# Patient Record
Sex: MALE | Race: WHITE | NOT HISPANIC OR LATINO | Employment: OTHER | URBAN - METROPOLITAN AREA
[De-identification: names, ages, dates, MRNs, and addresses within clinical notes are randomized per-mention and may not be internally consistent; named-entity substitution may affect disease eponyms.]

---

## 2017-01-06 ENCOUNTER — LAB VISIT (OUTPATIENT)
Dept: LAB | Facility: HOSPITAL | Age: 77
End: 2017-01-06
Attending: INTERNAL MEDICINE
Payer: MEDICARE

## 2017-01-06 ENCOUNTER — OFFICE VISIT (OUTPATIENT)
Dept: HEMATOLOGY/ONCOLOGY | Facility: CLINIC | Age: 77
End: 2017-01-06
Payer: MEDICARE

## 2017-01-06 VITALS
DIASTOLIC BLOOD PRESSURE: 67 MMHG | WEIGHT: 180.56 LBS | HEIGHT: 70 IN | OXYGEN SATURATION: 97 % | RESPIRATION RATE: 20 BRPM | SYSTOLIC BLOOD PRESSURE: 146 MMHG | BODY MASS INDEX: 25.85 KG/M2 | HEART RATE: 64 BPM | TEMPERATURE: 98 F

## 2017-01-06 DIAGNOSIS — C90.00 MULTIPLE MYELOMA, REMISSION STATUS UNSPECIFIED: ICD-10-CM

## 2017-01-06 DIAGNOSIS — C90.00 MULTIPLE MYELOMA, REMISSION STATUS UNSPECIFIED: Primary | ICD-10-CM

## 2017-01-06 DIAGNOSIS — Z76.82 STEM CELL TRANSPLANT CANDIDATE: ICD-10-CM

## 2017-01-06 LAB
ALBUMIN SERPL BCP-MCNC: 3.3 G/DL
ALP SERPL-CCNC: 51 U/L
ALT SERPL W/O P-5'-P-CCNC: 18 U/L
ANION GAP SERPL CALC-SCNC: 6 MMOL/L
AST SERPL-CCNC: 14 U/L
BASOPHILS # BLD AUTO: 0.02 K/UL
BASOPHILS NFR BLD: 0.6 %
BILIRUB SERPL-MCNC: 0.5 MG/DL
BUN SERPL-MCNC: 16 MG/DL
CALCIUM SERPL-MCNC: 8.5 MG/DL
CHLORIDE SERPL-SCNC: 106 MMOL/L
CO2 SERPL-SCNC: 26 MMOL/L
CREAT SERPL-MCNC: 1.1 MG/DL
DIFFERENTIAL METHOD: ABNORMAL
EOSINOPHIL # BLD AUTO: 0.1 K/UL
EOSINOPHIL NFR BLD: 3.3 %
ERYTHROCYTE [DISTWIDTH] IN BLOOD BY AUTOMATED COUNT: 13.5 %
EST. GFR  (AFRICAN AMERICAN): >60 ML/MIN/1.73 M^2
EST. GFR  (NON AFRICAN AMERICAN): >60 ML/MIN/1.73 M^2
GLUCOSE SERPL-MCNC: 108 MG/DL
HCT VFR BLD AUTO: 39 %
HGB BLD-MCNC: 12.7 G/DL
IGA SERPL-MCNC: 83 MG/DL
IGG SERPL-MCNC: 457 MG/DL
IGM SERPL-MCNC: 25 MG/DL
LYMPHOCYTES # BLD AUTO: 1 K/UL
LYMPHOCYTES NFR BLD: 28.3 %
MCH RBC QN AUTO: 30.5 PG
MCHC RBC AUTO-ENTMCNC: 32.6 %
MCV RBC AUTO: 94 FL
MONOCYTES # BLD AUTO: 0.7 K/UL
MONOCYTES NFR BLD: 18.1 %
NEUTROPHILS # BLD AUTO: 1.8 K/UL
NEUTROPHILS NFR BLD: 49.7 %
PLATELET # BLD AUTO: 71 K/UL
PMV BLD AUTO: 13 FL
POTASSIUM SERPL-SCNC: 4.5 MMOL/L
PROT SERPL-MCNC: 5.7 G/DL
RBC # BLD AUTO: 4.17 M/UL
SODIUM SERPL-SCNC: 138 MMOL/L
WBC # BLD AUTO: 3.6 K/UL

## 2017-01-06 PROCEDURE — 99214 OFFICE O/P EST MOD 30 MIN: CPT | Mod: S$PBB,,, | Performed by: INTERNAL MEDICINE

## 2017-01-06 PROCEDURE — 99999 PR PBB SHADOW E&M-EST. PATIENT-LVL III: CPT | Mod: PBBFAC,,, | Performed by: INTERNAL MEDICINE

## 2017-01-06 PROCEDURE — 99213 OFFICE O/P EST LOW 20 MIN: CPT | Mod: PBBFAC | Performed by: INTERNAL MEDICINE

## 2017-01-06 PROCEDURE — 86334 IMMUNOFIX E-PHORESIS SERUM: CPT | Mod: 26,,, | Performed by: PATHOLOGY

## 2017-01-06 PROCEDURE — 84165 PROTEIN E-PHORESIS SERUM: CPT | Mod: 26,,, | Performed by: PATHOLOGY

## 2017-01-06 RX ORDER — PREDNISOLONE ACETATE 10 MG/ML
SUSPENSION/ DROPS OPHTHALMIC
COMMUNITY
Start: 2016-12-12 | End: 2017-05-22

## 2017-01-06 RX ORDER — LATANOPROST 50 UG/ML
SOLUTION/ DROPS OPHTHALMIC
COMMUNITY
Start: 2016-12-07

## 2017-01-06 RX ORDER — LENALIDOMIDE 10 MG/1
CAPSULE ORAL
COMMUNITY
Start: 2016-12-22 | End: 2017-07-26 | Stop reason: SDUPTHER

## 2017-01-06 RX ORDER — CYCLOSPORINE 0.5 MG/ML
EMULSION OPHTHALMIC
COMMUNITY
Start: 2016-12-12

## 2017-01-06 NOTE — PROGRESS NOTES
SECTION OF HEMATOLOGY AND BONE MARROW TRANSPLANT  New Patient Visit   01/06/2017  Referred by:  No ref. provider found  Referred for:  MM, autoSCT    CHIEF COMPLAINT:   No chief complaint on file.      HISTORY OF PRESENT ILLNESS:   75 yo male with pmh as below, all well controlled on current medications; recent diagnosis of systemic myeloma, cytogenetic studies normal.  Presenting for cataract surgery.  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  Patient subsequently started XRT (has completed 3 of 23 planned fractions) with rad onc in [place].   He has also started weekly decadron (dose 3 today).  Both have resulted in improvement in proptosis and vision.  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  PET scan showed known orbit lesion as well as multiple other sites of  Bone disease.    Of note remote history of localized prostate ca s/p brachytherapy.  States PSA monitored frequently and he has no evidence of relapse.  Of note at time of prostate ca diagnosis approximately 10 years ago had bone marrow biopsy for low plt that was unremarkable.    PAST MEDICAL HISTORY:   Past Medical History   Diagnosis Date    Allergy     Anxiety     Cataract     Glaucoma     Hypertension     Prostate cancer     Thyroid disease        PAST SURGICAL HISTORY:   Past Surgical History   Procedure Laterality Date    Carpal tunnel release      Shoulder surgery      Brain surgery  2010    Broken leg Right        PAST SOCIAL HISTORY:   reports that he has quit smoking. He does not have any smokeless tobacco history on file. He reports that he drinks about 4.2 oz of alcohol per week  He reports that he does not use illicit drugs.    FAMILY HISTORY:  Family History   Problem Relation Age of Onset    Cancer Mother     Prostate cancer Father     Coronary artery disease Father      Dementia Sister     Stroke Brother     Dementia Brother     No Known Problems Brother     Dementia Brother     No Known Problems Brother     Pancreatitis Brother     Cancer Brother     No Known Problems Sister     No Known Problems Sister        CURRENT MEDICATIONS:   Current Outpatient Prescriptions   Medication Sig    alfuzosin (UROXATRAL) 10 mg Tb24     alprazolam (XANAX) 0.5 MG tablet     carvedilol (COREG) 6.25 MG tablet     ciprofloxacin HCl (CILOXAN) 0.3 % ophthalmic solution     dexamethasone (DECADRON) 4 MG Tab     DUREZOL 0.05 % Drop ophthalmic solution     fluorometholone 0.1% (FML) 0.1 % DrpS     hydrocortisone 1 % cream     ILEVRO 0.3 % DrpS     ketoconazole (NIZORAL) 2 % shampoo     latanoprost 0.005 % ophthalmic solution     levothyroxine (SYNTHROID) 125 MCG tablet     naproxen (NAPROSYN) 500 MG tablet     neomycin-polymyxin-hydrocortisone (CORTISPORIN) otic solution     omeprazole (PRILOSEC) 40 MG capsule     prednisoLONE acetate (PRED FORTE) 1 % DrpS     RESTASIS 0.05 % ophthalmic emulsion     REVLIMID 25 mg Cap     rosuvastatin (CRESTOR) 20 MG tablet Take 20 mg by mouth.    timolol maleate 0.5% (TIMOPTIC) 0.5 % Drop      No current facility-administered medications for this visit.      ALLERGIES:   Review of patient's allergies indicates:   Allergen Reactions    Decongest tabs      All over the counter medications containing decongestive.              REVIEW OF SYSTEMS:   General ROS: negative  Psychological ROS: negative  Ophthalmic ROS: see HPI  ENT ROS: negative  Allergy and Immunology ROS: negative  Hematological and Lymphatic ROS: negative  Endocrine ROS: negative  Respiratory ROS: negative  Cardiovascular ROS: negative  Gastrointestinal ROS: negative  Genito-Urinary ROS: negative  Musculoskeletal ROS: negative  Neurological ROS: negative  Dermatological ROS: negative    PHYSICAL EXAM:   Vitals:    01/06/17 1435   BP: (!) 146/67   Pulse: 64   Resp: 20   Temp: 97.7  °F (36.5 °C)       General - well developed, well nourished, no apparent distress  Head & Face - no sinus tenderness  Eyes - normal conjunctivae and lids   ENT - normal external auditory canals and tympanic membranes bilaterally oropharynx clear,  Normal dentition and gums  Neck - normal thyroid  Chest and Lung - normal respiratory effort, clear to auscultation bilaterally   Cardiovascular - RRR with no MGR, normal S1 and S2; no pedal edema  Abdomen -  soft, nontender, no palpable hepatomegaly or splenomegaly  Lymph - no palpable lymphadenopathy  Extremities - unremarkable nails and digits  Heme - no bruising, petechiae, pallor  Skin - no rashes or lesions  Psych - appropriate mood and affect      ECOG Performance Status: (foot note - ECOG PS provided by Eastern Cooperative Oncology Group) 1 - Symptomatic but completely ambulatory    Karnofsky Performance Score:  90%- Able to Carry on Normal Activity: Minor Symptoms of Disease  DATA: .  Lab Results   Component Value Date    WBC 3.60 (L) 01/06/2017    HGB 12.7 (L) 01/06/2017    HCT 39.0 (L) 01/06/2017    MCV 94 01/06/2017    PLT 71 (L) 01/06/2017     Gran #   Date Value Ref Range Status   10/13/2016 3.6 1.8 - 7.7 K/uL Final     Gran%   Date Value Ref Range Status   10/13/2016 86.3 (H) 38.0 - 73.0 % Final     Lymph #   Date Value Ref Range Status   10/13/2016 0.5 (L) 1.0 - 4.8 K/uL Final     Lymph%   Date Value Ref Range Status   10/13/2016 11.5 (L) 18.0 - 48.0 % Final     CMP  Sodium   Date Value Ref Range Status   10/13/2016 137 136 - 145 mmol/L Final     Potassium   Date Value Ref Range Status   10/13/2016 4.4 3.5 - 5.1 mmol/L Final     Chloride   Date Value Ref Range Status   10/13/2016 103 95 - 110 mmol/L Final     CO2   Date Value Ref Range Status   10/13/2016 25 23 - 29 mmol/L Final     Glucose   Date Value Ref Range Status   10/13/2016 160 (H) 70 - 110 mg/dL Final     BUN, Bld   Date Value Ref Range Status   10/13/2016 19 8 - 23 mg/dL Final     Creatinine    Date Value Ref Range Status   10/13/2016 1.0 0.5 - 1.4 mg/dL Final     Calcium   Date Value Ref Range Status   10/13/2016 9.7 8.7 - 10.5 mg/dL Final     Total Protein   Date Value Ref Range Status   10/13/2016 6.8 6.0 - 8.4 g/dL Final     Albumin   Date Value Ref Range Status   10/13/2016 4.0 3.5 - 5.2 g/dL Final     Total Bilirubin   Date Value Ref Range Status   10/13/2016 0.6 0.1 - 1.0 mg/dL Final     Comment:     For infants and newborns, interpretation of results should be based  on gestational age, weight and in agreement with clinical  observations.  Premature Infant recommended reference ranges:  Up to 24 hours.............<8.0 mg/dL  Up to 48 hours............<12.0 mg/dL  3-5 days..................<15.0 mg/dL  6-29 days.................<15.0 mg/dL       Alkaline Phosphatase   Date Value Ref Range Status   10/13/2016 71 55 - 135 U/L Final     AST   Date Value Ref Range Status   10/13/2016 15 10 - 40 U/L Final     ALT   Date Value Ref Range Status   10/13/2016 16 10 - 44 U/L Final     Anion Gap   Date Value Ref Range Status   10/13/2016 9 8 - 16 mmol/L Final     eGFR if    Date Value Ref Range Status   10/13/2016 >60.0 >60 mL/min/1.73 m^2 Final     eGFR if non    Date Value Ref Range Status   10/13/2016 >60.0 >60 mL/min/1.73 m^2 Final     Comment:     Calculation used to obtain the estimated glomerular filtration  rate (eGFR) is the CKD-EPI equation. Since race is unknown   in our information system, the eGFR values for   -American and Non--American patients are given   for each creatinine result.       IgG - Serum   Date Value Ref Range Status   10/13/2016 521 (L) 650 - 1600 mg/dL Final     Comment:     IgG Cord Blood Reference Range: 650-1600 mg/dL.     IgA   Date Value Ref Range Status   10/13/2016 442 (H) 40 - 350 mg/dL Final     Comment:     IgA Cord Blood Reference Range: <5 mg/dL.     IgM   Date Value Ref Range Status   10/13/2016 29 (L) 50 - 300 mg/dL  Final     Comment:     IgM Cord Blood Reference Range: <25 mg/dL.     Kappa Free Light Chains   Date Value Ref Range Status   10/13/2016 1.02 0.33 - 1.94 mg/dL Final     Lambda Free Light Chains   Date Value Ref Range Status   10/13/2016 38.98 (H) 0.57 - 2.63 mg/dL Final     Kappa/Lambda FLC Ratio   Date Value Ref Range Status   10/13/2016 0.03 (L) 0.26 - 1.65 Final        PLEASE SEE EPIC MEDIA TAB FOR ALL IMAGING, PATHOLOGY REPORTS FROM OSH  ASSESSMENT AND PLAN:   No diagnosis found.  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  -77 yo but lack of comorbidities and excellent PS make him a reasonable autoSCT with reduced dose melphalan  -discussed need for adequate response, adequate pre transplant evaluation, and adequate stem cell collection prior to proceeding with transplant  -briefly discussed transplant process with patient and his family  -discussed current evidence that autoSCT, even in modern therapeutic era, offers PFS and OS advantage; also discussed that if he were to achieve CR or sCR with triplet therapy that other potential option would be complete set # of cycles of therapy and then  transition to maintenance  -patient would like to take into consideration and return to BMT clinic in 2 months  -recommend proceed with VRd therapy; supportive acyclovir, asa, bisphosphanate;reviewed side effects with patient  -no contraindication to starting therapy during radiation treatments  -also recommend he have staging 24 hr urine protein/UIEF/UPEP prior to initiating therapy for pre treatment baseline  -to have colonscopy to have cecal abnormality   Follow Up: No Follow-up on file. with cbc, cmp, serum free light chains, quantitative immunoglobulins, serum electropheresis, serum immunofixation     Bernardo Park MD  Hematology/Oncology/Bone Marrow  Transplant

## 2017-01-06 NOTE — Clinical Note
8 weeks with cbc, cmp, serum free light chains, quantitative immunoglobulins, serum electropheresis, serum immunofixation, MRI orbit/brain, PET scan, bone marrow biopsy and MD appt

## 2017-01-06 NOTE — PROGRESS NOTES
"SECTION OF HEMATOLOGY AND BONE MARROW TRANSPLANT  Return Patient Visit   01/09/2017  Referred by:  No ref. provider found  Referred for:  MM, autoSCT    CHIEF COMPLAINT:   No chief complaint on file.      HISTORY OF PRESENT ILLNESS:   77 yo male with pmh as below, all well controlled on current medications; diagnosis of systemic myeloma, cytogenetic studies normal.  Initially presentedfor cataract surgery.  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  Patient subsequently completed 23 planned fractions of XRT + dex with rad onc in [place].     Both have resulted in improvement in proptosis and vision.  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  PET scan showed known orbit lesion as well as multiple other sites of  Bone disease.    He has subsequently completed 2 cycles of VRd and is starting his 3rd.  He has been tolerating withtout complications.    Of note remote history of localized prostate ca s/p brachytherapy.  States PSA monitored frequently and he has no evidence of relapse.  Of note at time of prostate ca diagnosis approximately 10 years ago had bone marrow biopsy for low plt that was unremarkable.   He presents today with his daughter and wife.  He still has some decreased vision in left eye due to "scar tissue" from radiation.  Planned for eye surgery in coming weeks to remove scar tissue.  Otherwise he is 76 but remains remarkably functional.        PAST MEDICAL HISTORY:   Past Medical History   Diagnosis Date    Allergy     Anxiety     Cataract     Glaucoma     Hypertension     Prostate cancer     Thyroid disease        PAST SURGICAL HISTORY:   Past Surgical History   Procedure Laterality Date    Carpal tunnel release      Shoulder surgery      Brain surgery  2010    Broken leg Right        PAST SOCIAL HISTORY:   reports that he has quit smoking. " He does not have any smokeless tobacco history on file. He reports that he drinks about 4.2 oz of alcohol per week  He reports that he does not use illicit drugs.    FAMILY HISTORY:  Family History   Problem Relation Age of Onset    Cancer Mother     Prostate cancer Father     Coronary artery disease Father     Dementia Sister     Stroke Brother     Dementia Brother     No Known Problems Brother     Dementia Brother     No Known Problems Brother     Pancreatitis Brother     Cancer Brother     No Known Problems Sister     No Known Problems Sister        CURRENT MEDICATIONS:   Current Outpatient Prescriptions   Medication Sig    alfuzosin (UROXATRAL) 10 mg Tb24     alprazolam (XANAX) 0.5 MG tablet     carvedilol (COREG) 6.25 MG tablet     ciprofloxacin HCl (CILOXAN) 0.3 % ophthalmic solution     dexamethasone (DECADRON) 4 MG Tab     DUREZOL 0.05 % Drop ophthalmic solution     fluorometholone 0.1% (FML) 0.1 % DrpS     hydrocortisone 1 % cream     ILEVRO 0.3 % DrpS     ketoconazole (NIZORAL) 2 % shampoo     latanoprost 0.005 % ophthalmic solution     levothyroxine (SYNTHROID) 125 MCG tablet     naproxen (NAPROSYN) 500 MG tablet     neomycin-polymyxin-hydrocortisone (CORTISPORIN) otic solution     omeprazole (PRILOSEC) 40 MG capsule     prednisoLONE acetate (PRED FORTE) 1 % DrpS     RESTASIS 0.05 % ophthalmic emulsion     REVLIMID 25 mg Cap     rosuvastatin (CRESTOR) 20 MG tablet Take 20 mg by mouth.    timolol maleate 0.5% (TIMOPTIC) 0.5 % Drop      No current facility-administered medications for this visit.      ALLERGIES:   Review of patient's allergies indicates:   Allergen Reactions    Decongest tabs      All over the counter medications containing decongestive.              REVIEW OF SYSTEMS:   General ROS: negative  Psychological ROS: negative  Ophthalmic ROS: see HPI  ENT ROS: negative  Allergy and Immunology ROS: negative  Hematological and Lymphatic ROS: negative  Endocrine  ROS: negative  Respiratory ROS: negative  Cardiovascular ROS: negative  Gastrointestinal ROS: negative  Genito-Urinary ROS: negative  Musculoskeletal ROS: negative  Neurological ROS: negative  Dermatological ROS: negative    PHYSICAL EXAM:   Vitals:    01/06/17 1435   BP: (!) 146/67   Pulse: 64   Resp: 20   Temp: 97.7 °F (36.5 °C)       General - well developed, well nourished, no apparent distress  Head & Face - no sinus tenderness  Eyes - normal conjunctivae and lids   ENT - normal external auditory canals and tympanic membranes bilaterally oropharynx clear,  Normal dentition and gums  Neck - normal thyroid  Chest and Lung - normal respiratory effort, clear to auscultation bilaterally   Cardiovascular - RRR with no MGR, normal S1 and S2; no pedal edema  Abdomen -  soft, nontender, no palpable hepatomegaly or splenomegaly  Lymph - no palpable lymphadenopathy  Extremities - unremarkable nails and digits  Heme - no bruising, petechiae, pallor  Skin - no rashes or lesions  Psych - appropriate mood and affect      ECOG Performance Status: (foot note - ECOG PS provided by Eastern Cooperative Oncology Group) 1 - Symptomatic but completely ambulatory    Karnofsky Performance Score:  90%- Able to Carry on Normal Activity: Minor Symptoms of Disease  DATA: .  Lab Results   Component Value Date    WBC 3.60 (L) 01/06/2017    HGB 12.7 (L) 01/06/2017    HCT 39.0 (L) 01/06/2017    MCV 94 01/06/2017    PLT 71 (L) 01/06/2017     Gran #   Date Value Ref Range Status   01/06/2017 1.8 1.8 - 7.7 K/uL Final     Gran%   Date Value Ref Range Status   01/06/2017 49.7 38.0 - 73.0 % Final     Lymph #   Date Value Ref Range Status   01/06/2017 1.0 1.0 - 4.8 K/uL Final     Lymph%   Date Value Ref Range Status   01/06/2017 28.3 18.0 - 48.0 % Final     CMP  Sodium   Date Value Ref Range Status   01/06/2017 138 136 - 145 mmol/L Final     Potassium   Date Value Ref Range Status   01/06/2017 4.5 3.5 - 5.1 mmol/L Final     Chloride   Date Value Ref  Range Status   01/06/2017 106 95 - 110 mmol/L Final     CO2   Date Value Ref Range Status   01/06/2017 26 23 - 29 mmol/L Final     Glucose   Date Value Ref Range Status   01/06/2017 108 70 - 110 mg/dL Final     BUN, Bld   Date Value Ref Range Status   01/06/2017 16 8 - 23 mg/dL Final     Creatinine   Date Value Ref Range Status   01/06/2017 1.1 0.5 - 1.4 mg/dL Final     Calcium   Date Value Ref Range Status   01/06/2017 8.5 (L) 8.7 - 10.5 mg/dL Final     Total Protein   Date Value Ref Range Status   01/06/2017 5.7 (L) 6.0 - 8.4 g/dL Final     Albumin   Date Value Ref Range Status   01/06/2017 3.3 (L) 3.5 - 5.2 g/dL Final     Total Bilirubin   Date Value Ref Range Status   01/06/2017 0.5 0.1 - 1.0 mg/dL Final     Comment:     For infants and newborns, interpretation of results should be based  on gestational age, weight and in agreement with clinical  observations.  Premature Infant recommended reference ranges:  Up to 24 hours.............<8.0 mg/dL  Up to 48 hours............<12.0 mg/dL  3-5 days..................<15.0 mg/dL  6-29 days.................<15.0 mg/dL       Alkaline Phosphatase   Date Value Ref Range Status   01/06/2017 51 (L) 55 - 135 U/L Final     AST   Date Value Ref Range Status   01/06/2017 14 10 - 40 U/L Final     ALT   Date Value Ref Range Status   01/06/2017 18 10 - 44 U/L Final     Anion Gap   Date Value Ref Range Status   01/06/2017 6 (L) 8 - 16 mmol/L Final     eGFR if    Date Value Ref Range Status   01/06/2017 >60.0 >60 mL/min/1.73 m^2 Final     eGFR if non    Date Value Ref Range Status   01/06/2017 >60.0 >60 mL/min/1.73 m^2 Final     Comment:     Calculation used to obtain the estimated glomerular filtration  rate (eGFR) is the CKD-EPI equation. Since race is unknown   in our information system, the eGFR values for   -American and Non--American patients are given   for each creatinine result.       IgG - Serum   Date Value Ref Range Status    01/06/2017 457 (L) 650 - 1600 mg/dL Final     Comment:     IgG Cord Blood Reference Range: 650-1600 mg/dL.     IgA   Date Value Ref Range Status   01/06/2017 83 40 - 350 mg/dL Final     Comment:     IgA Cord Blood Reference Range: <5 mg/dL.     IgM   Date Value Ref Range Status   01/06/2017 25 (L) 50 - 300 mg/dL Final     Comment:     IgM Cord Blood Reference Range: <25 mg/dL.     Kappa Free Light Chains   Date Value Ref Range Status   10/13/2016 1.02 0.33 - 1.94 mg/dL Final     Lambda Free Light Chains   Date Value Ref Range Status   10/13/2016 38.98 (H) 0.57 - 2.63 mg/dL Final     Kappa/Lambda FLC Ratio   Date Value Ref Range Status   10/13/2016 0.03 (L) 0.26 - 1.65 Final        PLEASE SEE EPIC MEDIA TAB FOR ALL IMAGING, PATHOLOGY REPORTS FROM OSH  ASSESSMENT AND PLAN:   Encounter Diagnoses   Name Primary?   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 now symptomatically improved; will need fu MRI  -77 yo but lack of comorbidities and excellent PS make him a reasonable autoSCT with reduced dose melphalan  -discussed need for adequate response, adequate pre transplant evaluation, and adequate stem cell collection prior to proceeding with transplant; plan for pre transplant eval in 8 weeks   -discussed current evidence that autoSCT, even in modern therapeutic era, offers PFS and OS advantage; also discussed that if he were to achieve CR or sCR with triplet therapy that other potential option would be complete set # of cycles of therapy and then  transition to maintenance  -patient would like to take into consideration and return to BMT clinic in 2 months for restaging  -recommend proceed with VRd therapy; supportive acyclovir, asa,  bisphosphanate;reviewed side effects with patient  -to have colonscopy to have cecal abnormality; will inquire if he had this at next appt     Follow Up: 8 weeks with cbc, cmp, serum free light chains, quantitative immunoglobulins, serum electropheresis, serum immunofixation, MRI orbit/brain, PET scan, bone marrow biopsy and MD appt     Bernardo Park MD  Hematology/Oncology/Bone Marrow Transplant

## 2017-01-09 ENCOUNTER — TELEPHONE (OUTPATIENT)
Dept: HEMATOLOGY/ONCOLOGY | Facility: CLINIC | Age: 77
End: 2017-01-09

## 2017-01-09 DIAGNOSIS — C90.00 MULTIPLE MYELOMA, REMISSION STATUS UNSPECIFIED: Primary | ICD-10-CM

## 2017-01-09 LAB
ALBUMIN SERPL ELPH-MCNC: 3.51 G/DL
ALPHA1 GLOB SERPL ELPH-MCNC: 0.26 G/DL
ALPHA2 GLOB SERPL ELPH-MCNC: 0.62 G/DL
B-GLOBULIN SERPL ELPH-MCNC: 0.55 G/DL
GAMMA GLOB SERPL ELPH-MCNC: 0.46 G/DL
INTERPRETATION SERPL IFE-IMP: NORMAL
KAPPA LC SER QL IA: 1.96 MG/DL
KAPPA LC/LAMBDA SER IA: 0.46
LAMBDA LC SER QL IA: 4.29 MG/DL
PATHOLOGIST INTERPRETATION IFE: NORMAL
PATHOLOGIST INTERPRETATION SPE: NORMAL
PROT SERPL-MCNC: 5.4 G/DL

## 2017-01-09 NOTE — PROGRESS NOTES
Spoke with pt's daughter to inform her of upcoming bone marrow bx in OR under sedation on 3/2/17 - appt for 1130 am (arrival time 1000). Daughter verbalized understanding of plan.    Nicki Ambrocio, PharmD, [facility]  BMT Clinical   Department of Hematology and Stem Cell Transplant   Office: [phone]

## 2017-01-09 NOTE — TELEPHONE ENCOUNTER
Faxed most recent set of labs from Friday 1/6/17.     ----- Message from Yuly Haider sent at 1/9/2017 10:34 AM CST -----  Contact: Madalyn with [facility]  Madalyn with [facility] called and gave us updates info for their office.    Fax number is [phone]  Contact number is [phone]

## 2017-01-13 DIAGNOSIS — C90.00 MULTIPLE MYELOMA, REMISSION STATUS UNSPECIFIED: Primary | ICD-10-CM

## 2017-03-01 ENCOUNTER — TELEPHONE (OUTPATIENT)
Dept: HEMATOLOGY/ONCOLOGY | Facility: CLINIC | Age: 77
End: 2017-03-01

## 2017-03-01 NOTE — TELEPHONE ENCOUNTER
----- Message from Shania Villegas sent at 3/1/2017 10:19 AM CST -----  Contact: daughter/Barbara [phone] or cell [phone]  Pt's daughter called and would like to know what time is the pt's surgery that is scheduled for 03/02/17    Ms. Barbara can be reached at [phone]      Thank you

## 2017-03-01 NOTE — TELEPHONE ENCOUNTER
Let patient's daughter know that he is to arrive at 830 am, be NPO after midnight except for small sips of water for BP or diabetes medication.

## 2017-03-02 ENCOUNTER — ANESTHESIA EVENT (OUTPATIENT)
Dept: SURGERY | Facility: HOSPITAL | Age: 77
End: 2017-03-02
Payer: MEDICARE

## 2017-03-02 ENCOUNTER — HOSPITAL ENCOUNTER (OUTPATIENT)
Facility: HOSPITAL | Age: 77
Discharge: HOME | End: 2017-03-02
Attending: INTERNAL MEDICINE | Admitting: INTERNAL MEDICINE
Payer: MEDICARE

## 2017-03-02 ENCOUNTER — HOSPITAL ENCOUNTER (OUTPATIENT)
Dept: RADIOLOGY | Facility: HOSPITAL | Age: 77
Discharge: HOME | End: 2017-03-02
Attending: INTERNAL MEDICINE
Payer: MEDICARE

## 2017-03-02 ENCOUNTER — ANESTHESIA (OUTPATIENT)
Dept: SURGERY | Facility: HOSPITAL | Age: 77
End: 2017-03-02
Payer: MEDICARE

## 2017-03-02 VITALS
TEMPERATURE: 98 F | HEART RATE: 56 BPM | DIASTOLIC BLOOD PRESSURE: 67 MMHG | BODY MASS INDEX: 25.05 KG/M2 | HEIGHT: 70 IN | RESPIRATION RATE: 18 BRPM | WEIGHT: 175 LBS | OXYGEN SATURATION: 100 % | SYSTOLIC BLOOD PRESSURE: 149 MMHG

## 2017-03-02 DIAGNOSIS — Z76.82 STEM CELL TRANSPLANT CANDIDATE: ICD-10-CM

## 2017-03-02 DIAGNOSIS — C90.00 MULTIPLE MYELOMA, REMISSION STATUS UNSPECIFIED: ICD-10-CM

## 2017-03-02 DIAGNOSIS — C90.00 MULTIPLE MYELOMA: ICD-10-CM

## 2017-03-02 DIAGNOSIS — C90.00 MULTIPLE MYELOMA NOT HAVING ACHIEVED REMISSION: Primary | ICD-10-CM

## 2017-03-02 PROCEDURE — 88342 IMHCHEM/IMCYTCHM 1ST ANTB: CPT | Mod: 26,,, | Performed by: PATHOLOGY

## 2017-03-02 PROCEDURE — 88185 FLOWCYTOMETRY/TC ADD-ON: CPT | Mod: 59 | Performed by: PATHOLOGY

## 2017-03-02 PROCEDURE — 36000705 HC OR TIME LEV I EA ADD 15 MIN: Performed by: INTERNAL MEDICINE

## 2017-03-02 PROCEDURE — 88237 TISSUE CULTURE BONE MARROW: CPT

## 2017-03-02 PROCEDURE — A9585 GADOBUTROL INJECTION: HCPCS | Performed by: INTERNAL MEDICINE

## 2017-03-02 PROCEDURE — 37000009 HC ANESTHESIA EA ADD 15 MINS: Performed by: INTERNAL MEDICINE

## 2017-03-02 PROCEDURE — 70553 MRI BRAIN STEM W/O & W/DYE: CPT | Mod: 26,GC,, | Performed by: RADIOLOGY

## 2017-03-02 PROCEDURE — 88189 FLOWCYTOMETRY/READ 16 & >: CPT | Mod: ,,, | Performed by: PATHOLOGY

## 2017-03-02 PROCEDURE — 88305 TISSUE EXAM BY PATHOLOGIST: CPT | Mod: 26,,, | Performed by: PATHOLOGY

## 2017-03-02 PROCEDURE — 88305 TISSUE EXAM BY PATHOLOGIST: CPT | Performed by: PATHOLOGY

## 2017-03-02 PROCEDURE — D9220A PRA ANESTHESIA: Mod: ANES,,, | Performed by: ANESTHESIOLOGY

## 2017-03-02 PROCEDURE — 25000003 PHARM REV CODE 250: Performed by: INTERNAL MEDICINE

## 2017-03-02 PROCEDURE — 88311 DECALCIFY TISSUE: CPT | Mod: 26,,, | Performed by: PATHOLOGY

## 2017-03-02 PROCEDURE — 88184 FLOWCYTOMETRY/ TC 1 MARKER: CPT | Performed by: PATHOLOGY

## 2017-03-02 PROCEDURE — 71000044 HC DOSC ROUTINE RECOVERY FIRST HOUR: Performed by: INTERNAL MEDICINE

## 2017-03-02 PROCEDURE — 25500020 PHARM REV CODE 255: Performed by: INTERNAL MEDICINE

## 2017-03-02 PROCEDURE — 25000003 PHARM REV CODE 250: Performed by: NURSE ANESTHETIST, CERTIFIED REGISTERED

## 2017-03-02 PROCEDURE — 37000008 HC ANESTHESIA 1ST 15 MINUTES: Performed by: INTERNAL MEDICINE

## 2017-03-02 PROCEDURE — D9220A PRA ANESTHESIA: Mod: CRNA,,, | Performed by: NURSE ANESTHETIST, CERTIFIED REGISTERED

## 2017-03-02 PROCEDURE — 88313 SPECIAL STAINS GROUP 2: CPT

## 2017-03-02 PROCEDURE — 71000015 HC POSTOP RECOV 1ST HR: Performed by: INTERNAL MEDICINE

## 2017-03-02 PROCEDURE — 38221 DX BONE MARROW BIOPSIES: CPT | Mod: S$PBB,LT,, | Performed by: NURSE PRACTITIONER

## 2017-03-02 PROCEDURE — 63600175 PHARM REV CODE 636 W HCPCS: Performed by: NURSE ANESTHETIST, CERTIFIED REGISTERED

## 2017-03-02 PROCEDURE — 70553 MRI BRAIN STEM W/O & W/DYE: CPT | Mod: TC

## 2017-03-02 PROCEDURE — 88271 CYTOGENETICS DNA PROBE: CPT | Mod: 91

## 2017-03-02 PROCEDURE — 88274 CYTOGENETICS 25-99: CPT | Mod: 91

## 2017-03-02 PROCEDURE — 88313 SPECIAL STAINS GROUP 2: CPT | Mod: 26,,, | Performed by: PATHOLOGY

## 2017-03-02 PROCEDURE — 88264 CHROMOSOME ANALYSIS 20-25: CPT

## 2017-03-02 PROCEDURE — 88274 CYTOGENETICS 25-99: CPT

## 2017-03-02 PROCEDURE — 85097 BONE MARROW INTERPRETATION: CPT | Mod: ,,, | Performed by: PATHOLOGY

## 2017-03-02 PROCEDURE — 36000704 HC OR TIME LEV I 1ST 15 MIN: Performed by: INTERNAL MEDICINE

## 2017-03-02 RX ORDER — LIDOCAINE HCL/PF 100 MG/5ML
SYRINGE (ML) INTRAVENOUS
Status: DISCONTINUED | OUTPATIENT
Start: 2017-03-02 | End: 2017-03-02

## 2017-03-02 RX ORDER — LIDOCAINE HYDROCHLORIDE 10 MG/ML
1 INJECTION, SOLUTION EPIDURAL; INFILTRATION; INTRACAUDAL; PERINEURAL ONCE
Status: COMPLETED | OUTPATIENT
Start: 2017-03-02 | End: 2017-03-02

## 2017-03-02 RX ORDER — LIDOCAINE HYDROCHLORIDE 10 MG/ML
INJECTION, SOLUTION EPIDURAL; INFILTRATION; INTRACAUDAL; PERINEURAL
Status: DISCONTINUED | OUTPATIENT
Start: 2017-03-02 | End: 2017-03-02 | Stop reason: HOSPADM

## 2017-03-02 RX ORDER — GADOBUTROL 604.72 MG/ML
8 INJECTION INTRAVENOUS
Status: COMPLETED | OUTPATIENT
Start: 2017-03-02 | End: 2017-03-02

## 2017-03-02 RX ORDER — PROPOFOL 10 MG/ML
VIAL (ML) INTRAVENOUS
Status: DISCONTINUED | OUTPATIENT
Start: 2017-03-02 | End: 2017-03-02

## 2017-03-02 RX ORDER — SODIUM CHLORIDE 9 MG/ML
INJECTION, SOLUTION INTRAVENOUS CONTINUOUS
Status: DISCONTINUED | OUTPATIENT
Start: 2017-03-02 | End: 2017-03-02 | Stop reason: HOSPADM

## 2017-03-02 RX ORDER — ASPIRIN 81 MG/1
81 TABLET ORAL DAILY
COMMUNITY

## 2017-03-02 RX ADMIN — LIDOCAINE HYDROCHLORIDE 0.2 MG: 10 INJECTION, SOLUTION EPIDURAL; INFILTRATION; INTRACAUDAL; PERINEURAL at 08:03

## 2017-03-02 RX ADMIN — LIDOCAINE HYDROCHLORIDE 50 MG: 20 INJECTION, SOLUTION INTRAVENOUS at 10:03

## 2017-03-02 RX ADMIN — GADOBUTROL 8 ML: 604.72 INJECTION INTRAVENOUS at 12:03

## 2017-03-02 RX ADMIN — PROPOFOL 30 MG: 10 INJECTION, EMULSION INTRAVENOUS at 10:03

## 2017-03-02 RX ADMIN — PROPOFOL 50 MG: 10 INJECTION, EMULSION INTRAVENOUS at 10:03

## 2017-03-02 RX ADMIN — SODIUM CHLORIDE: 0.9 INJECTION, SOLUTION INTRAVENOUS at 08:03

## 2017-03-02 NOTE — DISCHARGE INSTRUCTIONS
Discharge instructions for having a Bone Marrow Aspiration / Biopsy    Keep Bandage in place for 24 hours.  - Do not shower or take a tub bath during this time. (you may sponge bathe).  - Call the nurse or physician for excessive bleeding or pain at biopsy site.  - You may take Tylenol as needed for pain.    You have received medication to sedate you.  - Do not drive a car or operate heavy machinery for the rest of the day.  - You may resume other activities as tolerated.    You can call [phone] for any problems during the hours of 8:00 AM-5:00PM.    For an emergency after 5:00 PM you can call [phone] and have the  page the Hematologist / Oncologist on call.

## 2017-03-02 NOTE — ANESTHESIA PREPROCEDURE EVALUATION
03/02/2017  Tahir Wynne is a 76 y.o., male.    [facility] Anesthesia Evaluation    I have reviewed the Patient Summary Reports.    I have reviewed the Nursing Notes.   I have reviewed the Medications.     Review of Systems  Anesthesia Hx:  No problems with previous Anesthesia  History of prior surgery of interest to airway management or planning: Previous anesthesia: General  Denies Personal Hx of Anesthesia complications.   Cardiovascular:   Hypertension    Pulmonary:  Pulmonary Normal    Renal/:   Prostate ca   Hepatic/GI:  Hepatic/GI Normal    Neurological:  Neurology Normal    Endocrine:   Hypothyroidism    Psych:   Psychiatric History anxiety        Patient Active Problem List   Diagnosis    Multiple myeloma not having achieved remission    Multiple myeloma     Past Medical History:   Diagnosis Date    Allergy     Anxiety     Cataract     Glaucoma     Hypertension     denies htn states takes coreg for rhythm    Prostate cancer     Thyroid disease      Past Surgical History:   Procedure Laterality Date    BRAIN SURGERY  2010    optic nerve tumor    broken leg Right     CARPAL TUNNEL RELEASE      EYE SURGERY      SHOULDER SURGERY           Physical Exam  General:  Well nourished    Airway/Jaw/Neck:  Airway Findings: Mouth Opening: Normal Tongue: Normal  General Airway Assessment: Adult  Mallampati: II  TM Distance: Normal, at least 6 cm  Jaw/Neck Findings:  Neck ROM: Normal ROM      Dental:  Dental Findings: Upper Dentures, lower partial dentures   Chest/Lungs:  Chest/Lungs Findings: Clear to auscultation     Heart/Vascular:  Heart Findings: Rate: Normal  Rhythm: Regular Rhythm  Sounds: Normal        Mental Status:  Mental Status Findings:  Cooperative, Alert and Oriented         Anesthesia Plan  Type of Anesthesia, risks & benefits discussed:  Anesthesia Type:  general  Patient's  Preference: general  Intra-op Monitoring Plan:   Intra-op Monitoring Plan Comments:   Post Op Pain Control Plan:   Post Op Pain Control Plan Comments:   Induction:   IV  Beta Blocker:  Patient is on a Beta-Blocker and has received one dose within the past 24 hours (No further documentation required).       Informed Consent: Patient understands risks and agrees with Anesthesia plan.  Questions answered. Anesthesia consent signed with patient.  ASA Score: 3     Day of Surgery Review of History & Physical:    H&P update referred to the surgeon.         Ready For Surgery From Anesthesia Perspective.

## 2017-03-02 NOTE — H&P
"[facility]  Hematology/Oncology  H&P    Patient Name: Tahir Wynne  MRN: [id number]  Admission Date: 3/2/2017  Code Status: No Order   Attending Provider: Warren Park MD  Primary Care Physician: Florian Madrid MD  Principal Problem:<principal problem not specified>    Subjective:     HPI: 77 yo male with pmh as below, all well controlled on current medications; diagnosis of systemic myeloma, cytogenetic studies normal. Initially presentedfor cataract surgery. 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 Patient subsequently completed 23 planned fractions of XRT + dex with rad onc in [place]. Both have resulted in improvement in proptosis and vision. 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 PET scan showed known orbit lesion as well as multiple other sites of Bone disease. He has subsequently completed 4 cycles of VRd. He has been tolerating withtout complications. He still has some decreased vision in left eye due to "scar tissue" from radiation with some redness and swelling to the orbit. He presents today for a restaging bone marrow aspiration and biopsy. No complaints today.    Oncology Treatment Plan:   [No treatment plan]    Medications:  Continuous Infusions:   sodium chloride 0.9% 10 mL/hr at 03/02/17 0855     Scheduled Meds:   PRN Meds:     Review of patient's allergies indicates:   Allergen Reactions    Levofloxacin Hives    Decongest tabs      All over the counter medications containing decongestive.         Past Medical History:   Diagnosis Date    Allergy     Anxiety     Cataract     Glaucoma     Hypertension     denies htn states takes coreg for rhythm    Prostate cancer     Thyroid disease      Past Surgical History:   Procedure Laterality Date    BRAIN SURGERY  2010    optic nerve tumor    broken leg Right  "    CARPAL TUNNEL RELEASE      EYE SURGERY      SHOULDER SURGERY       Family History     Problem Relation (Age of Onset)    Cancer Mother, Brother    Coronary artery disease Father    Dementia Sister, Brother, Brother    No Known Problems Brother, Brother, Sister, Sister    Pancreatitis Brother    Prostate cancer Father    Stroke Brother        Social History Main Topics    Smoking status: Former Smoker    Smokeless tobacco: Not on file    Alcohol use 4.2 oz/week     7 Cans of beer per week      Comment: 1 beer nightly    Drug use: No    Sexual activity: Not Currently       Review of Systems   Constitutional: Negative for activity change, appetite change, chills, diaphoresis, fatigue, fever and unexpected weight change.   HENT: Positive for facial swelling. Negative for congestion, dental problem, mouth sores, nosebleeds, postnasal drip, rhinorrhea, sinus pressure, sore throat and trouble swallowing.         Right eye   Eyes: Negative for photophobia and visual disturbance.   Respiratory: Negative for cough and shortness of breath.    Cardiovascular: Negative for chest pain, palpitations and leg swelling.   Gastrointestinal: Negative for abdominal distention, abdominal pain, blood in stool, constipation, diarrhea, nausea and vomiting.   Genitourinary: Negative for dysuria, frequency, hematuria and urgency.   Musculoskeletal: Negative for arthralgias, back pain and myalgias.   Skin: Negative for pallor and rash.   Allergic/Immunologic: Negative for immunocompromised state.   Neurological: Negative for dizziness, syncope, weakness, numbness and headaches.   Hematological: Negative for adenopathy. Does not bruise/bleed easily.   Psychiatric/Behavioral: Negative for confusion. The patient is not nervous/anxious.      Objective:     Vital Signs (Most Recent):  Temp: 97.7 °F (36.5 °C) (03/02/17 0852)  Pulse: 60 (03/02/17 0852)  Resp: 18 (03/02/17 0852)  BP: (!) 153/71 (03/02/17 0852)  SpO2: 100 % (03/02/17 0852)  Vital Signs (24h Range):  Temp:  [97.7 °F (36.5 °C)] 97.7 °F (36.5 °C)  Pulse:  [60] 60  Resp:  [18] 18  SpO2:  [100 %] 100 %  BP: (153)/(71) 153/71     Weight: 79.4 kg (175 lb)  Body mass index is 25.11 kg/(m^2).  Body surface area is 1.98 meters squared.    No intake or output data in the 24 hours ending 03/02/17 0936    Physical Exam   Constitutional: He is oriented to person, place, and time. He appears well-developed and well-nourished. No distress.   HENT:   Head: Normocephalic.   Mouth/Throat: Oropharynx is clear and moist. No oropharyngeal exudate.   Eyes: Conjunctivae are normal. Pupils are equal, round, and reactive to light. No scleral icterus.       Neck: Normal range of motion. Neck supple. Normal range of motion present. No thyromegaly present.   Cardiovascular: Normal rate, regular rhythm, normal heart sounds and intact distal pulses.    Pulmonary/Chest: Effort normal and breath sounds normal. No respiratory distress.   Abdominal: Soft. Bowel sounds are normal. He exhibits no distension. There is no tenderness.   Musculoskeletal: Normal range of motion. He exhibits no edema or tenderness.   Lymphadenopathy:        Head (right side): No submandibular, no preauricular, no posterior auricular and no occipital adenopathy present.        Head (left side): No submandibular, no preauricular, no posterior auricular and no occipital adenopathy present.     He has no cervical adenopathy.     He has no axillary adenopathy.   Neurological: He is alert and oriented to person, place, and time. No cranial nerve deficit. Coordination normal.   Skin: Skin is warm and dry. No petechiae and no rash noted. No pallor.   Psychiatric: He has a normal mood and affect. Thought content normal.   Vitals reviewed.      Assessment/Plan:     Active Diagnoses:    Diagnosis Date Noted POA    Multiple myeloma [C90.00] 03/02/2017 Yes      Problems Resolved During this Admission:    Diagnosis Date Noted Date Resolved POA     Plan:  76  year old male with MM post 4 cycles of VRD presents today restaging/pre-auto transplant staging bone marrow aspiration and biopsy. He has no complaints today/no contraindications to proceeding with procedure today. Risk vs benefit explained and consent signed.    Emily Parker NP  Hematology/Oncology  [facility]

## 2017-03-02 NOTE — ANESTHESIA RELEASE NOTE
"Anesthesia Release from PACU Note    Patient: Tahir Wynne    Procedure(s) Performed: Procedure(s) (LRB):  BIOPSY-BONE MARROW (Left)    Anesthesia type: GEN    Post pain: Adequate analgesia reported    Post assessment: no apparent anesthetic complications, tolerated procedure well and no evidence of recall    Post vital signs: /60 (BP Location: Right arm, Patient Position: Lying, BP Method: Automatic)  Pulse (!) 55  Temp 36.2 °C (97.2 °F) (Axillary)   Resp 17  Ht 5' 10" (1.778 m)  Wt 79.4 kg (175 lb)  SpO2 97%  BMI 25.11 kg/m2    Level of consciousness: awake, alert and oriented    Nausea/Vomiting: no nausea/no vomiting    Complications: none    Airway Patency: patent    Respiratory: unassisted, spontaneous ventilation, room air    Cardiovascular: stable and blood pressure at baseline    Hydration: euvolemic    "

## 2017-03-02 NOTE — DISCHARGE SUMMARY
[facility]  Hematology/Oncology  Discharge Summary      Patient Name: Tahir Wynne  MRN: [id number]  Admission Date: 3/2/2017  Hospital Length of Stay: 0 days  Discharge Date and Time: 3/2/2017 11:00 AM  Attending Physician: No att. providers found   Discharging Provider: Emily Parker NP  Primary Care Provider: Florian Madrid MD    HPI:  Restaging multiple myeloma    Procedure(s) (LRB):  BIOPSY-BONE MARROW (Left)     Hospital Course:   Patient admitted to pre op today for a bone marrow aspiration and biopsy. Consent obtained for a bone marrow biopsy. Patient was sedated per anesthesia and a bone marrow biopsy and aspiration was performed in the OR (see Op note). Patient was then transferred to post op and discharged home when appropriate per anesthesia.     Pending Diagnostic Studies:     Procedure Component Value Units Date/Time    Bone Marrow Prep and Stain [[id number]] Collected:  03/02/17 0938    Order Status:  Sent Lab Status:  In process Updated:  03/02/17 1026    Specimen:  Bone Marrow from Bone Marrow     Iron Stain, Bone Marrow [[id number]] Collected:  03/02/17 0938    Order Status:  Sent Lab Status:  In process Updated:  03/02/17 1037    Specimen:  Bone Marrow from Bone Marrow         Final Active Diagnoses:    Diagnosis Date Noted POA    PRINCIPAL PROBLEM:  Multiple myeloma not having achieved remission [C90.00] 10/13/2016 Yes      Problems Resolved During this Admission:    Diagnosis Date Noted Date Resolved POA      Discharged Condition: good    Disposition: Home or Self Care    Follow Up: MD will call patient with results and follow-up    Patient Instructions:     Diet general     Activity as tolerated     Call MD for:  temperature >100.4     Call MD for:  persistent nausea and vomiting or diarrhea     Call MD for:  redness, tenderness, or signs of infection (pain, swelling, redness, odor or green/yellow discharge around incision site)     Remove dressing in 24 hours        Medications:  Reconciled Home Medications:   Discharge Medication List as of 3/2/2017 10:27 AM      CONTINUE these medications which have NOT CHANGED    Details   alfuzosin (UROXATRAL) 10 mg Tb24 Starting 9/2/2016, Until Discontinued, Historical Med      alprazolam (XANAX) 0.5 MG tablet Starting 9/23/2016, Until Discontinued, Historical Med      aspirin (ECOTRIN) 81 MG EC tablet Take 81 mg by mouth once daily., Until Discontinued, Historical Med      carvedilol (COREG) 6.25 MG tablet Starting 9/25/2016, Until Discontinued, Historical Med      ciprofloxacin HCl (CILOXAN) 0.3 % ophthalmic solution Starting 8/19/2016, Until Discontinued, Historical Med      dexamethasone (DECADRON) 4 MG Tab Starting 9/28/2016, Until Discontinued, Historical Med      DUREZOL 0.05 % Drop ophthalmic solution Starting 8/18/2016, Until Discontinued, Historical Med      fluorometholone 0.1% (FML) 0.1 % DrpS Starting 7/26/2016, Until Discontinued, Historical Med      hydrocortisone 1 % cream Starting 8/24/2016, Until Discontinued, Historical Med      ILEVRO 0.3 % DrpS Starting 8/18/2016, Until Discontinued, Historical Med      ketoconazole (NIZORAL) 2 % shampoo Starting 7/6/2016, Until Discontinued, Historical Med      latanoprost 0.005 % ophthalmic solution Starting 12/7/2016, Until Discontinued, Historical Med      levothyroxine (SYNTHROID) 125 MCG tablet Starting 7/12/2016, Until Discontinued, Historical Med      multivitamin with minerals tablet Take 1 tablet by mouth once daily., Until Discontinued, Historical Med      naproxen (NAPROSYN) 500 MG tablet Starting 9/19/2016, Until Discontinued, Historical Med      neomycin-polymyxin-hydrocortisone (CORTISPORIN) otic solution Starting 7/12/2016, Until Discontinued, Historical Med      omeprazole (PRILOSEC) 40 MG capsule Starting 7/12/2016, Until Discontinued, Historical Med      prednisoLONE acetate (PRED FORTE) 1 % DrpS Starting 12/12/2016, Until Discontinued, Historical Med       RESTASIS 0.05 % ophthalmic emulsion Starting 12/12/2016, Until Discontinued, Historical Med      REVLIMID 25 mg Cap Starting 12/22/2016, Until Discontinued, Historical Med      rosuvastatin (CRESTOR) 20 MG tablet Take 20 mg by mouth., Until Discontinued, Historical Med      timolol maleate 0.5% (TIMOPTIC) 0.5 % Drop Starting 9/6/2016, Until Discontinued, Historical Med             Emily Parker NP  Hematology/Oncology  [facility]

## 2017-03-02 NOTE — ANESTHESIA POSTPROCEDURE EVALUATION
"Anesthesia Post Evaluation    Patient: Tahir Wynne    Procedure(s) Performed: Procedure(s) (LRB):  BIOPSY-BONE MARROW (Left)    Final Anesthesia Type: general  Patient location during evaluation: PACU  Patient participation: Yes- Able to Participate  Level of consciousness: awake and alert and oriented  Post-procedure vital signs: reviewed and stable  Pain management: adequate  Airway patency: patent  PONV status at discharge: No PONV  Anesthetic complications: no      Cardiovascular status: hemodynamically stable  Respiratory status: unassisted, spontaneous ventilation and room air  Hydration status: euvolemic  Follow-up not needed.        Visit Vitals    /60 (BP Location: Right arm, Patient Position: Lying, BP Method: Automatic)    Pulse (!) 55    Temp 36.2 °C (97.2 °F) (Axillary)    Resp 17    Ht 5' 10" (1.778 m)    Wt 79.4 kg (175 lb)    SpO2 97%    BMI 25.11 kg/m2       Pain/Amaury Score: Pain Assessment Performed: Yes (3/2/2017 10:30 AM)  Presence of Pain: denies (3/2/2017 10:30 AM)  Amaury Score: 9 (3/2/2017 10:30 AM)      "

## 2017-03-02 NOTE — PLAN OF CARE
Pt verbalized readiness for discharge; discharge instructions reviewed with Pt and Pt's daughter; understanding verbalized and handout copy given. VSS. Pt tolerating clear liquids by mouth without difficulty. PIV left in place as ordered for MRI today. Dressing to Pt's left hip remains CDI with no redness or swelling noted. Pt denies any c/o pain or nausea. Pt escorted to MRI via wheelchair with daughter at side.

## 2017-03-02 NOTE — BRIEF OP NOTE
PROCEDURE NOTE:  Date of procedure: 3/2/2017  Bone Marrow aspiration and Biopsy  Indication: Restaging Multiple Myeloma  Consent: Informed consent was obtained from patient.  Timeout: Done and documented.  Position: Right lateral  Site: Left posterior illiac crest.  Prep: Betadine.  Needle used: 11 gauge Jamshidi needle.  Anesthetic: 1% lidocaine 5 cc.  Biopsy: The biopsy needle was introduced into the marrow cavity and an aspirate was obtained without complications for flow and cytogenetics. Core biopsy obtained x 2 without fdifficulty and sent for routine histologic examination.  Complications: None.  EBL: minimal  Disposition: The patient was discharged home per anaesthesia protocol.    Emily Parker NP  Hematology/BMT.

## 2017-03-02 NOTE — TRANSFER OF CARE
"Anesthesia Transfer of Care Note    Patient: Tahir Wynne    Procedure(s) Performed: Procedure(s) (LRB):  BIOPSY-BONE MARROW (Left)    Patient location: OPS    Anesthesia Type: general    Transport from OR: Transported from OR on room air with adequate spontaneous ventilation    Post pain: adequate analgesia    Post assessment: no apparent anesthetic complications    Post vital signs: stable    Level of consciousness: awake    Nausea/Vomiting: no nausea/vomiting    Complications: none          Last vitals:   Visit Vitals    BP (!) 153/71 (BP Location: Left arm, Patient Position: Lying, BP Method: Automatic)    Pulse 60    Temp 36.5 °C (97.7 °F) (Oral)    Resp 18    Ht 5' 10" (1.778 m)    Wt 79.4 kg (175 lb)    SpO2 100%    BMI 25.11 kg/m2     "

## 2017-03-02 NOTE — IP AVS SNAPSHOT
[facility]  [address]  [facility] [address]  Phone: [phone]           Patient Discharge Instructions     Our goal is to set you up for success. This packet includes information on your condition, medications, and your home care. It will help you to care for yourself so you don't get sicker and need to go back to the hospital.     Please ask your nurse if you have any questions.        There are many details to remember when preparing to leave the hospital. Here is what you will need to do:    1. Take your medicine. If you are prescribed medications, review your Medication List in the following pages. You may have new medications to  at the pharmacy and others that you'll need to stop taking. Review the instructions for how and when to take your medications. Talk with your doctor or nurses if you are unsure of what to do.     2. Go to your follow-up appointments. Specific follow-up information is listed in the following pages. Your may be contacted by a transition nurse or clinical provider about future appointments. Be sure we have all of the phone numbers to reach you, if needed. Please contact your provider's office if you are unable to make an appointment.     3. Watch for warning signs. Your doctor or nurse will give you detailed warning signs to watch for and when to call for assistance. These instructions may also include educational information about your condition. If you experience any of warning signs to your health, call your doctor.               [facility]  Unless otherwise directed by your provider, please contact [facility], our nurse care line that is available for 24/7 assistance.     [phone] (toll-free)    Registered nurses in the [facility] provide clinical advisement, health education, appointment booking, and other advisory services.                    ** Verify the list of medication(s) below is accurate and up  to date. Carry this with you in case of emergency. If your medications have changed, please notify your healthcare provider.             Medication List      ASK your doctor about these medications        Additional Info                      alfuzosin 10 mg Tb24   Commonly known as:  UROXATRAL   Refills:  0      Begin Date    AM    Noon    PM    Bedtime       alprazolam 0.5 MG tablet   Commonly known as:  XANAX   Refills:  0      Begin Date    AM    Noon    PM    Bedtime       aspirin 81 MG EC tablet   Commonly known as:  ECOTRIN   Refills:  0   Dose:  81 mg    Instructions:  Take 81 mg by mouth once daily.     Begin Date    AM    Noon    PM    Bedtime       carvedilol 6.25 MG tablet   Commonly known as:  COREG   Refills:  0      Begin Date    AM    Noon    PM    Bedtime       ciprofloxacin HCl 0.3 % ophthalmic solution   Commonly known as:  CILOXAN   Refills:  0      Begin Date    AM    Noon    PM    Bedtime       dexamethasone 4 MG Tab   Commonly known as:  DECADRON   Refills:  0      Begin Date    AM    Noon    PM    Bedtime       DUREZOL 0.05 % Drop ophthalmic solution   Refills:  0   Generic drug:  difluprednate      Begin Date    AM    Noon    PM    Bedtime       fluorometholone 0.1% 0.1 % Drps   Commonly known as:  FML   Refills:  0      Begin Date    AM    Noon    PM    Bedtime       hydrocortisone 1 % cream   Refills:  0      Begin Date    AM    Noon    PM    Bedtime       ILEVRO 0.3 % Drps   Refills:  0   Generic drug:  nepafenac      Begin Date    AM    Noon    PM    Bedtime       ketoconazole 2 % shampoo   Commonly known as:  NIZORAL   Refills:  0      Begin Date    AM    Noon    PM    Bedtime       latanoprost 0.005 % ophthalmic solution   Refills:  0      Begin Date    AM    Noon    PM    Bedtime       levothyroxine 125 MCG tablet   Commonly known as:  SYNTHROID   Refills:  0      Begin Date    AM    Noon    PM    Bedtime       multivitamin with minerals tablet   Refills:  0   Dose:  1 tablet     Instructions:  Take 1 tablet by mouth once daily.     Begin Date    AM    Noon    PM    Bedtime       naproxen 500 MG tablet   Commonly known as:  NAPROSYN   Refills:  0      Begin Date    AM    Noon    PM    Bedtime       neomycin-polymyxin-hydrocortisone otic solution   Commonly known as:  CORTISPORIN   Refills:  0      Begin Date    AM    Noon    PM    Bedtime       omeprazole 40 MG capsule   Commonly known as:  PRILOSEC   Refills:  0      Begin Date    AM    Noon    PM    Bedtime       prednisoLONE acetate 1 % Drps   Commonly known as:  PRED FORTE   Refills:  0      Begin Date    AM    Noon    PM    Bedtime       RESTASIS 0.05 % ophthalmic emulsion   Refills:  0   Generic drug:  cycloSPORINE      Begin Date    AM    Noon    PM    Bedtime       REVLIMID 25 mg Cap   Refills:  0   Generic drug:  lenalidomide      Begin Date    AM    Noon    PM    Bedtime       rosuvastatin 20 MG tablet   Commonly known as:  CRESTOR   Refills:  0   Dose:  20 mg    Instructions:  Take 20 mg by mouth.     Begin Date    AM    Noon    PM    Bedtime       timolol maleate 0.5% 0.5 % Drop   Commonly known as:  TIMOPTIC   Refills:  0      Begin Date    AM    Noon    PM    Bedtime                  Please bring to all follow up appointments:    1. A copy of your discharge instructions.  2. All medicines you are currently taking in their original bottles.  3. Identification and insurance card.    Please arrive 15 minutes ahead of scheduled appointment time.    Please call 24 hours in advance if you must reschedule your appointment and/or time.        Your Scheduled Appointments     Mar 02, 2017 12:00 PM CST   Mri Brain Cont with [facility] MRI WIDE BORE   [facility] [address] [facility]    [address]  [facility] [address]   [phone]                  Discharge Instructions       Discharge instructions for having a Bone Marrow Aspiration / Biopsy    Keep Bandage in place for 24 hours.  - Do not shower or take a tub  bath during this time. (you may sponge bathe).  - Call the nurse or physician for excessive bleeding or pain at biopsy site.  - You may take Tylenol as needed for pain.    You have received medication to sedate you.  - Do not drive a car or operate heavy machinery for the rest of the day.  - You may resume other activities as tolerated.    You can call [phone] for any problems during the hours of 8:00 AM-5:00PM.    For an emergency after 5:00 PM you can call [phone] and have the  page the Hematologist / Oncologist on call.       Admission Information     Date & Time Provider Department CSN    3/2/2017  8:25 AM Warren Park MD [facility] [id number]      Care Providers     Provider Role Specialty Primary office phone    Warren Park MD Attending Provider Hematology and Oncology [phone]    Warren Park MD Surgeon  Hematology and Oncology [phone]      Your Vitals Were     BP                   110/59 (BP Location: Right arm, Patient Position: Lying, BP Method: Automatic)           Recent Lab Values     No lab values to display.      Pending Labs     Order Current Status    Tissue Specimen to Pathology, Bone Marrow Aspiration/Biopsy Procedure Collected (03/02/17 09)    Bone Marrow Prep and Stain In process    Chromosome Analysis, Bone Marrow In process    Iron Stain, Bone Marrow In process    Leukemia/Lymphoma Screen - Bone Marrow Left Posterior Iliac Crest In process    Plasma Cell Proliferative Disorder (PCPD), FISH Preliminary result      Allergies as of 3/2/2017        Reactions    Levofloxacin Hives    Decongest Tabs     All over the counter medications containing decongestive.       Advance Directives     An advance directive is a document which, in the event you are no longer able to make decisions for yourself, tells your healthcare team what kind of treatment you do or do not want to receive, or who you would like to make those decisions for  you.  If you do not currently have an advance directive, [facility] encourages you to create one.  For more information call:  [phone]), [phone] ([phone]),  or log on to [email or web address].        Smoking Cessation     If you would like to quit smoking:   You may be eligible for free services if you are a [place] resident and started smoking cigarettes before September 1, 1988.  Call the [employer]oking [employer]essation [employer] ([employer]) toll free at (399) 827-9227 or (134) 482-8164.   Call 8-256-QUIT-NOW if you do not meet the above criteria.            Language Assistance Services     ATTENTION: Language assistance services are available, free of charge. Please call [phone].      ATENCIÓN: Si habla sherif, tiene a joya disposición servicios gratuitos de asistencia lingüística. Llame al [phone].     CHÚ Ý: N?u b?n nói Ti?ng Vi?t, có các d?ch v? h? tr? ngôn ng? mi?n phí dành cho b?n. G?i s? [phone].        [facility] Sign-Up     Activating your [facility] account is as easy as 1-2-3!     1) Visit [email or web address], select Sign Up Now, enter this activation code and your date of birth, then select Next.  [email or web address]  Expires: 4/16/2017 10:27 AM      2) Create a username and password to use when you visit [facility] in the future and select a security question in case you lose your password and select Next.    3) Enter your e-mail address and click Sign Up!    Additional Information  If you have questions, please e-mail [email or web address] or call [phone] to talk to our [facility] staff. Remember, [facility] is NOT to be used for urgent needs. For medical emergencies, dial 911.          [facility] complies with applicable Federal civil rights laws and does not discriminate on the basis of race, color, national origin, age, disability, or sex.

## 2017-03-03 LAB
BODY SITE - BONE MARROW: NORMAL
BONE MARROW IRON STAIN COMMENT: NORMAL
BONE MARROW WRIGHT STAIN COMMENT: NORMAL
CLINICAL DIAGNOSIS - BONE MARROW: NORMAL
FLOW CYTOMETRY ANTIBODIES ANALYZED - BONE MARROW: NORMAL
FLOW CYTOMETRY COMMENT - BONE MARROW: NORMAL
FLOW CYTOMETRY INTERPRETATION - BONE MARROW: NORMAL

## 2017-03-07 ENCOUNTER — TELEPHONE (OUTPATIENT)
Dept: HEMATOLOGY/ONCOLOGY | Facility: CLINIC | Age: 77
End: 2017-03-07

## 2017-03-07 NOTE — TELEPHONE ENCOUNTER
----- Message from Christiano Cyr sent at 3/7/2017 11:08 AM CST -----  Contact: pt daughter (Barbara)  Pt daughter is returning a missed call regarding results from labs.  Contact number [phone]

## 2017-03-07 NOTE — TELEPHONE ENCOUNTER
Returned call to patient's daughter, mignon, who is calling to request to speak with dr agudelo about his bmbx and mri results.  Daughter is requesting to be called back at [phone] or [phone].    Nurse informed daughter she would forward message to dr agudelo. Daughter voiced understanding.        Message routed to dr agudelo

## 2017-03-09 ENCOUNTER — DOCUMENTATION ONLY (OUTPATIENT)
Dept: INFUSION THERAPY | Facility: HOSPITAL | Age: 77
End: 2017-03-09

## 2017-03-09 NOTE — PROGRESS NOTES
Reviewed results of march 2017 restaging with patient and his children on phone; including bone marrow biopsy, MRI, and blood work showing essentially a Complete remission of his myeloma following 4 cycles of VRd.  Discussed that given his deep response with IMid/PI combination and  and the significantly increased mortality risk of auto transplant in 76 yo that our BMT group would recommend proceeding with 4 additional cycles of VRd (to complete 8), restage locally, and if still in IMWG [place] or better transition to maintenance revlimid.   He does not need scheduled follow up with us but we are happy to see him back should any new clinical issues arise.  Above plan was also discussed today with the referring oncologist, Dr. Quan.     Bernardo Park MD  Hematology & Stem Cell Transplant

## 2017-03-10 LAB
CHROM BANDING METHOD: NORMAL
CHROMOSOME ANALYSIS BM ADDITIONAL INFORMATION: NORMAL
CHROMOSOME ANALYSIS BM RELEASED BY: NORMAL
CHROMOSOME ANALYSIS BM RESULT SUMMARY: NORMAL
CLINICAL CYTOGENETICIST REVIEW: NORMAL
KARYOTYP MAR: NORMAL
REASON FOR REFERRAL (NARRATIVE): NORMAL
REF LAB TEST METHOD: NORMAL
SPECIMEN SOURCE: NORMAL
SPECIMEN: NORMAL

## 2017-03-13 LAB
GENETICIST REVIEW: NORMAL
PLASMA CELL PROLIF RELEASED BY: NORMAL
PLASMA CELL PROLIF RESULT SUMMARY: NORMAL
PLASMA CELL PROLIF RESULT TABLE: NORMAL
REASON FOR REFERRAL, PLASMA CELL PROLIF (PCPD), FISH: NORMAL
REF LAB TEST METHOD: NORMAL
RESULTS, PLASMA CELL PROLIF (PCPD), FISH: NORMAL
SERVICE CMNT-IMP: NORMAL
SERVICE CMNT-IMP: NORMAL
SPECIMEN SOURCE: NORMAL
SPECIMEN, PLASMA CELL PROLIF (PCPD), FISH: NORMAL

## 2017-03-14 ENCOUNTER — TELEPHONE (OUTPATIENT)
Dept: HEMATOLOGY/ONCOLOGY | Facility: CLINIC | Age: 77
End: 2017-03-14

## 2017-03-14 NOTE — TELEPHONE ENCOUNTER
Returned call and spoke with Ms Jimenez, patient's daughter. Informed her the MRI results were faxed to Dr Madrid office, per her request.   Ms Jimenez asking to discuss with Dr Dickson the recent eye doctor visit of Mr Wynne. Patient has had a status change with the pressure to his Right eye and the Ophthalmologist suggest she discuss this further with Dr Dickson. Please returned her call to [phone].

## 2017-03-14 NOTE — TELEPHONE ENCOUNTER
----- Message from Christiano Cyr sent at 3/14/2017  8:54 AM CDT -----  Contact: pt daughter (Barbara)  Pt daughter is calling to get a copy of (MRI) send to   office.  Contact number [phone]  Contact number for ([phone])

## 2017-03-24 DIAGNOSIS — C90.30 PLASMACYTOMA: Primary | ICD-10-CM

## 2017-04-17 ENCOUNTER — HOSPITAL ENCOUNTER (OUTPATIENT)
Dept: RADIOLOGY | Facility: HOSPITAL | Age: 77
Discharge: HOME | End: 2017-04-17
Attending: INTERNAL MEDICINE
Payer: MEDICARE

## 2017-04-17 DIAGNOSIS — C90.00 MULTIPLE MYELOMA NOT HAVING ACHIEVED REMISSION: ICD-10-CM

## 2017-04-17 DIAGNOSIS — C90.30 PLASMACYTOMA: Primary | ICD-10-CM

## 2017-04-17 DIAGNOSIS — C90.00 MULTIPLE MYELOMA: ICD-10-CM

## 2017-04-17 PROCEDURE — 70543 MRI ORBT/FAC/NCK W/O &W/DYE: CPT | Mod: 26,,, | Performed by: RADIOLOGY

## 2017-04-17 PROCEDURE — A9585 GADOBUTROL INJECTION: HCPCS | Performed by: INTERNAL MEDICINE

## 2017-04-17 PROCEDURE — 70543 MRI ORBT/FAC/NCK W/O &W/DYE: CPT | Mod: TC

## 2017-04-17 PROCEDURE — 25500020 PHARM REV CODE 255: Performed by: INTERNAL MEDICINE

## 2017-04-17 RX ORDER — GADOBUTROL 604.72 MG/ML
7 INJECTION INTRAVENOUS
Status: COMPLETED | OUTPATIENT
Start: 2017-04-17 | End: 2017-04-17

## 2017-04-17 RX ADMIN — GADOBUTROL 7 ML: 604.72 INJECTION INTRAVENOUS at 12:04

## 2017-05-02 ENCOUNTER — TELEPHONE (OUTPATIENT)
Dept: HEMATOLOGY/ONCOLOGY | Facility: CLINIC | Age: 77
End: 2017-05-02

## 2017-05-02 NOTE — TELEPHONE ENCOUNTER
----- Message from Shanon Escobar sent at 5/2/2017  9:53 AM CDT -----  Contact: Daughter  Has a few questions regarding MRI and bone marrow.    Call: [phone]       Left message

## 2017-05-02 NOTE — PROGRESS NOTES
Spoke with patients daughter. They are currently about to initiate cycle 8 of VRd with Dr. Madrid but would like to transition all myeloma and opthalmology care to [facility].  He will return on 5/22 for labs (.cbc, cmp, serum free light chains, quantitative immunoglobulins, serum electropheresis, serum immunofixation), bone marrow biopsy, appt with me, and appt with opthalmologist.  If remission confirmed likely transition to maintenance revlimid at that point.    Bernardo Park MD  Hematology & Stem Cell Transplant

## 2017-05-22 ENCOUNTER — TELEPHONE (OUTPATIENT)
Dept: HEMATOLOGY/ONCOLOGY | Facility: CLINIC | Age: 77
End: 2017-05-22

## 2017-05-22 ENCOUNTER — OFFICE VISIT (OUTPATIENT)
Dept: HEMATOLOGY/ONCOLOGY | Facility: CLINIC | Age: 77
End: 2017-05-22
Payer: MEDICARE

## 2017-05-22 ENCOUNTER — LAB VISIT (OUTPATIENT)
Dept: LAB | Facility: HOSPITAL | Age: 77
End: 2017-05-22
Attending: INTERNAL MEDICINE
Payer: MEDICARE

## 2017-05-22 VITALS
TEMPERATURE: 98 F | DIASTOLIC BLOOD PRESSURE: 71 MMHG | BODY MASS INDEX: 25.38 KG/M2 | SYSTOLIC BLOOD PRESSURE: 155 MMHG | WEIGHT: 177.25 LBS | HEIGHT: 70 IN | HEART RATE: 69 BPM

## 2017-05-22 DIAGNOSIS — C90.00 MULTIPLE MYELOMA NOT HAVING ACHIEVED REMISSION: Primary | ICD-10-CM

## 2017-05-22 DIAGNOSIS — C90.00 MULTIPLE MYELOMA: Primary | ICD-10-CM

## 2017-05-22 DIAGNOSIS — Z76.82 STEM CELL TRANSPLANT CANDIDATE: ICD-10-CM

## 2017-05-22 DIAGNOSIS — C90.00 MULTIPLE MYELOMA, REMISSION STATUS UNSPECIFIED: ICD-10-CM

## 2017-05-22 DIAGNOSIS — C90.20 PLASMACYTOMA, EXTRAMEDULLARY: ICD-10-CM

## 2017-05-22 LAB
ALBUMIN SERPL BCP-MCNC: 3.5 G/DL
ALP SERPL-CCNC: 51 U/L
ALT SERPL W/O P-5'-P-CCNC: 23 U/L
ANION GAP SERPL CALC-SCNC: 8 MMOL/L
AST SERPL-CCNC: 15 U/L
BASOPHILS # BLD AUTO: 0.01 K/UL
BASOPHILS NFR BLD: 0.3 %
BILIRUB SERPL-MCNC: 0.4 MG/DL
BUN SERPL-MCNC: 28 MG/DL
CALCIUM SERPL-MCNC: 8.8 MG/DL
CHLORIDE SERPL-SCNC: 107 MMOL/L
CO2 SERPL-SCNC: 22 MMOL/L
CREAT SERPL-MCNC: 1.4 MG/DL
DIFFERENTIAL METHOD: ABNORMAL
EOSINOPHIL # BLD AUTO: 0 K/UL
EOSINOPHIL NFR BLD: 0.3 %
ERYTHROCYTE [DISTWIDTH] IN BLOOD BY AUTOMATED COUNT: 16.3 %
EST. GFR  (AFRICAN AMERICAN): 55.6 ML/MIN/1.73 M^2
EST. GFR  (NON AFRICAN AMERICAN): 48.1 ML/MIN/1.73 M^2
GLUCOSE SERPL-MCNC: 214 MG/DL
HCT VFR BLD AUTO: 37.9 %
HGB BLD-MCNC: 12.3 G/DL
IGA SERPL-MCNC: 67 MG/DL
IGG SERPL-MCNC: 523 MG/DL
IGM SERPL-MCNC: 34 MG/DL
LYMPHOCYTES # BLD AUTO: 0.4 K/UL
LYMPHOCYTES NFR BLD: 13.8 %
MCH RBC QN AUTO: 29.8 PG
MCHC RBC AUTO-ENTMCNC: 32.5 %
MCV RBC AUTO: 92 FL
MONOCYTES # BLD AUTO: 0.1 K/UL
MONOCYTES NFR BLD: 2.8 %
NEUTROPHILS # BLD AUTO: 2.4 K/UL
NEUTROPHILS NFR BLD: 82.1 %
PLATELET # BLD AUTO: 85 K/UL
PMV BLD AUTO: 12.2 FL
POTASSIUM SERPL-SCNC: 4.6 MMOL/L
PROT SERPL-MCNC: 6.2 G/DL
RBC # BLD AUTO: 4.13 M/UL
SODIUM SERPL-SCNC: 137 MMOL/L
WBC # BLD AUTO: 2.89 K/UL

## 2017-05-22 PROCEDURE — 84165 PROTEIN E-PHORESIS SERUM: CPT | Mod: 26,,, | Performed by: PATHOLOGY

## 2017-05-22 PROCEDURE — 99214 OFFICE O/P EST MOD 30 MIN: CPT | Mod: S$PBB,,, | Performed by: INTERNAL MEDICINE

## 2017-05-22 PROCEDURE — 84165 PROTEIN E-PHORESIS SERUM: CPT

## 2017-05-22 PROCEDURE — 82784 ASSAY IGA/IGD/IGG/IGM EACH: CPT | Mod: 59

## 2017-05-22 PROCEDURE — 86334 IMMUNOFIX E-PHORESIS SERUM: CPT

## 2017-05-22 PROCEDURE — 80053 COMPREHEN METABOLIC PANEL: CPT

## 2017-05-22 PROCEDURE — 83520 IMMUNOASSAY QUANT NOS NONAB: CPT | Mod: 59

## 2017-05-22 PROCEDURE — 36415 COLL VENOUS BLD VENIPUNCTURE: CPT

## 2017-05-22 PROCEDURE — 85025 COMPLETE CBC W/AUTO DIFF WBC: CPT

## 2017-05-22 PROCEDURE — 99999 PR PBB SHADOW E&M-EST. PATIENT-LVL III: CPT | Mod: PBBFAC,,, | Performed by: INTERNAL MEDICINE

## 2017-05-22 PROCEDURE — 86334 IMMUNOFIX E-PHORESIS SERUM: CPT | Mod: 26,,, | Performed by: PATHOLOGY

## 2017-05-22 NOTE — Clinical Note
cbc, cmp, serum free light chains, quantitative immunoglobulins, serum electropheresis, serum immunofixation and MD appt in 4 weeks

## 2017-05-22 NOTE — TELEPHONE ENCOUNTER
Called pt to schedule his BMBX , pt was told to report to the second floor in the main hospital Same Day Surgery Dept. Pt was told to be NPO starting at midnight the day prior to surgery. Pt was advised to hold all blood thinning medications prior to his BMBX & was advised to have a ride home. Pt voiced verbal understanding of these directions. Will also mail instruction sheet to pt's home.  Case request pended and routed to Nicki Ambrocio.    Elvia Rojas

## 2017-05-22 NOTE — PROGRESS NOTES
SECTION OF HEMATOLOGY AND BONE MARROW TRANSPLANT  Return Patient Visit   05/23/2017  Referred by:  No ref. provider found  Referred for:  MM, autoSCT    CHIEF COMPLAINT:   Chief Complaint   Patient presents with    Multiple Myeloma       HISTORY OF PRESENT ILLNESS:   75 yo male with pmh as below, all well controlled on current medications; diagnosis of systemic myeloma, cytogenetic studies normal.  Initially presentedfor cataract surgery.  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  Patient subsequently completed 23 planned fractions of XRT + dex with rad onc in [place].     Both have resulted in improvement in proptosis and vision.  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  PET scan showed known orbit lesion as well as multiple other sites of  Bone disease.    Since our last appt he is overall doing well.  He is currently receiving cycle #9 of VRd and tolerating with no significant side effects.  He continues to have blurry vision in the right eye. MRI of orbits done 4/17/17 showed persistent changes likely representing scar tissue from treated plasmacytoma.  He has fu appt with opthalmology here to evaluation for possible intervention that may restore sight.  Comes to clinic with his daughter and wife.     PAST MEDICAL HISTORY:   Past Medical History:   Diagnosis Date    Allergy     Anxiety     Cataract     Glaucoma     Hypertension     denies htn states takes coreg for rhythm    Prostate cancer     Thyroid disease        PAST SURGICAL HISTORY:   Past Surgical History:   Procedure Laterality Date    BRAIN SURGERY  2010    optic nerve tumor    broken leg Right     CARPAL TUNNEL RELEASE      EYE SURGERY      SHOULDER SURGERY         PAST SOCIAL HISTORY:   reports that he has quit smoking. He does not have any smokeless tobacco history on file. He reports  that he drinks about 4.2 oz of alcohol per week . He reports that he does not use drugs.    FAMILY HISTORY:  Family History   Problem Relation Age of Onset    Cancer Mother     Prostate cancer Father     Coronary artery disease Father     Dementia Sister     Stroke Brother     Dementia Brother     No Known Problems Brother     Dementia Brother     No Known Problems Brother     Pancreatitis Brother     Cancer Brother     No Known Problems Sister     No Known Problems Sister        CURRENT MEDICATIONS:   Current Outpatient Prescriptions   Medication Sig    alfuzosin (UROXATRAL) 10 mg Tb24     aspirin (ECOTRIN) 81 MG EC tablet Take 81 mg by mouth once daily.    carvedilol (COREG) 6.25 MG tablet     dexamethasone (DECADRON) 4 MG Tab     hydrocortisone 1 % cream     ketoconazole (NIZORAL) 2 % shampoo     latanoprost 0.005 % ophthalmic solution     levothyroxine (SYNTHROID) 125 MCG tablet     multivitamin with minerals tablet Take 1 tablet by mouth once daily.    naproxen (NAPROSYN) 500 MG tablet     neomycin-polymyxin-hydrocortisone (CORTISPORIN) otic solution     omeprazole (PRILOSEC) 40 MG capsule     RESTASIS 0.05 % ophthalmic emulsion     REVLIMID 25 mg Cap     rosuvastatin (CRESTOR) 20 MG tablet Take 20 mg by mouth.     No current facility-administered medications for this visit.      ALLERGIES:   Review of patient's allergies indicates:   Allergen Reactions    Decongest tabs      All over the counter medications containing decongestive.              REVIEW OF SYSTEMS:   General ROS: negative  Psychological ROS: negative  Ophthalmic ROS: see HPI  ENT ROS: + right eye blurry vision   Allergy and Immunology ROS: negative  Hematological and Lymphatic ROS: negative  Endocrine ROS: negative  Respiratory ROS: negative  Cardiovascular ROS: negative  Gastrointestinal ROS: negative  Genito-Urinary ROS: negative  Musculoskeletal ROS: negative  Neurological ROS: negative  Dermatological ROS:  negative    PHYSICAL EXAM:   Vitals:    05/22/17 1518   BP: (!) 155/71   Pulse: 69   Temp: 97.5 °F (36.4 °C)       General - well developed, well nourished, no apparent distress  Head & Face - no sinus tenderness  Eyes - normal conjunctivae and lids   ENT - normal external auditory canals and tympanic membranes bilaterally oropharynx clear,  Normal dentition and gums  Neck - normal thyroid  Chest and Lung - normal respiratory effort, clear to auscultation bilaterally   Cardiovascular - RRR with no MGR, normal S1 and S2; no pedal edema  Abdomen -  soft, nontender, no palpable hepatomegaly or splenomegaly  Lymph - no palpable lymphadenopathy  Extremities - unremarkable nails and digits  Heme - no bruising, petechiae, pallor  Skin - no rashes or lesions  Psych - appropriate mood and affect      ECOG Performance Status: (foot note - ECOG PS provided by [employer]cology [employer]) 1 - Symptomatic but completely ambulatory    Karnofsky Performance Score:  90%- Able to Carry on Normal Activity: Minor Symptoms of Disease  DATA: .  Lab Results   Component Value Date    WBC 2.89 (L) 05/22/2017    HGB 12.3 (L) 05/22/2017    HCT 37.9 (L) 05/22/2017    MCV 92 05/22/2017    PLT 85 (L) 05/22/2017     Gran #   Date Value Ref Range Status   05/22/2017 2.4 1.8 - 7.7 K/uL Final     Gran%   Date Value Ref Range Status   05/22/2017 82.1 (H) 38.0 - 73.0 % Final     Lymph #   Date Value Ref Range Status   05/22/2017 0.4 (L) 1.0 - 4.8 K/uL Final     Lymph%   Date Value Ref Range Status   05/22/2017 13.8 (L) 18.0 - 48.0 % Final     CMP  Sodium   Date Value Ref Range Status   05/22/2017 137 136 - 145 mmol/L Final     Potassium   Date Value Ref Range Status   05/22/2017 4.6 3.5 - 5.1 mmol/L Final     Chloride   Date Value Ref Range Status   05/22/2017 107 95 - 110 mmol/L Final     CO2   Date Value Ref Range Status   05/22/2017 22 (L) 23 - 29 mmol/L Final     Glucose   Date Value Ref Range Status   05/22/2017 214 (H) 70 - 110 mg/dL  Final     BUN, Bld   Date Value Ref Range Status   05/22/2017 28 (H) 8 - 23 mg/dL Final     Creatinine   Date Value Ref Range Status   05/22/2017 1.4 0.5 - 1.4 mg/dL Final     Calcium   Date Value Ref Range Status   05/22/2017 8.8 8.7 - 10.5 mg/dL Final     Total Protein   Date Value Ref Range Status   05/22/2017 6.2 6.0 - 8.4 g/dL Final     Albumin   Date Value Ref Range Status   05/22/2017 3.5 3.5 - 5.2 g/dL Final     Total Bilirubin   Date Value Ref Range Status   05/22/2017 0.4 0.1 - 1.0 mg/dL Final     Comment:     For infants and newborns, interpretation of results should be based  on gestational age, weight and in agreement with clinical  observations.  Premature Infant recommended reference ranges:  Up to 24 hours.............<8.0 mg/dL  Up to 48 hours............<12.0 mg/dL  3-5 days..................<15.0 mg/dL  6-29 days.................<15.0 mg/dL       Alkaline Phosphatase   Date Value Ref Range Status   05/22/2017 51 (L) 55 - 135 U/L Final     AST   Date Value Ref Range Status   05/22/2017 15 10 - 40 U/L Final     ALT   Date Value Ref Range Status   05/22/2017 23 10 - 44 U/L Final     Anion Gap   Date Value Ref Range Status   05/22/2017 8 8 - 16 mmol/L Final     eGFR if    Date Value Ref Range Status   05/22/2017 55.6 (A) >60 mL/min/1.73 m^2 Final     eGFR if non    Date Value Ref Range Status   05/22/2017 48.1 (A) >60 mL/min/1.73 m^2 Final     Comment:     Calculation used to obtain the estimated glomerular filtration  rate (eGFR) is the CKD-EPI equation. Since race is unknown   in our information system, the eGFR values for   -American and Non--American patients are given   for each creatinine result.       IgG - Serum   Date Value Ref Range Status   05/22/2017 523 (L) 650 - 1600 mg/dL Final     Comment:     IgG Cord Blood Reference Range: 650-1600 mg/dL.     IgA   Date Value Ref Range Status   05/22/2017 67 40 - 350 mg/dL Final     Comment:     IgA Cord  Blood Reference Range: <5 mg/dL.     IgM   Date Value Ref Range Status   05/22/2017 34 (L) 50 - 300 mg/dL Final     Comment:     IgM Cord Blood Reference Range: <25 mg/dL.     Kappa Free Light Chains   Date Value Ref Range Status   05/22/2017 1.59 0.33 - 1.94 mg/dL Final   01/06/2017 1.96 (H) 0.33 - 1.94 mg/dL Final   10/13/2016 1.02 0.33 - 1.94 mg/dL Final     Lambda Free Light Chains   Date Value Ref Range Status   05/22/2017 1.80 0.57 - 2.63 mg/dL Final   01/06/2017 4.29 (H) 0.57 - 2.63 mg/dL Final   10/13/2016 38.98 (H) 0.57 - 2.63 mg/dL Final     Kappa/Lambda FLC Ratio   Date Value Ref Range Status   05/22/2017 0.88 0.26 - 1.65 Final   01/06/2017 0.46 0.26 - 1.65 Final   10/13/2016 0.03 (L) 0.26 - 1.65 Final      MRI orbit - 4/171/17  Narrative     Comparison CT 2016 and MRI 03/02/2017 studies.  MRI brain face and orbits without and with gadolinium 7 cc intravenous.    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      No new intraosseous or intracranial lesions.  The vertebral, basilar common internal carotid arteries and major branches remain patent.  Few tiny foci  hyperattenuation posterior frontal high convexity subcortical deep white matter unchanged.  Central and cortical atrophy appropriate for age.    Mucous retention cyst right maxillary sinus, post cataract surgery on right stable.  Mucosal hypertrophy of the ethmoid and frontal sinuses unchanged.   Impression      1. Right sphenoid wing intraosseous lesion attributed to plasmacytoma is stable, allowing for differences in technique and positioning.    2.  Brain stable with few tiny areas of focal remote gliosis.       PLEASE SEE EPIC MEDIA TAB FOR ALL IMAGING, PATHOLOGY REPORTS FROM OSH  ASSESSMENT AND PLAN:   Encounter Diagnoses   Name Primary?   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 now symptomatically improved  -he is now s/p 8 cycles of VRd and currently receiving cycle #9; biochemical studies and bone marrow biopsy from march 2017 show a near CR to therapy thus far  -suspect given systemic reponse and XRT to this area that the MRI finding represent scar tissue; he will follow up with opthalmology her next week for fu for possible intervention   -may need PET scan to further elucidate lesion and ensure not active plasmacytoma pending opthalmology evaluation  -recommend restaging bone marrow biopsy next week; today's biochemical studies are pending   -if VGPR or better confirmed then would recommend transitioning to revlimid maintenance  -continue Velcade/Dex locally with Dr. Madrid until bone marrow biopsy    supportive acyclovir, asa, bisphosphanate with   -to have colonscopy to have cecal abnormality; will inquire if he had this at next appt     Follow Up: cbc, cmp, serum free light chains, quantitative immunoglobulins, serum electropheresis, serum immunofixation and MD appt in 4 weeks     Bernardo Park MD  Hematology/Oncology/Bone Marrow Transplant

## 2017-05-23 LAB
ALBUMIN SERPL ELPH-MCNC: 3.67 G/DL
ALPHA1 GLOB SERPL ELPH-MCNC: 0.31 G/DL
ALPHA2 GLOB SERPL ELPH-MCNC: 0.74 G/DL
B-GLOBULIN SERPL ELPH-MCNC: 0.57 G/DL
GAMMA GLOB SERPL ELPH-MCNC: 0.52 G/DL
INTERPRETATION SERPL IFE-IMP: NORMAL
KAPPA LC SER QL IA: 1.59 MG/DL
KAPPA LC/LAMBDA SER IA: 0.88
LAMBDA LC SER QL IA: 1.8 MG/DL
PROT SERPL-MCNC: 5.8 G/DL

## 2017-05-24 LAB
PATHOLOGIST INTERPRETATION IFE: NORMAL
PATHOLOGIST INTERPRETATION SPE: NORMAL

## 2017-05-26 ENCOUNTER — TELEPHONE (OUTPATIENT)
Dept: HEMATOLOGY/ONCOLOGY | Facility: CLINIC | Age: 77
End: 2017-05-26

## 2017-05-29 ENCOUNTER — INITIAL CONSULT (OUTPATIENT)
Dept: OPHTHALMOLOGY | Facility: CLINIC | Age: 77
End: 2017-05-29
Payer: MEDICARE

## 2017-05-29 ENCOUNTER — HOSPITAL ENCOUNTER (OUTPATIENT)
Facility: HOSPITAL | Age: 77
Discharge: HOME | End: 2017-05-29
Attending: INTERNAL MEDICINE | Admitting: INTERNAL MEDICINE
Payer: MEDICARE

## 2017-05-29 ENCOUNTER — ANESTHESIA (OUTPATIENT)
Dept: SURGERY | Facility: HOSPITAL | Age: 77
End: 2017-05-29
Payer: MEDICARE

## 2017-05-29 ENCOUNTER — ANESTHESIA EVENT (OUTPATIENT)
Dept: SURGERY | Facility: HOSPITAL | Age: 77
End: 2017-05-29
Payer: MEDICARE

## 2017-05-29 VITALS
HEIGHT: 70 IN | OXYGEN SATURATION: 100 % | TEMPERATURE: 98 F | BODY MASS INDEX: 25.05 KG/M2 | DIASTOLIC BLOOD PRESSURE: 78 MMHG | HEART RATE: 61 BPM | WEIGHT: 175 LBS | SYSTOLIC BLOOD PRESSURE: 154 MMHG | RESPIRATION RATE: 16 BRPM

## 2017-05-29 DIAGNOSIS — C90.01 MULTIPLE MYELOMA IN REMISSION: Primary | ICD-10-CM

## 2017-05-29 DIAGNOSIS — H35.371 ERM OD (EPIRETINAL MEMBRANE, RIGHT EYE): ICD-10-CM

## 2017-05-29 DIAGNOSIS — C90.30 PLASMACYTOMA OF BONE: ICD-10-CM

## 2017-05-29 LAB — BONE MARROW WRIGHT STAIN COMMENT: NORMAL

## 2017-05-29 PROCEDURE — 88185 FLOWCYTOMETRY/TC ADD-ON: CPT | Performed by: PATHOLOGY

## 2017-05-29 PROCEDURE — 36000705 HC OR TIME LEV I EA ADD 15 MIN: Performed by: INTERNAL MEDICINE

## 2017-05-29 PROCEDURE — 37000008 HC ANESTHESIA 1ST 15 MINUTES: Performed by: INTERNAL MEDICINE

## 2017-05-29 PROCEDURE — D9220A PRA ANESTHESIA: Mod: ANES,,, | Performed by: ANESTHESIOLOGY

## 2017-05-29 PROCEDURE — 88313 SPECIAL STAINS GROUP 2: CPT | Mod: 26,,, | Performed by: PATHOLOGY

## 2017-05-29 PROCEDURE — 36000704 HC OR TIME LEV I 1ST 15 MIN: Performed by: INTERNAL MEDICINE

## 2017-05-29 PROCEDURE — 88341 IMHCHEM/IMCYTCHM EA ADD ANTB: CPT | Mod: 26,,, | Performed by: PATHOLOGY

## 2017-05-29 PROCEDURE — 88305 TISSUE EXAM BY PATHOLOGIST: CPT | Mod: 26,,, | Performed by: PATHOLOGY

## 2017-05-29 PROCEDURE — 71000015 HC POSTOP RECOV 1ST HR: Performed by: INTERNAL MEDICINE

## 2017-05-29 PROCEDURE — 92004 COMPRE OPH EXAM NEW PT 1/>: CPT | Mod: S$PBB,,, | Performed by: OPHTHALMOLOGY

## 2017-05-29 PROCEDURE — 88311 DECALCIFY TISSUE: CPT | Mod: 26,,, | Performed by: PATHOLOGY

## 2017-05-29 PROCEDURE — 63600175 PHARM REV CODE 636 W HCPCS: Performed by: NURSE ANESTHETIST, CERTIFIED REGISTERED

## 2017-05-29 PROCEDURE — 88341 IMHCHEM/IMCYTCHM EA ADD ANTB: CPT | Performed by: PATHOLOGY

## 2017-05-29 PROCEDURE — 99999 PR PBB SHADOW E&M-EST. PATIENT-LVL II: CPT | Mod: PBBFAC,,, | Performed by: OPHTHALMOLOGY

## 2017-05-29 PROCEDURE — 88305 TISSUE EXAM BY PATHOLOGIST: CPT | Mod: 59 | Performed by: PATHOLOGY

## 2017-05-29 PROCEDURE — 88342 IMHCHEM/IMCYTCHM 1ST ANTB: CPT | Mod: 26,,, | Performed by: PATHOLOGY

## 2017-05-29 PROCEDURE — 92134 CPTRZ OPH DX IMG PST SGM RTA: CPT | Mod: PBBFAC | Performed by: OPHTHALMOLOGY

## 2017-05-29 PROCEDURE — 99212 OFFICE O/P EST SF 10 MIN: CPT | Mod: PBBFAC,25 | Performed by: OPHTHALMOLOGY

## 2017-05-29 PROCEDURE — 88365 INSITU HYBRIDIZATION (FISH): CPT | Mod: 26,,, | Performed by: PATHOLOGY

## 2017-05-29 PROCEDURE — 25000003 PHARM REV CODE 250: Performed by: NURSE ANESTHETIST, CERTIFIED REGISTERED

## 2017-05-29 PROCEDURE — 76512 OPH US DX B-SCAN: CPT | Mod: 50,PBBFAC | Performed by: OPHTHALMOLOGY

## 2017-05-29 PROCEDURE — 38221 DX BONE MARROW BIOPSIES: CPT | Mod: S$PBB,LT,, | Performed by: NURSE PRACTITIONER

## 2017-05-29 PROCEDURE — 85097 BONE MARROW INTERPRETATION: CPT | Mod: ,,, | Performed by: PATHOLOGY

## 2017-05-29 PROCEDURE — 88184 FLOWCYTOMETRY/ TC 1 MARKER: CPT | Performed by: PATHOLOGY

## 2017-05-29 PROCEDURE — D9220A PRA ANESTHESIA: Mod: CRNA,,, | Performed by: NURSE ANESTHETIST, CERTIFIED REGISTERED

## 2017-05-29 PROCEDURE — 88364 INSITU HYBRIDIZATION (FISH): CPT | Mod: 26,,, | Performed by: PATHOLOGY

## 2017-05-29 PROCEDURE — 88313 SPECIAL STAINS GROUP 2: CPT

## 2017-05-29 PROCEDURE — 88189 FLOWCYTOMETRY/READ 16 & >: CPT | Mod: ,,, | Performed by: PATHOLOGY

## 2017-05-29 PROCEDURE — 37000009 HC ANESTHESIA EA ADD 15 MINS: Performed by: INTERNAL MEDICINE

## 2017-05-29 PROCEDURE — 71000044 HC DOSC ROUTINE RECOVERY FIRST HOUR: Performed by: INTERNAL MEDICINE

## 2017-05-29 PROCEDURE — G0364 BONE MARROW ASPIRATE &BIOPSY: HCPCS | Mod: S$PBB,LT,, | Performed by: NURSE PRACTITIONER

## 2017-05-29 PROCEDURE — 25000003 PHARM REV CODE 250: Performed by: INTERNAL MEDICINE

## 2017-05-29 RX ORDER — CYCLOSPORINE 0.5 MG/ML
1 EMULSION OPHTHALMIC
COMMUNITY
End: 2017-09-18 | Stop reason: SDUPTHER

## 2017-05-29 RX ORDER — ROSUVASTATIN CALCIUM 20 MG/1
20 TABLET, COATED ORAL
COMMUNITY

## 2017-05-29 RX ORDER — PROPOFOL 10 MG/ML
VIAL (ML) INTRAVENOUS
Status: DISCONTINUED | OUTPATIENT
Start: 2017-05-29 | End: 2017-05-29

## 2017-05-29 RX ORDER — LIDOCAINE HCL/PF 100 MG/5ML
SYRINGE (ML) INTRAVENOUS
Status: DISCONTINUED | OUTPATIENT
Start: 2017-05-29 | End: 2017-05-29

## 2017-05-29 RX ORDER — ACYCLOVIR 400 MG/1
TABLET ORAL
COMMUNITY
Start: 2017-05-08 | End: 2017-06-08 | Stop reason: SDUPTHER

## 2017-05-29 RX ORDER — LEVOTHYROXINE SODIUM 125 UG/1
TABLET ORAL
COMMUNITY
Start: 2016-07-12

## 2017-05-29 RX ORDER — LIDOCAINE HYDROCHLORIDE 10 MG/ML
1 INJECTION, SOLUTION EPIDURAL; INFILTRATION; INTRACAUDAL; PERINEURAL ONCE
Status: COMPLETED | OUTPATIENT
Start: 2017-05-29 | End: 2017-05-29

## 2017-05-29 RX ORDER — ALFUZOSIN HYDROCHLORIDE 10 MG/1
TABLET, EXTENDED RELEASE ORAL
COMMUNITY
Start: 2016-09-02

## 2017-05-29 RX ORDER — SODIUM CHLORIDE 9 MG/ML
INJECTION, SOLUTION INTRAVENOUS CONTINUOUS
Status: DISCONTINUED | OUTPATIENT
Start: 2017-05-29 | End: 2017-05-29 | Stop reason: HOSPADM

## 2017-05-29 RX ORDER — DOXYCYCLINE HYCLATE 100 MG
TABLET ORAL
COMMUNITY
Start: 2017-05-08 | End: 2017-05-29

## 2017-05-29 RX ORDER — PROPOFOL 10 MG/ML
VIAL (ML) INTRAVENOUS CONTINUOUS PRN
Status: DISCONTINUED | OUTPATIENT
Start: 2017-05-29 | End: 2017-05-29

## 2017-05-29 RX ORDER — CARVEDILOL 6.25 MG/1
TABLET ORAL
COMMUNITY
Start: 2016-09-25

## 2017-05-29 RX ORDER — NAPROXEN 500 MG/1
TABLET ORAL
COMMUNITY
Start: 2016-09-19

## 2017-05-29 RX ORDER — OMEPRAZOLE 40 MG/1
CAPSULE, DELAYED RELEASE ORAL
COMMUNITY
Start: 2016-07-12

## 2017-05-29 RX ADMIN — PROPOFOL 150 MCG/KG/MIN: 10 INJECTION, EMULSION INTRAVENOUS at 12:05

## 2017-05-29 RX ADMIN — SODIUM CHLORIDE: 0.9 INJECTION, SOLUTION INTRAVENOUS at 10:05

## 2017-05-29 RX ADMIN — LIDOCAINE HYDROCHLORIDE 40 MG: 20 INJECTION, SOLUTION INTRAVENOUS at 12:05

## 2017-05-29 RX ADMIN — LIDOCAINE HYDROCHLORIDE 10 MG: 10 INJECTION, SOLUTION EPIDURAL; INFILTRATION; INTRACAUDAL; PERINEURAL at 10:05

## 2017-05-29 RX ADMIN — PROPOFOL 50 MG: 10 INJECTION, EMULSION INTRAVENOUS at 12:05

## 2017-05-29 NOTE — DISCHARGE INSTRUCTIONS
Discharge instructions for having a Bone Marrow Aspiration / Biopsy    Keep Bandage in place for 24 hours.  - Do not shower or take a tube bath during this time. (you may sponge bathe).  - Call the nurse or physician for excessive bleeding or pain at biopsy site.  - You may take Tylenol as needed for pain.    You have received medication to sedate you.  - Do not drive a car or operate heavy machinery for the rest of the day.  - You may resume other activities as tolerated.    You can call [phone] for any problems during the hours of 8:00 AM-5:00PM.    For an emergency after 5:00 PM you can call [phone] and have the  page the Hematologist / Oncologist on call.

## 2017-05-29 NOTE — INTERVAL H&P NOTE
The patient has been examined and the H&P has been reviewed:    I concur with the findings and no changes have occurred since H&P was written.    Anesthesia/Surgery risks, benefits and alternative options discussed and understood by patient/family.          Active Hospital Problems    Diagnosis  POA    Multiple myeloma [C90.00]  Yes      Resolved Hospital Problems    Diagnosis Date Resolved POA   No resolved problems to display.

## 2017-05-29 NOTE — PROGRESS NOTES
HPI     Eye Problem    Additional comments: ref. by Dr Park multiple myeloma           Comments   od burns all the time. Using restasis refresh, lumigan ou  Had cataract 9/5/16, went back for f/u right eye was buldging. Went to the   hospital was told he had a tumor. Treated with radiation. Started chemo in   11/2016         Last edited by Kayla Heard on 5/29/2017  8:27 AM. (History)            Assessment /Plan     For exam results, see Encounter Report.    Multiple myeloma in remission  -     B Scan  -     OCT- Retina    Plasmacytoma of bone  -     B Scan  -     OCT- Retina    ERM OD (epiretinal membrane, right eye)  -     OCT- Retina    Other orders  -     Discontinue: lifitegrast 5 % Dpet; Place 1 drop into the right eye 2 (two) times daily.  Dispense: 5 each; Refill: 11  -     lifitegrast 5 % Dpet; Place 1 drop into the right eye 2 (two) times daily.  Dispense: 5 each; Refill: 11      Irrigation: Procedure note:   OD: patent right lower lid punctum with significant pressure upon irrigation with 90% regurgitation back through punctum, 10% flow to nose  Patent right upper eyelid punctum with zero resistance to irrigation with 100% flow to nose      1. Plasmacytoma OD // Mult myeloma      2. Ectropion, RLL   - S/p VRd and XRx w/ Heme-Onc   B-scan OD: retina flat, vitreous clear, no retrobulbar masses  - Pt has some dryness now w/ visual change, states that his glasses Rx is changing  - No radiation retinopathy noted on exam today  - ectropion gives illusion of exophthalmos OD, but Hertels are symmetric  - P&I done today OD only, only partial lower canalicular stenosis, rest of canalicular system wide open  - Start Xiidra BID  - Cont Restasis BID, ATs QID  - Will schedule surgery for ectropion repair OD in [facility]. Will need medical clearance  Informed consent obtained after extensive risks/benefits/alternatives were discussed with the patient including but not limited to pain, bleeding, infection,  "ocular injury, loss of the eye, asymmetry, need for revision in future, scarring.  Alternatives such as waiting were discussed.  All questions were answered.      3. s/p ERM removal  - pt states Dr. Ezequiel Alonso in [place] removed some "scar tissue from my retina"  - OCT retina done today shows trace remaining ERM, but looks good  - D/w retina, no intervention necessary at this time  - Monitor      I have reviewed and concur with the resident's history, physical, assessment, and plan.  I have personally interviewed and examined the patient.                       "

## 2017-05-29 NOTE — ANESTHESIA PREPROCEDURE EVALUATION
05/29/2017  Tahir Wynne is a 77 y.o., male.    Anesthesia Evaluation    I have reviewed the Patient Summary Reports.     I have reviewed the Medications.     Review of Systems  Anesthesia Hx:  History of prior surgery of interest to airway management or planning:  Denies Personal Hx of Anesthesia complications.   Social:  Non-Smoker, No Alcohol Use    Hematology/Oncology:  Hematology Normal      Current/Recent Cancer. (multiple myeloma, remission)   EENT/Dental:EENT/Dental Normal   Cardiovascular:   Exercise tolerance: good Hypertension, well controlled    Pulmonary:  Pulmonary Normal    Renal/:  Renal/ Normal     Hepatic/GI:  Hepatic/GI Normal    Musculoskeletal:  Musculoskeletal Normal    Neurological:  Neurology Normal    Endocrine:   Hypothyroidism    Dermatological:  Skin Normal    Psych:  Psychiatric Normal           Physical Exam  General:  Well nourished    Airway/Jaw/Neck:  Airway Findings: Mouth Opening: Normal Tongue: Normal  General Airway Assessment: Adult, Good  Mallampati: III  TM Distance: Normal, at least 6 cm     Eyes/Ears/Nose:  EYES/EARS/NOSE FINDINGS: Normal   Dental:  Dental Findings: In tact   Chest/Lungs:  Chest/Lungs Findings: Clear to auscultation, Normal Respiratory Rate     Heart/Vascular:  Heart Findings: Rate: Normal  Rhythm: Regular Rhythm  Sounds: Normal  Heart murmur: negative       Mental Status:  Mental Status Findings:  Cooperative, Alert and Oriented         Anesthesia Plan  Type of Anesthesia, risks & benefits discussed:  Anesthesia Type:  general  Patient's Preference:   Intra-op Monitoring Plan:   Intra-op Monitoring Plan Comments:   Post Op Pain Control Plan:   Post Op Pain Control Plan Comments:   Induction:   IV  Beta Blocker:  Patient is not currently on a Beta-Blocker (No further documentation required).       Informed Consent: Patient understands  risks and agrees with Anesthesia plan.  Questions answered. Anesthesia consent signed with patient.  ASA Score: 2     Day of Surgery Review of History & Physical:    H&P update referred to the provider.     Anesthesia Plan Notes:   77M HTN, multiple myeloma for restaging BMB under [place] NC TIVA        Ready For Surgery From Anesthesia Perspective.

## 2017-05-29 NOTE — H&P (VIEW-ONLY)
SECTION OF HEMATOLOGY AND BONE MARROW TRANSPLANT  Return Patient Visit   05/23/2017  Referred by:  No ref. provider found  Referred for:  MM, autoSCT    CHIEF COMPLAINT:   Chief Complaint   Patient presents with    Multiple Myeloma       HISTORY OF PRESENT ILLNESS:   77 yo male with pmh as below, all well controlled on current medications; diagnosis of systemic myeloma, cytogenetic studies normal.  Initially presentedfor cataract surgery.  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  Patient subsequently completed 23 planned fractions of XRT + dex with rad onc in [place].     Both have resulted in improvement in proptosis and vision.  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  PET scan showed known orbit lesion as well as multiple other sites of  Bone disease.    Since our last appt he is overall doing well.  He is currently receiving cycle #9 of VRd and tolerating with no significant side effects.  He continues to have blurry vision in the right eye. MRI of orbits done 4/17/17 showed persistent changes likely representing scar tissue from treated plasmacytoma.  He has fu appt with opthalmology here to evaluation for possible intervention that may restore sight.  Comes to clinic with his daughter and wife.     PAST MEDICAL HISTORY:   Past Medical History:   Diagnosis Date    Allergy     Anxiety     Cataract     Glaucoma     Hypertension     denies htn states takes coreg for rhythm    Prostate cancer     Thyroid disease        PAST SURGICAL HISTORY:   Past Surgical History:   Procedure Laterality Date    BRAIN SURGERY  2010    optic nerve tumor    broken leg Right     CARPAL TUNNEL RELEASE      EYE SURGERY      SHOULDER SURGERY         PAST SOCIAL HISTORY:   reports that he has quit smoking. He does not have any smokeless tobacco history on file. He reports  that he drinks about 4.2 oz of alcohol per week . He reports that he does not use drugs.    FAMILY HISTORY:  Family History   Problem Relation Age of Onset    Cancer Mother     Prostate cancer Father     Coronary artery disease Father     Dementia Sister     Stroke Brother     Dementia Brother     No Known Problems Brother     Dementia Brother     No Known Problems Brother     Pancreatitis Brother     Cancer Brother     No Known Problems Sister     No Known Problems Sister        CURRENT MEDICATIONS:   Current Outpatient Prescriptions   Medication Sig    alfuzosin (UROXATRAL) 10 mg Tb24     aspirin (ECOTRIN) 81 MG EC tablet Take 81 mg by mouth once daily.    carvedilol (COREG) 6.25 MG tablet     dexamethasone (DECADRON) 4 MG Tab     hydrocortisone 1 % cream     ketoconazole (NIZORAL) 2 % shampoo     latanoprost 0.005 % ophthalmic solution     levothyroxine (SYNTHROID) 125 MCG tablet     multivitamin with minerals tablet Take 1 tablet by mouth once daily.    naproxen (NAPROSYN) 500 MG tablet     neomycin-polymyxin-hydrocortisone (CORTISPORIN) otic solution     omeprazole (PRILOSEC) 40 MG capsule     RESTASIS 0.05 % ophthalmic emulsion     REVLIMID 25 mg Cap     rosuvastatin (CRESTOR) 20 MG tablet Take 20 mg by mouth.     No current facility-administered medications for this visit.      ALLERGIES:   Review of patient's allergies indicates:   Allergen Reactions    Decongest tabs      All over the counter medications containing decongestive.              REVIEW OF SYSTEMS:   General ROS: negative  Psychological ROS: negative  Ophthalmic ROS: see HPI  ENT ROS: + right eye blurry vision   Allergy and Immunology ROS: negative  Hematological and Lymphatic ROS: negative  Endocrine ROS: negative  Respiratory ROS: negative  Cardiovascular ROS: negative  Gastrointestinal ROS: negative  Genito-Urinary ROS: negative  Musculoskeletal ROS: negative  Neurological ROS: negative  Dermatological ROS:  negative    PHYSICAL EXAM:   Vitals:    05/22/17 1518   BP: (!) 155/71   Pulse: 69   Temp: 97.5 °F (36.4 °C)       General - well developed, well nourished, no apparent distress  Head & Face - no sinus tenderness  Eyes - normal conjunctivae and lids   ENT - normal external auditory canals and tympanic membranes bilaterally oropharynx clear,  Normal dentition and gums  Neck - normal thyroid  Chest and Lung - normal respiratory effort, clear to auscultation bilaterally   Cardiovascular - RRR with no MGR, normal S1 and S2; no pedal edema  Abdomen -  soft, nontender, no palpable hepatomegaly or splenomegaly  Lymph - no palpable lymphadenopathy  Extremities - unremarkable nails and digits  Heme - no bruising, petechiae, pallor  Skin - no rashes or lesions  Psych - appropriate mood and affect      ECOG Performance Status: (foot note - ECOG PS provided by Eastern Cooperative Oncology Group) 1 - Symptomatic but completely ambulatory    Karnofsky Performance Score:  90%- Able to Carry on Normal Activity: Minor Symptoms of Disease  DATA: .  Lab Results   Component Value Date    WBC 2.89 (L) 05/22/2017    HGB 12.3 (L) 05/22/2017    HCT 37.9 (L) 05/22/2017    MCV 92 05/22/2017    PLT 85 (L) 05/22/2017     Gran #   Date Value Ref Range Status   05/22/2017 2.4 1.8 - 7.7 K/uL Final     Gran%   Date Value Ref Range Status   05/22/2017 82.1 (H) 38.0 - 73.0 % Final     Lymph #   Date Value Ref Range Status   05/22/2017 0.4 (L) 1.0 - 4.8 K/uL Final     Lymph%   Date Value Ref Range Status   05/22/2017 13.8 (L) 18.0 - 48.0 % Final     CMP  Sodium   Date Value Ref Range Status   05/22/2017 137 136 - 145 mmol/L Final     Potassium   Date Value Ref Range Status   05/22/2017 4.6 3.5 - 5.1 mmol/L Final     Chloride   Date Value Ref Range Status   05/22/2017 107 95 - 110 mmol/L Final     CO2   Date Value Ref Range Status   05/22/2017 22 (L) 23 - 29 mmol/L Final     Glucose   Date Value Ref Range Status   05/22/2017 214 (H) 70 - 110 mg/dL  Final     BUN, Bld   Date Value Ref Range Status   05/22/2017 28 (H) 8 - 23 mg/dL Final     Creatinine   Date Value Ref Range Status   05/22/2017 1.4 0.5 - 1.4 mg/dL Final     Calcium   Date Value Ref Range Status   05/22/2017 8.8 8.7 - 10.5 mg/dL Final     Total Protein   Date Value Ref Range Status   05/22/2017 6.2 6.0 - 8.4 g/dL Final     Albumin   Date Value Ref Range Status   05/22/2017 3.5 3.5 - 5.2 g/dL Final     Total Bilirubin   Date Value Ref Range Status   05/22/2017 0.4 0.1 - 1.0 mg/dL Final     Comment:     For infants and newborns, interpretation of results should be based  on gestational age, weight and in agreement with clinical  observations.  Premature Infant recommended reference ranges:  Up to 24 hours.............<8.0 mg/dL  Up to 48 hours............<12.0 mg/dL  3-5 days..................<15.0 mg/dL  6-29 days.................<15.0 mg/dL       Alkaline Phosphatase   Date Value Ref Range Status   05/22/2017 51 (L) 55 - 135 U/L Final     AST   Date Value Ref Range Status   05/22/2017 15 10 - 40 U/L Final     ALT   Date Value Ref Range Status   05/22/2017 23 10 - 44 U/L Final     Anion Gap   Date Value Ref Range Status   05/22/2017 8 8 - 16 mmol/L Final     eGFR if    Date Value Ref Range Status   05/22/2017 55.6 (A) >60 mL/min/1.73 m^2 Final     eGFR if non    Date Value Ref Range Status   05/22/2017 48.1 (A) >60 mL/min/1.73 m^2 Final     Comment:     Calculation used to obtain the estimated glomerular filtration  rate (eGFR) is the CKD-EPI equation. Since race is unknown   in our information system, the eGFR values for   -American and Non--American patients are given   for each creatinine result.       IgG - Serum   Date Value Ref Range Status   05/22/2017 523 (L) 650 - 1600 mg/dL Final     Comment:     IgG Cord Blood Reference Range: 650-1600 mg/dL.     IgA   Date Value Ref Range Status   05/22/2017 67 40 - 350 mg/dL Final     Comment:     IgA Cord  Blood Reference Range: <5 mg/dL.     IgM   Date Value Ref Range Status   05/22/2017 34 (L) 50 - 300 mg/dL Final     Comment:     IgM Cord Blood Reference Range: <25 mg/dL.     Kappa Free Light Chains   Date Value Ref Range Status   05/22/2017 1.59 0.33 - 1.94 mg/dL Final   01/06/2017 1.96 (H) 0.33 - 1.94 mg/dL Final   10/13/2016 1.02 0.33 - 1.94 mg/dL Final     Lambda Free Light Chains   Date Value Ref Range Status   05/22/2017 1.80 0.57 - 2.63 mg/dL Final   01/06/2017 4.29 (H) 0.57 - 2.63 mg/dL Final   10/13/2016 38.98 (H) 0.57 - 2.63 mg/dL Final     Kappa/Lambda FLC Ratio   Date Value Ref Range Status   05/22/2017 0.88 0.26 - 1.65 Final   01/06/2017 0.46 0.26 - 1.65 Final   10/13/2016 0.03 (L) 0.26 - 1.65 Final      MRI orbit - 4/171/17  Narrative     Comparison CT 2016 and MRI 03/02/2017 studies.  MRI brain face and orbits without and with gadolinium 7 cc intravenous.    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      No new intraosseous or intracranial lesions.  The vertebral, basilar common internal carotid arteries and major branches remain patent.  Few tiny foci  hyperattenuation posterior frontal high convexity subcortical deep white matter unchanged.  Central and cortical atrophy appropriate for age.    Mucous retention cyst right maxillary sinus, post cataract surgery on right stable.  Mucosal hypertrophy of the ethmoid and frontal sinuses unchanged.   Impression      1. Right sphenoid wing intraosseous lesion attributed to plasmacytoma is stable, allowing for differences in technique and positioning.    2.  Brain stable with few tiny areas of focal remote gliosis.       PLEASE SEE EPIC MEDIA TAB FOR ALL IMAGING, PATHOLOGY REPORTS FROM OSH  ASSESSMENT AND PLAN:   Encounter Diagnoses   Name Primary?   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 now symptomatically improved  -he is now s/p 8 cycles of VRd and currently receiving cycle #9; biochemical studies and bone marrow biopsy from march 2017 show a near CR to therapy thus far  -suspect given systemic reponse and XRT to this area that the MRI finding represent scar tissue; he will follow up with opthalmology her next week for fu for possible intervention   -may need PET scan to further elucidate lesion and ensure not active plasmacytoma pending opthalmology evaluation  -recommend restaging bone marrow biopsy next week; today's biochemical studies are pending   -if VGPR or better confirmed then would recommend transitioning to revlimid maintenance  -continue Velcade/Dex locally with Dr. Madrid until bone marrow biopsy    supportive acyclovir, asa, bisphosphanate with   -to have colonscopy to have cecal abnormality; will inquire if he had this at next appt     Follow Up: cbc, cmp, serum free light chains, quantitative immunoglobulins, serum electropheresis, serum immunofixation and MD appt in 4 weeks     Bernardo Park MD  Hematology/Oncology/Bone Marrow Transplant

## 2017-05-29 NOTE — LETTER
June 1, 2017      Warren Park MD  [address]  [facility] [address]           [facility]  [address]  [facility] [address]  Phone: [phone]  Fax: [phone]          Patient: Tahir Wynne   MR Number: [id number]   YOB: 1940   Date of Visit: 5/29/2017       Dear Dr. Warren Park:    Thank you for referring Tahir Wynne to me for evaluation. Attached you will find relevant portions of my assessment and plan of care.    If you have questions, please do not hesitate to call me. I look forward to following Tahir Wynne along with you.    Sincerely,        Enclosure  CC:  No Recipients    If you would like to receive this communication electronically, please contact [email or web address] or [phone] to request more information on [employer] access.    For providers and/or their staff who would like to refer a patient to [facility], please contact us through our one-stop-shop provider referral line, [facility], at [phone].    If you feel you have received this communication in error or would no longer like to receive these types of communications, please e-mail [email or web address]

## 2017-05-29 NOTE — DISCHARGE SUMMARY
[facility]  Hematology/Oncology  Discharge Summary      Patient Name: Tahir Wynne  MRN: [id number]  Admission Date: 5/29/2017  Hospital Length of Stay: 0 days  Discharge Date and Time: 5/29/2017  1:12 PM  Attending Physician: Warren Park MD   Discharging Provider: Emily Parker NP  Primary Care Provider: Florian Madrid MD    HPI: Restaging Multiple Myeloma    Procedure(s) (LRB):  BIOPSY-BONE MARROW (Left)     Hospital Course: Patient admitted to pre op today for a bone marrow aspiration and biopsy. Consent was done for a bone marrow biopsy. Patient was sedated per anesthesia and a bone marrow biopsy and aspiration was performed in the OR (see Op note). Patient was then transferred to post op and discharged home when appropriate per anesthesia.     Pending Diagnostic Studies:     Procedure Component Value Units Date/Time    Bone Marrow Prep and Stain [[id number]] Collected:  05/29/17 1027    Order Status:  Sent Lab Status:  In process Updated:  05/29/17 1028    Specimen:  Bone Marrow from Bone Marrow     Iron Stain, Bone Marrow [[id number]] Collected:  05/29/17 1027    Order Status:  Sent Lab Status:  In process Updated:  05/29/17 1028    Specimen:  Bone Marrow from Bone Marrow     Leukemia/Lymphoma Screen - Bone Marrow Left Posterior Iliac Crest [[id number]] Collected:  05/29/17 1027    Order Status:  Sent Lab Status:  In process Updated:  05/29/17 1028    Specimen:  Bone Marrow         Final Active Diagnoses:    Diagnosis Date Noted POA    Multiple myeloma [C90.00] 03/02/2017 Yes      Problems Resolved During this Admission:    Diagnosis Date Noted Date Resolved POA      Discharged Condition: good    Disposition: Home    Follow Up: Follow-up with Dr. Park as scheduled     Patient Instructions:   No discharge procedures on file.  Medications:  Reconciled Home Medications:   Current Discharge Medication List      CONTINUE these medications which have NOT CHANGED     Details   !! alfuzosin (UROXATRAL) 10 mg Tb24       !! carvedilol (COREG) 6.25 MG tablet       !! carvedilol (COREG) 6.25 MG tablet       !! cycloSPORINE (RESTASIS) 0.05 % ophthalmic emulsion 1 drop.      dexamethasone (DECADRON) 4 MG Tab       ketoconazole (NIZORAL) 2 % shampoo       latanoprost 0.005 % ophthalmic solution       !! levothyroxine (SYNTHROID) 125 MCG tablet       multivitamin with minerals tablet Take 1 tablet by mouth once daily.      !! naproxen (NAPROSYN) 500 MG tablet       !! omeprazole (PRILOSEC) 40 MG capsule       !! rosuvastatin (CRESTOR) 20 MG tablet Take 20 mg by mouth.      acyclovir (ZOVIRAX) 400 MG tablet       !! alfuzosin (UROXATRAL) 10 mg Tb24       aspirin (ECOTRIN) 81 MG EC tablet Take 81 mg by mouth once daily.      hydrocortisone 1 % cream       !! levothyroxine (SYNTHROID) 125 MCG tablet       lifitegrast 5 % Dpet Place 1 drop into the right eye 2 (two) times daily.  Qty: 5 each, Refills: 11      !! naproxen (NAPROSYN) 500 MG tablet       neomycin-polymyxin-hydrocortisone (CORTISPORIN) otic solution       !! omeprazole (PRILOSEC) 40 MG capsule       !! RESTASIS 0.05 % ophthalmic emulsion       REVLIMID 25 mg Cap       !! rosuvastatin (CRESTOR) 20 MG tablet Take 20 mg by mouth.       !! - Potential duplicate medications found. Please discuss with provider.      STOP taking these medications       doxycycline (VIBRA-TABS) 100 MG tablet Comments:   Reason for Stopping:               Emily Parker NP  Hematology/Oncology  [facility]

## 2017-05-29 NOTE — ANESTHESIA POSTPROCEDURE EVALUATION
"Anesthesia Post Evaluation    Patient: Tahir Wynne    Procedure(s) Performed: Procedure(s) (LRB):  BIOPSY-BONE MARROW (Left)    Final Anesthesia Type: MAC  Patient location during evaluation: [facility]  Patient participation: Yes- Able to Participate  Level of consciousness: awake and alert  Post-procedure vital signs: reviewed and stable  Pain management: adequate  Airway patency: patent  PONV status at discharge: No PONV  Anesthetic complications: no      Cardiovascular status: blood pressure returned to baseline  Respiratory status: unassisted  Hydration status: euvolemic  Follow-up not needed.        Visit Vitals  BP (!) 161/74 (BP Location: Right arm, Patient Position: Lying, BP Method: Automatic)   Pulse 62   Temp 36.6 °C (97.9 °F) (Oral)   Resp 18   Ht 5' 10" (1.778 m)   Wt 79.4 kg (175 lb)   SpO2 100%   BMI 25.11 kg/m²       Pain/Amaury Score: Pain Assessment Performed: Yes (5/29/2017 12:30 PM)  Presence of Pain: denies (5/29/2017 12:30 PM)  Amaury Score: 10 (5/29/2017 12:30 PM)      "

## 2017-05-29 NOTE — PROGRESS NOTES
"Pt reported eye doctor appointment this morning and "the doctor dilated my eye". On assessment, pt continuously blinking and closing right eye. No drainage or swelling noted.  "

## 2017-05-29 NOTE — TRANSFER OF CARE
"Anesthesia Transfer of Care Note    Patient: Tahir Wynne    Procedure(s) Performed: Procedure(s) (LRB):  BIOPSY-BONE MARROW (Left)    Patient location: PACU    Anesthesia Type: general    Transport from OR: Transported from OR on room air with adequate spontaneous ventilation    Post pain: adequate analgesia    Post assessment: no apparent anesthetic complications    Post vital signs: stable    Level of consciousness: awake    Nausea/Vomiting: no nausea/vomiting    Complications: none    Transfer of care protocol was followed      Last vitals:   Visit Vitals  BP (!) 160/75 (BP Location: Left arm, Patient Position: Lying, BP Method: Automatic)   Pulse 62   Temp 36.6 °C (97.8 °F) (Oral)   Resp 16   Ht 5' 10" (1.778 m)   Wt 79.4 kg (175 lb)   SpO2 98%   BMI 25.11 kg/m²     "

## 2017-05-29 NOTE — ANESTHESIA RELEASE NOTE
"Anesthesia Release from PACU Note    Patient: Tahir Wynne    Procedure(s) Performed: Procedure(s) (LRB):  BIOPSY-BONE MARROW (Left)    Anesthesia type: MAC    Post pain: Adequate analgesia    Post assessment: no apparent anesthetic complications, tolerated procedure well and no evidence of recall    Last Vitals:   Visit Vitals  BP (!) 161/74 (BP Location: Right arm, Patient Position: Lying, BP Method: Automatic)   Pulse 62   Temp 36.6 °C (97.9 °F) (Oral)   Resp 18   Ht 5' 10" (1.778 m)   Wt 79.4 kg (175 lb)   SpO2 100%   BMI 25.11 kg/m²       Post vital signs: stable    Level of consciousness: awake, alert  and oriented    Nausea/Vomiting: no nausea/no vomiting    Complications: none    Airway Patency: patent    Respiratory: unassisted    Cardiovascular: stable and blood pressure at baseline    Hydration: euvolemic  "

## 2017-05-29 NOTE — BRIEF OP NOTE
PROCEDURE NOTE:  Date of procedure: 5/29/2017  Bone Marrow aspiration and biopsy  Indication: Restaging Multiple Myeloma  Consent: Informed consent was obtained from patient.  Timeout: Done and documented.  Position: Right lateral  Site: Left posterior illiac crest.  Prep: Betadine.  Needle used: 11 gauge Jamshidi needle.  Anesthetic: 1% lidocaine 5 cc.  Biopsy: The biopsy needle was introduced into the marrow cavity and an aspirate was obtained without complications and sent for flow and cytogenetics. Core biopsy obtained without difficulty and sent for routine histologic examination.  Complications: None.  EBL: minimal  Disposition: The patient was discharged home per anaesthesia protocol.    Emily Parker NP  Hematology/BMT

## 2017-05-29 NOTE — PROGRESS NOTES
Pt has upper and lower partial in place; okay to keep in per Dr. Grossman at bedside. Pt also has bilateral hearing aids in place; okay to keep in per Dr. Grossman.

## 2017-05-30 LAB
BODY SITE - BONE MARROW: NORMAL
BONE MARROW IRON STAIN COMMENT: NORMAL
CLINICAL DIAGNOSIS - BONE MARROW: NORMAL
FLOW CYTOMETRY ANTIBODIES ANALYZED - BONE MARROW: NORMAL
FLOW CYTOMETRY COMMENT - BONE MARROW: NORMAL
FLOW CYTOMETRY INTERPRETATION - BONE MARROW: NORMAL

## 2017-06-01 ENCOUNTER — TELEPHONE (OUTPATIENT)
Dept: HEMATOLOGY/ONCOLOGY | Facility: CLINIC | Age: 77
End: 2017-06-01

## 2017-06-01 DIAGNOSIS — C90.01 MULTIPLE MYELOMA IN REMISSION: Primary | ICD-10-CM

## 2017-06-01 RX ORDER — LENALIDOMIDE 10 MG/1
10 CAPSULE ORAL DAILY
Qty: 30 EACH | Refills: 0 | Status: SHIPPED | OUTPATIENT
Start: 2017-06-01 | End: 2017-07-05 | Stop reason: SDUPTHER

## 2017-06-01 NOTE — TELEPHONE ENCOUNTER
----- Message from Christiano Cyr sent at 6/1/2017  8:18 AM CDT -----  Contact: pt daughter (Ratna Calabrese)  pt daughter (Ratna Calabrese) is calling to get pt results from bone marrow.  Contact number [phone]

## 2017-06-02 ENCOUNTER — TELEPHONE (OUTPATIENT)
Dept: OPHTHALMOLOGY | Facility: CLINIC | Age: 77
End: 2017-06-02

## 2017-06-02 DIAGNOSIS — H02.102 ECTROPION OF RIGHT LOWER EYELID, UNSPECIFIED ECTROPION TYPE: Primary | ICD-10-CM

## 2017-06-06 ENCOUNTER — TELEPHONE (OUTPATIENT)
Dept: HEMATOLOGY/ONCOLOGY | Facility: CLINIC | Age: 77
End: 2017-06-06

## 2017-06-06 NOTE — TELEPHONE ENCOUNTER
----- Message from Christiano Cyr sent at 6/6/2017  4:02 PM CDT -----  Contact: pt daughter (Barbara)   Pt daughter (Barbara) is calling to speak with nurse in regards to getting pt Revlimid medication.  Contact number [phone]   Called Barbara. No answered. Left message to call me back.

## 2017-06-07 ENCOUNTER — TELEPHONE (OUTPATIENT)
Dept: HEMATOLOGY/ONCOLOGY | Facility: CLINIC | Age: 77
End: 2017-06-07

## 2017-06-07 NOTE — TELEPHONE ENCOUNTER
----- Message from Prince Cantu sent at 6/7/2017  2:24 PM CDT -----  Contact: Barbara (Daughter)  Patient's daughter returning missed phone call regarding her dad's medication. Daughter states if she cannot be reached, call the [facility] and speak with Suzanne regarding her father's medication.    Barbara (daughter)   [phone]    Suzanne ( [facility])  [phone]

## 2017-06-07 NOTE — TELEPHONE ENCOUNTER
----- Message from Ericka Vargas sent at 6/7/2017  8:49 AM CDT -----  Contact: [facility]   Calling in regards to Revlimid script qualification.   Please call Suzanne [phone]

## 2017-06-07 NOTE — TELEPHONE ENCOUNTER
----- Message from Ericka Vargas sent at 6/7/2017 10:30 AM CDT -----  Contact: [facility]   Needs prior auth for lenalidomide (REVLIMID) 10 mg Cap. They have been calling all week and have yet to hear anything back.  Please call [phone]

## 2017-06-07 NOTE — TELEPHONE ENCOUNTER
----- Message from Ericka Vargas sent at 6/7/2017  2:55 PM CDT -----  Contact: Daughter  Barbara is calling Tyra chang. Please nova [phone]

## 2017-06-08 ENCOUNTER — TELEPHONE (OUTPATIENT)
Dept: HEMATOLOGY/ONCOLOGY | Facility: CLINIC | Age: 77
End: 2017-06-08

## 2017-06-08 DIAGNOSIS — C90.00 MULTIPLE MYELOMA NOT HAVING ACHIEVED REMISSION: Primary | ICD-10-CM

## 2017-06-08 RX ORDER — ACYCLOVIR 400 MG/1
400 TABLET ORAL 2 TIMES DAILY
Qty: 60 TABLET | Refills: 1 | Status: SHIPPED | OUTPATIENT
Start: 2017-06-08 | End: 2019-05-31

## 2017-06-08 NOTE — TELEPHONE ENCOUNTER
----- Message from Renuka Milton RN sent at 6/7/2017  9:15 AM CDT -----  Contact: pt daughter (mignon)      ----- Message -----  From: Warren Park MD  Sent: 6/7/2017   9:05 AM  To: Renuka Milton RN    Should continue acyclovir for 2 months from his last dose of velcade.  Continue aspirin. No dexamethasone.    ----- Message -----  From: Renuka Milton RN  Sent: 6/6/2017   4:01 PM  To: Warren Park MD     Patient daughter said that her Dad is only taking ASA. She is waiting for Revlimid. She wants to know if her Dad needs Dexamethasone and Zovirax?    ----- Message -----  From: Christiano Cyr  Sent: 6/6/2017   8:08 AM  To: Xavier LANDRY Staff    Pt daughter is calling to see when pt will receive Revlimid medication, daughter states she has spoken with pharmacy and she was inform a few request were sent over but no response from doctor office.  Contact number [phone]

## 2017-06-08 NOTE — TELEPHONE ENCOUNTER
----- Message from Shanon Escobar sent at 6/8/2017 12:57 PM CDT -----  Contact: Daughter- Barbara  Refill REVLIMID 25 mg Cap    States the nurse has the information to the pharmacy. She would like a call today.     Please call: [phone]

## 2017-06-08 NOTE — TELEPHONE ENCOUNTER
----- Message from Ericka Vargas sent at 6/8/2017  9:38 AM CDT -----  Contact: [facility]   They are faxing over prescription for lenalidomide (REVLIMID) 10 mg Cap. The pt has been without since April.

## 2017-06-08 NOTE — TELEPHONE ENCOUNTER
Contacted [employer] to get authorization.  Faxed prescription to pharmacy at [phone].  Returned call to daughter, Barbara, to update on status of Revlimid. She should be hearing from the pharmacy to set up delivery.

## 2017-06-08 NOTE — TELEPHONE ENCOUNTER
Called to inform patient that his refill request has been approved and sent to [facility] pharmacy.

## 2017-06-12 ENCOUNTER — TELEPHONE (OUTPATIENT)
Dept: HEMATOLOGY/ONCOLOGY | Facility: CLINIC | Age: 77
End: 2017-06-12

## 2017-06-12 NOTE — TELEPHONE ENCOUNTER
----- Message from Lisette Turcios sent at 6/12/2017  8:31 AM CDT -----  Patient's wife would like the nurse to give her a call back. Says the patient was suppose to be scheduled in July. She can be reached at [phone].

## 2017-06-12 NOTE — TELEPHONE ENCOUNTER
Patient's wife states that she thought that his appointment for this coming Friday was going to be cancelled and rescheduled for July. She said you spoke with them over the phone and said he should return in July. He will start Revlimid today.     When do you want the patient to come in for a follow up visit with labs?

## 2017-06-12 NOTE — TELEPHONE ENCOUNTER
Called patient's wife back to let them know that he does not have to come back for a follow up visit for 4 weeks, per Dr Park.

## 2017-06-28 ENCOUNTER — TELEPHONE (OUTPATIENT)
Dept: OPTOMETRY | Facility: CLINIC | Age: 77
End: 2017-06-28

## 2017-06-29 PROBLEM — H02.132 SENILE ECTROPION OF RIGHT LOWER EYELID: Status: ACTIVE | Noted: 2017-06-29

## 2017-06-30 ENCOUNTER — SURGERY (OUTPATIENT)
Age: 77
End: 2017-06-30

## 2017-06-30 ENCOUNTER — ANESTHESIA (OUTPATIENT)
Dept: SURGERY | Facility: HOSPITAL | Age: 77
End: 2017-06-30
Payer: MEDICARE

## 2017-06-30 ENCOUNTER — ANESTHESIA EVENT (OUTPATIENT)
Dept: SURGERY | Facility: HOSPITAL | Age: 77
End: 2017-06-30
Payer: MEDICARE

## 2017-06-30 ENCOUNTER — HOSPITAL ENCOUNTER (OUTPATIENT)
Facility: HOSPITAL | Age: 77
Discharge: HOME | End: 2017-06-30
Attending: OPHTHALMOLOGY | Admitting: OPHTHALMOLOGY
Payer: MEDICARE

## 2017-06-30 VITALS
HEART RATE: 71 BPM | SYSTOLIC BLOOD PRESSURE: 132 MMHG | HEIGHT: 70 IN | RESPIRATION RATE: 16 BRPM | TEMPERATURE: 98 F | DIASTOLIC BLOOD PRESSURE: 73 MMHG | WEIGHT: 175 LBS | OXYGEN SATURATION: 98 % | BODY MASS INDEX: 25.05 KG/M2

## 2017-06-30 DIAGNOSIS — H02.103 ECTROPION DUE TO LAXITY OF EYELID, RIGHT: ICD-10-CM

## 2017-06-30 DIAGNOSIS — H02.132 SENILE ECTROPION OF RIGHT LOWER EYELID: Primary | ICD-10-CM

## 2017-06-30 DIAGNOSIS — H02.109 ECTROPION DUE TO LAXITY OF EYELID, UNSPECIFIED LATERALITY: ICD-10-CM

## 2017-06-30 PROCEDURE — 63600175 PHARM REV CODE 636 W HCPCS: Performed by: OPHTHALMOLOGY

## 2017-06-30 PROCEDURE — 71000044 HC DOSC ROUTINE RECOVERY FIRST HOUR: Performed by: OPHTHALMOLOGY

## 2017-06-30 PROCEDURE — 25000003 PHARM REV CODE 250: Performed by: OPHTHALMOLOGY

## 2017-06-30 PROCEDURE — 27201423 OPTIME MED/SURG SUP & DEVICES STERILE SUPPLY: Performed by: OPHTHALMOLOGY

## 2017-06-30 PROCEDURE — 37000008 HC ANESTHESIA 1ST 15 MINUTES: Performed by: OPHTHALMOLOGY

## 2017-06-30 PROCEDURE — 67917 REPAIR EYELID DEFECT: CPT | Mod: E4,,, | Performed by: OPHTHALMOLOGY

## 2017-06-30 PROCEDURE — 37000009 HC ANESTHESIA EA ADD 15 MINS: Performed by: OPHTHALMOLOGY

## 2017-06-30 PROCEDURE — D9220A PRA ANESTHESIA: Mod: CRNA,,, | Performed by: NURSE ANESTHETIST, CERTIFIED REGISTERED

## 2017-06-30 PROCEDURE — 63600175 PHARM REV CODE 636 W HCPCS: Performed by: NURSE ANESTHETIST, CERTIFIED REGISTERED

## 2017-06-30 PROCEDURE — 25000003 PHARM REV CODE 250: Performed by: NURSE ANESTHETIST, CERTIFIED REGISTERED

## 2017-06-30 PROCEDURE — D9220A PRA ANESTHESIA: Mod: ANES,,, | Performed by: ANESTHESIOLOGY

## 2017-06-30 PROCEDURE — 71000015 HC POSTOP RECOV 1ST HR: Performed by: OPHTHALMOLOGY

## 2017-06-30 PROCEDURE — 36000706: Performed by: OPHTHALMOLOGY

## 2017-06-30 PROCEDURE — 71000045 HC DOSC ROUTINE RECOVERY EA ADD'L HR: Performed by: OPHTHALMOLOGY

## 2017-06-30 PROCEDURE — 36000707: Performed by: OPHTHALMOLOGY

## 2017-06-30 RX ORDER — MIDAZOLAM HYDROCHLORIDE 1 MG/ML
INJECTION, SOLUTION INTRAMUSCULAR; INTRAVENOUS
Status: DISCONTINUED | OUTPATIENT
Start: 2017-06-30 | End: 2017-06-30

## 2017-06-30 RX ORDER — TETRACAINE HYDROCHLORIDE 5 MG/ML
1 SOLUTION OPHTHALMIC
Status: DISCONTINUED | OUTPATIENT
Start: 2017-06-30 | End: 2017-06-30 | Stop reason: HOSPADM

## 2017-06-30 RX ORDER — FENTANYL CITRATE 50 UG/ML
INJECTION, SOLUTION INTRAMUSCULAR; INTRAVENOUS
Status: DISCONTINUED | OUTPATIENT
Start: 2017-06-30 | End: 2017-06-30

## 2017-06-30 RX ORDER — LIDOCAINE HYDROCHLORIDE AND EPINEPHRINE 15; 5 MG/ML; UG/ML
INJECTION, SOLUTION EPIDURAL
Status: DISCONTINUED | OUTPATIENT
Start: 2017-06-30 | End: 2017-06-30 | Stop reason: HOSPADM

## 2017-06-30 RX ORDER — OXYCODONE AND ACETAMINOPHEN 5; 325 MG/1; MG/1
1 TABLET ORAL EVERY 6 HOURS PRN
Qty: 20 TABLET | Refills: 0 | Status: SHIPPED | OUTPATIENT
Start: 2017-06-30 | End: 2018-06-30

## 2017-06-30 RX ORDER — BUPIVACAINE HYDROCHLORIDE 5 MG/ML
INJECTION, SOLUTION EPIDURAL; INTRACAUDAL
Status: DISCONTINUED
Start: 2017-06-30 | End: 2017-06-30 | Stop reason: HOSPADM

## 2017-06-30 RX ORDER — CEFAZOLIN SODIUM 2 G/50ML
2 SOLUTION INTRAVENOUS ONCE
Status: DISCONTINUED | OUTPATIENT
Start: 2017-06-30 | End: 2017-06-30 | Stop reason: HOSPADM

## 2017-06-30 RX ORDER — TOBRAMYCIN AND DEXAMETHASONE 3; 1 MG/ML; MG/ML
1 SUSPENSION/ DROPS OPHTHALMIC 4 TIMES DAILY
Qty: 5 ML | Refills: 0 | Status: SHIPPED | OUTPATIENT
Start: 2017-06-30

## 2017-06-30 RX ORDER — LIDOCAINE HCL/PF 100 MG/5ML
SYRINGE (ML) INTRAVENOUS
Status: DISCONTINUED | OUTPATIENT
Start: 2017-06-30 | End: 2017-06-30

## 2017-06-30 RX ORDER — PHENYLEPHRINE HYDROCHLORIDE 10 MG/ML
INJECTION INTRAVENOUS
Status: DISCONTINUED | OUTPATIENT
Start: 2017-06-30 | End: 2017-06-30

## 2017-06-30 RX ORDER — LIDOCAINE HYDROCHLORIDE 10 MG/ML
1 INJECTION, SOLUTION EPIDURAL; INFILTRATION; INTRACAUDAL; PERINEURAL ONCE
Status: COMPLETED | OUTPATIENT
Start: 2017-06-30 | End: 2017-06-30

## 2017-06-30 RX ORDER — SODIUM CHLORIDE, SODIUM LACTATE, POTASSIUM CHLORIDE, CALCIUM CHLORIDE 600; 310; 30; 20 MG/100ML; MG/100ML; MG/100ML; MG/100ML
INJECTION, SOLUTION INTRAVENOUS CONTINUOUS
Status: DISCONTINUED | OUTPATIENT
Start: 2017-06-30 | End: 2017-06-30 | Stop reason: HOSPADM

## 2017-06-30 RX ORDER — BUPIVACAINE HYDROCHLORIDE 5 MG/ML
INJECTION, SOLUTION EPIDURAL; INTRACAUDAL
Status: DISCONTINUED | OUTPATIENT
Start: 2017-06-30 | End: 2017-06-30 | Stop reason: HOSPADM

## 2017-06-30 RX ORDER — PROPOFOL 10 MG/ML
VIAL (ML) INTRAVENOUS
Status: DISCONTINUED | OUTPATIENT
Start: 2017-06-30 | End: 2017-06-30

## 2017-06-30 RX ADMIN — PHENYLEPHRINE HYDROCHLORIDE 100 MCG: 10 INJECTION INTRAVENOUS at 03:06

## 2017-06-30 RX ADMIN — PHENYLEPHRINE HYDROCHLORIDE 100 MCG: 10 INJECTION INTRAVENOUS at 04:06

## 2017-06-30 RX ADMIN — EPHEDRINE SULFATE 10 MG: 50 INJECTION, SOLUTION INTRAMUSCULAR; INTRAVENOUS; SUBCUTANEOUS at 03:06

## 2017-06-30 RX ADMIN — PROPOFOL 100 MG: 10 INJECTION, EMULSION INTRAVENOUS at 03:06

## 2017-06-30 RX ADMIN — LIDOCAINE HYDROCHLORIDE,EPINEPHRINE BITARTRATE 4.5 ML: 15; .005 INJECTION, SOLUTION EPIDURAL; INFILTRATION; INTRACAUDAL; PERINEURAL at 03:06

## 2017-06-30 RX ADMIN — EPHEDRINE SULFATE 10 MG: 50 INJECTION, SOLUTION INTRAMUSCULAR; INTRAVENOUS; SUBCUTANEOUS at 04:06

## 2017-06-30 RX ADMIN — FENTANYL CITRATE 50 MCG: 50 INJECTION, SOLUTION INTRAMUSCULAR; INTRAVENOUS at 03:06

## 2017-06-30 RX ADMIN — PHENYLEPHRINE HYDROCHLORIDE 200 MCG: 10 INJECTION INTRAVENOUS at 03:06

## 2017-06-30 RX ADMIN — LIDOCAINE HYDROCHLORIDE 100 MG: 20 INJECTION, SOLUTION INTRAVENOUS at 03:06

## 2017-06-30 RX ADMIN — SODIUM CHLORIDE, SODIUM GLUCONATE, SODIUM ACETATE, POTASSIUM CHLORIDE, MAGNESIUM CHLORIDE, SODIUM PHOSPHATE, DIBASIC, AND POTASSIUM PHOSPHATE: .53; .5; .37; .037; .03; .012; .00082 INJECTION, SOLUTION INTRAVENOUS at 03:06

## 2017-06-30 RX ADMIN — LIDOCAINE HYDROCHLORIDE 1 MG: 10 INJECTION, SOLUTION EPIDURAL; INFILTRATION; INTRACAUDAL; PERINEURAL at 01:06

## 2017-06-30 RX ADMIN — BUPIVACAINE HYDROCHLORIDE 4.5 ML: 5 INJECTION, SOLUTION EPIDURAL; INTRACAUDAL; PERINEURAL at 03:06

## 2017-06-30 RX ADMIN — HYALURONIDASE, OVINE 1 ML: 200 INJECTION, SOLUTION SUBCUTANEOUS at 03:06

## 2017-06-30 RX ADMIN — MIDAZOLAM HYDROCHLORIDE 2 MG: 1 INJECTION, SOLUTION INTRAMUSCULAR; INTRAVENOUS at 03:06

## 2017-06-30 RX ADMIN — SODIUM CHLORIDE, SODIUM LACTATE, POTASSIUM CHLORIDE, AND CALCIUM CHLORIDE: .6; .31; .03; .02 INJECTION, SOLUTION INTRAVENOUS at 01:06

## 2017-06-30 NOTE — ANESTHESIA RELEASE NOTE
"Anesthesia Release from PACU Note    Patient: Tahir Wynne    Procedure(s) Performed: Procedure(s) (LRB):  REPAIR-ECTROPION (Right)    Anesthesia type: general    Post pain: Adequate analgesia    Post assessment: no apparent anesthetic complications    Last Vitals:   Visit Vitals  /73   Pulse 71   Temp 36.7 °C (98 °F) (Temporal)   Resp 16   Ht 5' 10" (1.778 m)   Wt 79.4 kg (175 lb)   SpO2 98%   BMI 25.11 kg/m²       Post vital signs: stable    Level of consciousness: awake, alert  and oriented    Nausea/Vomiting: no nausea/no vomiting    Complications: none    Airway Patency: patent    Respiratory: unassisted    Cardiovascular: stable and blood pressure at baseline    Hydration: euvolemic  "

## 2017-06-30 NOTE — ANESTHESIA POSTPROCEDURE EVALUATION
"Anesthesia Post Evaluation    Patient: Tahir Wynne    Procedure(s) Performed: Procedure(s) (LRB):  REPAIR-ECTROPION (Right)    Final Anesthesia Type: general  Patient location during evaluation: PACU  Patient participation: Yes- Able to Participate  Level of consciousness: awake and alert  Post-procedure vital signs: reviewed and stable  Pain management: adequate  Airway patency: patent  PONV status at discharge: No PONV  Anesthetic complications: no      Cardiovascular status: blood pressure returned to baseline  Respiratory status: unassisted  Hydration status: euvolemic  Follow-up not needed.        Visit Vitals  /73   Pulse 71   Temp 36.7 °C (98 °F) (Temporal)   Resp 16   Ht 5' 10" (1.778 m)   Wt 79.4 kg (175 lb)   SpO2 98%   BMI 25.11 kg/m²       Pain/Amaury Score: Pain Assessment Performed: Yes (6/30/2017  5:27 PM)  Presence of Pain: denies (6/30/2017  5:27 PM)  Amaury Score: 10 (6/30/2017  5:27 PM)      "

## 2017-06-30 NOTE — PLAN OF CARE
Discharge instructions given, patient verbalized understanding. Consents in chart, Vitals stable, no complaints. Prescriptions given to wife   [phone]

## 2017-06-30 NOTE — OP NOTE
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 Procedure(s) (LRB):  REPAIR-ECTROPION (Right)    SURGEON: Damien Evans M.D.    STAFF: Maya Sandoval MD    COMPLICATIONS: None.     BLOOD LOSS: Less than 5 mL.     ANESTHESIA: General    INDICATIONS FOR SURGERY: 77 y.o. male who presents w/ senile ectropion and dry eye OD, and is here for ectropion repair. Informed consent obtained after extensive risks/benefits/alternatives were discussed with the patient including but not limited to pain, bleeding, infection, ocular injury, loss of the eye, asymmetry, need for revision in future, scarring. Alternatives such as waiting were discussed. All questions were answered.    PROCEDURE IN DETAIL: The patient was brought to the Operating Room table and placed in a supine position. General anesthesia was used throughout the entire procedure without any complications. Once the patient was marked and adequate anesthesia was achieved with local 1.5% Lidocaine with epinephrine, 0.5% Marcaine and vitrase, the patient was then prepped and draped in usual sterile fashion for intraocular surgery. Attention was then placed the right lateral canthal angle. A #15 blade was used to incise the skin approximately 1 cm from the lateral canthal angle laterally and slightly superiorly. Straight Escobar scissors were used to dissect down to the periosteum of the zygomatic rim. The inferior lottie of the lateral canthal tendon was then incised using straight Escobar scissors and electrocautery. Any remaining adhesions were lysed with a combination of blunt dissection with a q-tip and electrocautery which was also used for hemostasis. This released the lower lid, allowing it to be moved laterally freely.     The eyelid was draped laterally over the zygoma, and a full thickness lid excision was taken of the  portion of the lid overlying the zygoma. To fixate the lid down to the rim, a 4-0 Vicryl suture on P-2 semicircular needle was passed anterior-inferiorly through the tarsus exiting through the tarsus post-sup, then passed 3mm posterior to the orbital rim and passed back through the superior lottie of the lateral canthal tendon and tied tightly, allowing the knot to lie flat as a horizontal mattress suture. To reform the lateral canthal angle, a 6-0 Vicryl suture on a S-14 was sewn through the wound exiting the grayline of the lower lid and and then in the reverse through the upper lid. The skin of the lateral canthal incision was then re-apposed using a running 6-0 Prolene suture. Copious Tobradex ointment was placed onto the wound and into the right eye. The patient tolerated the procedure well without any complication. The patient will follow-up with ophthalmology next week.

## 2017-06-30 NOTE — DISCHARGE SUMMARY
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    Disposition: Home or Self Care    Patient Instructions:   Current Discharge Medication List      START taking these medications    Details   oxycodone-acetaminophen (ROXICET) 5-325 mg per tablet Take 1 tablet by mouth every 6 (six) hours as needed for Pain.  Qty: 20 tablet, Refills: 0      tobramycin-dexamethasone 0.3-0.1% (TOBRADEX) 0.3-0.1 % DrpS Place 1 drop into the right eye 4 (four) times daily.  Qty: 5 mL, Refills: 0      tobramycin-dexamethasone 0.3-0.1% (TOBRADEX) 0.3-0.1 % Oint Place into the right eye 4 (four) times daily. Place a 1/2 inch ribbon of ointment into the lower eyelid.  Qty: 3.5 g, Refills: 0         CONTINUE these medications which have NOT CHANGED    Details   acyclovir (ZOVIRAX) 400 MG tablet Take 1 tablet (400 mg total) by mouth 2 (two) times daily.  Qty: 60 tablet, Refills: 1    Associated Diagnoses: Multiple myeloma not having achieved remission      alfuzosin (UROXATRAL) 10 mg Tb24       aspirin (ECOTRIN) 81 MG EC tablet Take 81 mg by mouth once daily.      carvedilol (COREG) 6.25 MG tablet       !! cycloSPORINE (RESTASIS) 0.05 % ophthalmic emulsion 1 drop.      dexamethasone (DECADRON) 4 MG Tab       hydrocortisone 1 % cream       ketoconazole (NIZORAL) 2 % shampoo       latanoprost 0.005 % ophthalmic solution       !! lenalidomide (REVLIMID) 10 mg Cap Take 10 mg by mouth once daily.  Qty: 30 each, Refills: 0    Associated Diagnoses: Multiple myeloma in remission      !! levothyroxine (SYNTHROID) 125 MCG tablet       lifitegrast 5 % Dpet Place 1 drop into the right eye 2 (two) times  daily.  Qty: 5 each, Refills: 11      multivitamin with minerals tablet Take 1 tablet by mouth once daily.      naproxen (NAPROSYN) 500 MG tablet       neomycin-polymyxin-hydrocortisone (CORTISPORIN) otic solution       !! RESTASIS 0.05 % ophthalmic emulsion       !! REVLIMID 25 mg Cap       rosuvastatin (CRESTOR) 20 MG tablet Take 20 mg by mouth.      !! levothyroxine (SYNTHROID) 125 MCG tablet       omeprazole (PRILOSEC) 40 MG capsule        !! - Potential duplicate medications found. Please discuss with provider.          Discharge Procedure Orders (must include Diet, Follow-up, Activity)  Activity: advance ad stephy  F/u: 1 week  Diet: advance to regular

## 2017-06-30 NOTE — TRANSFER OF CARE
"Anesthesia Transfer of Care Note    Patient: Tahir Wynne    Procedure(s) Performed: Procedure(s) (LRB):  REPAIR-ECTROPION (Right)    Patient location: [facility]    Anesthesia Type: general    Transport from OR: Transported from OR on 6-10 L/min O2 by face mask with adequate spontaneous ventilation    Post pain: adequate analgesia    Post assessment: no apparent anesthetic complications    Post vital signs: stable    Level of consciousness: awake    Nausea/Vomiting: no nausea/vomiting    Transfer of care protocol was followed      Last vitals:   Visit Vitals  /71 (BP Location: Left arm, Patient Position: Lying, BP Method: Automatic)   Pulse 88   Temp 36.6 °C (97.9 °F) (Temporal)   Resp 16   Ht 5' 10" (1.778 m)   Wt 79.4 kg (175 lb)   SpO2 100%   BMI 25.11 kg/m²     "

## 2017-06-30 NOTE — ANESTHESIA POSTPROCEDURE EVALUATION
"Anesthesia Post Evaluation    Patient: Tahir Wynne    Procedure(s) Performed: Procedure(s) (LRB):  REPAIR-ECTROPION (Right)    Final Anesthesia Type: general  Patient location during evaluation: PACU  Patient participation: Yes- Able to Participate  Level of consciousness: awake and alert and oriented  Post-procedure vital signs: reviewed and stable  Pain management: adequate  Airway patency: patent  PONV status at discharge: No PONV  Anesthetic complications: no      Cardiovascular status: blood pressure returned to baseline  Respiratory status: unassisted  Hydration status: euvolemic  Follow-up not needed.        Visit Vitals  /73   Pulse 71   Temp 36.7 °C (98 °F) (Temporal)   Resp 16   Ht 5' 10" (1.778 m)   Wt 79.4 kg (175 lb)   SpO2 98%   BMI 25.11 kg/m²       Pain/Amaury Score: Pain Assessment Performed: Yes (6/30/2017  5:27 PM)  Presence of Pain: denies (6/30/2017  5:27 PM)  Amaury Score: 10 (6/30/2017  5:27 PM)      "

## 2017-06-30 NOTE — ANESTHESIA RELEASE NOTE
"Anesthesia Release from PACU Note    Patient: Tahir Wynne    Procedure(s) Performed: Procedure(s) (LRB):  REPAIR-ECTROPION (Right)    Anesthesia type: general    Post pain: Adequate analgesia    Post assessment: no apparent anesthetic complications, tolerated procedure well and no evidence of recall    Last Vitals:   Visit Vitals  /73   Pulse 71   Temp 36.7 °C (98 °F) (Temporal)   Resp 16   Ht 5' 10" (1.778 m)   Wt 79.4 kg (175 lb)   SpO2 98%   BMI 25.11 kg/m²       Post vital signs: stable    Level of consciousness: awake, alert  and oriented    Nausea/Vomiting: no nausea/no vomiting    Complications: none    Airway Patency: patent    Respiratory: unassisted    Cardiovascular: stable and blood pressure at baseline    Hydration: euvolemic  "

## 2017-06-30 NOTE — ANESTHESIA PREPROCEDURE EVALUATION
Anesthesia Evaluation    I have reviewed the Patient Summary Reports.     I have reviewed the Medications.     Review of Systems  Anesthesia Hx:  History of prior surgery of interest to airway management or planning:  Denies Personal Hx of Anesthesia complications.   Social:  Non-Smoker, No Alcohol Use    Hematology/Oncology:     Oncology Normal   Hematology Comments: MM in remission   EENT/Dental:EENT/Dental Normal   Cardiovascular:   Exercise tolerance: good Hypertension, well controlled ECG has been reviewed.    Pulmonary:  Pulmonary Normal    Renal/:  Renal/ Normal     Hepatic/GI:  Hepatic/GI Normal    Musculoskeletal:  Musculoskeletal Normal    Neurological:  Neurology Normal    Endocrine:   Hypothyroidism    Dermatological:  Skin Normal    Psych:  Psychiatric Normal           Physical Exam  General:  Well nourished    Airway/Jaw/Neck:  Airway Findings: Mouth Opening: Normal Tongue: Normal  General Airway Assessment: Adult, Good  Mallampati: III  TM Distance: Normal, at least 6 cm     Eyes/Ears/Nose:  EYES/EARS/NOSE FINDINGS: Normal   Dental:  Dental Findings: In tact   Chest/Lungs:  Chest/Lungs Findings: Clear to auscultation, Normal Respiratory Rate     Heart/Vascular:  Heart Findings: Rate: Normal  Rhythm: Regular Rhythm  Sounds: Normal  Heart murmur: negative       Mental Status:  Mental Status Findings:  Cooperative, Alert and Oriented         Anesthesia Plan  Type of Anesthesia, risks & benefits discussed:  Anesthesia Type:  general  Patient's Preference:   Intra-op Monitoring Plan:   Intra-op Monitoring Plan Comments:   Post Op Pain Control Plan:   Post Op Pain Control Plan Comments:   Induction:   IV  Beta Blocker:  Patient is on a Beta-Blocker and has received one dose within the past 24 hours (No further documentation required).       Informed Consent: Patient understands risks and agrees with Anesthesia plan.  Questions answered. Anesthesia consent signed with patient.  ASA Score: 2      Day of Surgery Review of History & Physical:    H&P update referred to the surgeon.     Anesthesia Plan Notes:   77M HTN, hypothyroid, multiple myeloma in remission, ectropion for repair under GA LMA        Ready For Surgery From Anesthesia Perspective.              Ready For Surgery From Anesthesia Perspective.

## 2017-06-30 NOTE — BRIEF OP NOTE
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

## 2017-06-30 NOTE — DISCHARGE INSTRUCTIONS
ACTIVITY LEVEL:  If you received sedation or an anesthetic, you may feel sleepy for several hours. Rest until you are more awake. Gradually resume your normal activities in two days. Children may return to school in 2-3 days. It is all right to watch television or to read. Swimming is permitted in two weeks.    CARE OF INCISION:  A blood-tinged discharge from the eye is normal. This can be gently washed away with a clean, damp wash cloth. Do not use water, gauze, or cotton to wipe the lids. The morning after surgery, you may have difficulty opening your eyes. This is normal. If dry blood or secretions are holding the lids together, you may open the eyes by gently  the lids from above and below. Please wash your hands thoroughly before doing this. If the lids dont open, do not force them apart. (A child will cry and the tears will soften the secretions and a parent can try again later.) Use cool compresses to the eyes for 24 hours, if tolerated for comfort. Do not place any medication in the eye unless otherwise instructed.    BATHING:  Keep your face out of water for five days after surgery - NO SHOWERS.    DIET:  At home, continue with liquids, and if there is no nausea, you may eat a soft diet. Gradually resume your  normal diet.    PAIN:  If needed for discomfort, you can use cold compresses and take Tylenol (usual recommended dose) every four  hours. Generally, do not take Tylenol more than four times a day.    WHEN TO CALL THE DOCTOR:   Any increase in the amount of swelling of the eyes and adjacent tissues   Heavy yellow discharge from the eyes   Fever over 101ºF (38.4ºC)    A purple discoloration of the lower lids is common. It appears a few days after surgery and does not affect healing. You may experience double vision after surgery. This is normal and will disappear in a few days or weeks. Prescription glasses may be worn unless otherwise instructed. The eyes may be unusually sensitive to  light for several days. Dark sunglasses will help.    FOR EMERGENCIES:  If any unusual problems or difficulties occur, contact Dr. Sandoval or the resident at [phone] (page ) or at the Clinic office, [phone].

## 2017-06-30 NOTE — OP NOTE
6/30/17    Attending: Maya Sandoval MD  Resident: Damien Evans MD  Pre-op Dx: right lower eyelid ectropion  Post-op Dx: same  Procedure: Right lower eyelid ectropion repair (68897) (see operative note for full dictation)  Anesthesia: Local and General   Ebl: less than 5 cc  Complications: none

## 2017-07-03 ENCOUNTER — TELEPHONE (OUTPATIENT)
Dept: OPTOMETRY | Facility: CLINIC | Age: 77
End: 2017-07-03

## 2017-07-03 NOTE — TELEPHONE ENCOUNTER
----- Message from Pavan Pak sent at 7/3/2017  8:36 AM CDT -----  Contact: [place]  Needs to schedule 1 wk post of appt,please call back at [phone] or [phone],thanks

## 2017-07-05 ENCOUNTER — OFFICE VISIT (OUTPATIENT)
Dept: OPHTHALMOLOGY | Facility: CLINIC | Age: 77
End: 2017-07-05
Payer: MEDICARE

## 2017-07-05 DIAGNOSIS — H02.102 ECTROPION OF RIGHT LOWER EYELID, UNSPECIFIED ECTROPION TYPE: ICD-10-CM

## 2017-07-05 DIAGNOSIS — C90.01 MULTIPLE MYELOMA IN REMISSION: ICD-10-CM

## 2017-07-05 DIAGNOSIS — Z98.890 POST-OPERATIVE STATE: Primary | ICD-10-CM

## 2017-07-05 PROCEDURE — 99999 PR PBB SHADOW E&M-EST. PATIENT-LVL II: CPT | Mod: PBBFAC,,, | Performed by: OPHTHALMOLOGY

## 2017-07-05 PROCEDURE — 99024 POSTOP FOLLOW-UP VISIT: CPT | Mod: ,,, | Performed by: OPHTHALMOLOGY

## 2017-07-05 PROCEDURE — 99212 OFFICE O/P EST SF 10 MIN: CPT | Mod: PBBFAC | Performed by: OPHTHALMOLOGY

## 2017-07-05 RX ORDER — LENALIDOMIDE 10 MG/1
10 CAPSULE ORAL DAILY
Qty: 30 EACH | Refills: 0 | Status: SHIPPED | OUTPATIENT
Start: 2017-07-05 | End: 2017-11-03 | Stop reason: SDUPTHER

## 2017-07-05 NOTE — TELEPHONE ENCOUNTER
[facility] received electronic prescription for Revlimid 10mg.  Refill too soon 7/26/2017.  Patient has been filling with [facility].  [facility] will deactivate prescription received today.  Based on next refill paid claim was received today (appears that hardcopy was faxed to [facility])

## 2017-07-05 NOTE — PROGRESS NOTES
HPI     Post-op Evaluation    Additional comments: Pt here for 1 week post-op ectropion RLL            Comments   Pt here for 1 week post-op ectropion RLL, sx on 6/30/2017  Pt states he is doing well OD no longer burning and irritated, the only   complaint he has is his distance vision is still not very well. Denies any   eye pain.   Pt is still using Tobradex gtts and shahid QID        Last edited by Linda Vital, PCT on 7/5/2017  8:10 AM. (History)            Assessment /Plan     For exam results, see Encounter Report.    Post-operative state    Ectropion of right lower eyelid, unspecified ectropion type      Patient doing well! Post-operative instructions reviewed. Sutures removed. All questions answered.  Return in 3 weeks prn sooner any worsening of vision/symptoms or any concerns.

## 2017-07-05 NOTE — TELEPHONE ENCOUNTER
----- Message from Maura Trammell sent at 7/5/2017  9:18 AM CDT -----  Contact:  (wife)  Pt is requesting a refill on Revlimid 10mg to be sent to [facility] [phone]    Wife states this is her 2nd call regarding this matter    Pt contact number [phone]( home) or [phone] (cell)  Thanks

## 2017-07-14 ENCOUNTER — OFFICE VISIT (OUTPATIENT)
Dept: HEMATOLOGY/ONCOLOGY | Facility: CLINIC | Age: 77
End: 2017-07-14
Payer: MEDICARE

## 2017-07-14 ENCOUNTER — LAB VISIT (OUTPATIENT)
Dept: LAB | Facility: HOSPITAL | Age: 77
End: 2017-07-14
Attending: INTERNAL MEDICINE
Payer: MEDICARE

## 2017-07-14 VITALS
TEMPERATURE: 98 F | BODY MASS INDEX: 25.13 KG/M2 | DIASTOLIC BLOOD PRESSURE: 63 MMHG | HEIGHT: 70 IN | WEIGHT: 175.5 LBS | SYSTOLIC BLOOD PRESSURE: 134 MMHG | HEART RATE: 65 BPM

## 2017-07-14 DIAGNOSIS — C90.00 MULTIPLE MYELOMA NOT HAVING ACHIEVED REMISSION: ICD-10-CM

## 2017-07-14 DIAGNOSIS — C90.01 MULTIPLE MYELOMA IN REMISSION: ICD-10-CM

## 2017-07-14 DIAGNOSIS — D70.2 OTHER DRUG-INDUCED NEUTROPENIA: Primary | ICD-10-CM

## 2017-07-14 DIAGNOSIS — Z76.82 STEM CELL TRANSPLANT CANDIDATE: ICD-10-CM

## 2017-07-14 LAB
ALBUMIN SERPL BCP-MCNC: 3.2 G/DL
ALP SERPL-CCNC: 51 U/L
ALT SERPL W/O P-5'-P-CCNC: 18 U/L
ANION GAP SERPL CALC-SCNC: 5 MMOL/L
AST SERPL-CCNC: 13 U/L
BASOPHILS # BLD AUTO: 0.01 K/UL
BASOPHILS NFR BLD: 0.4 %
BILIRUB SERPL-MCNC: 0.5 MG/DL
BUN SERPL-MCNC: 22 MG/DL
CALCIUM SERPL-MCNC: 9.2 MG/DL
CHLORIDE SERPL-SCNC: 108 MMOL/L
CO2 SERPL-SCNC: 25 MMOL/L
CREAT SERPL-MCNC: 1.1 MG/DL
DIFFERENTIAL METHOD: ABNORMAL
EOSINOPHIL # BLD AUTO: 0.1 K/UL
EOSINOPHIL NFR BLD: 5.3 %
ERYTHROCYTE [DISTWIDTH] IN BLOOD BY AUTOMATED COUNT: 14.5 %
EST. GFR  (AFRICAN AMERICAN): >60 ML/MIN/1.73 M^2
EST. GFR  (NON AFRICAN AMERICAN): >60 ML/MIN/1.73 M^2
GLUCOSE SERPL-MCNC: 102 MG/DL
HCT VFR BLD AUTO: 38.1 %
HGB BLD-MCNC: 12.6 G/DL
IGA SERPL-MCNC: 89 MG/DL
IGG SERPL-MCNC: 585 MG/DL
IGM SERPL-MCNC: 38 MG/DL
LYMPHOCYTES # BLD AUTO: 0.8 K/UL
LYMPHOCYTES NFR BLD: 28.6 %
MCH RBC QN AUTO: 30.9 PG
MCHC RBC AUTO-ENTMCNC: 33.1 %
MCV RBC AUTO: 93 FL
MONOCYTES # BLD AUTO: 0.9 K/UL
MONOCYTES NFR BLD: 33.1 %
NEUTROPHILS # BLD AUTO: 0.9 K/UL
NEUTROPHILS NFR BLD: 32.6 %
PLATELET # BLD AUTO: 100 K/UL
PMV BLD AUTO: 10.9 FL
POTASSIUM SERPL-SCNC: 4.6 MMOL/L
PROT SERPL-MCNC: 5.9 G/DL
RBC # BLD AUTO: 4.08 M/UL
SODIUM SERPL-SCNC: 138 MMOL/L
WBC # BLD AUTO: 2.66 K/UL

## 2017-07-14 PROCEDURE — 86334 IMMUNOFIX E-PHORESIS SERUM: CPT | Mod: 26,,, | Performed by: PATHOLOGY

## 2017-07-14 PROCEDURE — 82784 ASSAY IGA/IGD/IGG/IGM EACH: CPT | Mod: 59

## 2017-07-14 PROCEDURE — 1126F AMNT PAIN NOTED NONE PRSNT: CPT | Mod: ,,, | Performed by: INTERNAL MEDICINE

## 2017-07-14 PROCEDURE — 1159F MED LIST DOCD IN RCRD: CPT | Mod: ,,, | Performed by: INTERNAL MEDICINE

## 2017-07-14 PROCEDURE — 36415 COLL VENOUS BLD VENIPUNCTURE: CPT

## 2017-07-14 PROCEDURE — 99214 OFFICE O/P EST MOD 30 MIN: CPT | Mod: S$PBB,,, | Performed by: INTERNAL MEDICINE

## 2017-07-14 PROCEDURE — 86334 IMMUNOFIX E-PHORESIS SERUM: CPT

## 2017-07-14 PROCEDURE — 99999 PR PBB SHADOW E&M-EST. PATIENT-LVL IV: CPT | Mod: PBBFAC,,, | Performed by: INTERNAL MEDICINE

## 2017-07-14 PROCEDURE — 83520 IMMUNOASSAY QUANT NOS NONAB: CPT

## 2017-07-14 PROCEDURE — 84165 PROTEIN E-PHORESIS SERUM: CPT | Mod: 26,,, | Performed by: PATHOLOGY

## 2017-07-14 PROCEDURE — 80053 COMPREHEN METABOLIC PANEL: CPT

## 2017-07-14 PROCEDURE — 84165 PROTEIN E-PHORESIS SERUM: CPT

## 2017-07-14 PROCEDURE — 85025 COMPLETE CBC W/AUTO DIFF WBC: CPT

## 2017-07-14 RX ORDER — CEPHALEXIN 500 MG/1
500 CAPSULE ORAL EVERY 12 HOURS
COMMUNITY

## 2017-07-14 NOTE — Clinical Note
cbc, cmp, serum free light chains, quantitative immunoglobulins, serum electropheresis, [facility] for zometa at [facility] in 8 weeks

## 2017-07-14 NOTE — PROGRESS NOTES
SECTION OF HEMATOLOGY AND BONE MARROW TRANSPLANT  Return Patient Visit   07/17/2017  Referred by:  No ref. provider found  Referred for:  MM, autoSCT    CHIEF COMPLAINT:   Chief Complaint   Patient presents with    Multiple Myeloma       HISTORY OF PRESENT ILLNESS:   77 yo male with pmh as below, all well controlled on current medications; diagnosis of systemic myeloma, cytogenetic studies normal.  Initially presentedfor cataract surgery.  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  Patient subsequently completed 23 planned fractions of XRT + dex with rad onc in [place].     Both have resulted in improvement in proptosis and vision.  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  PET scan showed known orbit lesion as well as multiple other sites of  Bone disease.    He has completed 9 cycles of VRd and given CR on restaging, age, patient preference, decision was made not to proceed with transplant.  He was transitioned to revlimid maintenance may 2017 and has been tolerating well.  He had ectropion repair with opthalmology on 6/30/17 with near resolution of dry/irritated eyes.  He is overall doing well.  Comes to clinic with multiple family members.    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 He has never used smokeless tobacco. He reports that he drinks about 4.2 oz of alcohol per week . He reports that  he does not use drugs.    FAMILY HISTORY:  Family History   Problem Relation Age of Onset    Cancer Mother     Prostate cancer Father     Coronary artery disease Father     Dementia Sister     Stroke Brother     Dementia Brother     No Known Problems Brother     Dementia Brother     No Known Problems Brother     Pancreatitis Brother     Cancer Brother     No Known Problems Sister     No Known Problems Sister        CURRENT MEDICATIONS:   Current Outpatient Prescriptions   Medication Sig    acyclovir (ZOVIRAX) 400 MG tablet Take 1 tablet (400 mg total) by mouth 2 (two) times daily.    alfuzosin (UROXATRAL) 10 mg Tb24     aspirin (ECOTRIN) 81 MG EC tablet Take 81 mg by mouth once daily.    carvedilol (COREG) 6.25 MG tablet     cephALEXin (KEFLEX) 500 MG capsule Take 500 mg by mouth every 12 (twelve) hours.    cycloSPORINE (RESTASIS) 0.05 % ophthalmic emulsion 1 drop.    dexamethasone (DECADRON) 4 MG Tab     hydrocortisone 1 % cream     ketoconazole (NIZORAL) 2 % shampoo     latanoprost 0.005 % ophthalmic solution     lenalidomide (REVLIMID) 10 mg Cap Take 10 mg by mouth once daily.    levothyroxine (SYNTHROID) 125 MCG tablet     levothyroxine (SYNTHROID) 125 MCG tablet     lifitegrast 5 % Dpet Place 1 drop into the right eye 2 (two) times daily.    multivitamin with minerals tablet Take 1 tablet by mouth once daily.    naproxen (NAPROSYN) 500 MG tablet     neomycin-polymyxin-hydrocortisone (CORTISPORIN) otic solution     omeprazole (PRILOSEC) 40 MG capsule     oxycodone-acetaminophen (ROXICET) 5-325 mg per tablet Take 1 tablet by mouth every 6 (six) hours as needed for Pain.    RESTASIS 0.05 % ophthalmic emulsion     REVLIMID 10 mg Cap     rosuvastatin (CRESTOR) 20 MG tablet Take 20 mg by mouth.    tobramycin-dexamethasone 0.3-0.1% (TOBRADEX) 0.3-0.1 % DrpS Place 1 drop into the right eye 4 (four) times daily.     No current facility-administered medications for this visit.       ALLERGIES:   Review of patient's allergies indicates:   Allergen Reactions    Decongest tabs      All over the counter medications containing decongestive.              REVIEW OF SYSTEMS:   General ROS: negative  Psychological ROS: negative  Ophthalmic ROS: see HPI  ENT ROS: + right eye blurry vision   Allergy and Immunology ROS: negative  Hematological and Lymphatic ROS: negative  Endocrine ROS: negative  Respiratory ROS: negative  Cardiovascular ROS: negative  Gastrointestinal ROS: negative  Genito-Urinary ROS: negative  Musculoskeletal ROS: negative  Neurological ROS: negative  Dermatological ROS: negative    PHYSICAL EXAM:   Vitals:    07/14/17 1355   BP: 134/63   Pulse: 65   Temp: 98.2 °F (36.8 °C)       General - well developed, well nourished, no apparent distress  Head & Face - no sinus tenderness  Eyes - normal conjunctivae and lids   ENT - normal external auditory canals and tympanic membranes bilaterally oropharynx clear,  Normal dentition and gums  Neck - normal thyroid  Chest and Lung - normal respiratory effort, clear to auscultation bilaterally   Cardiovascular - RRR with no MGR, normal S1 and S2; no pedal edema  Abdomen -  soft, nontender, no palpable hepatomegaly or splenomegaly  Lymph - no palpable lymphadenopathy  Extremities - unremarkable nails and digits  Heme - no bruising, petechiae, pallor  Skin - no rashes or lesions  Psych - appropriate mood and affect      ECOG Performance Status: (foot note - ECOG PS provided by [employer] Co[employer]cology [employer]) 1 - Symptomatic but completely ambulatory    Karnofsky Performance Score:  90%- Able to Carry on Normal Activity: Minor Symptoms of Disease  DATA: .  Lab Results   Component Value Date    WBC 2.66 (L) 07/14/2017    HGB 12.6 (L) 07/14/2017    HCT 38.1 (L) 07/14/2017    MCV 93 07/14/2017     (L) 07/14/2017     Gran #   Date Value Ref Range Status   07/14/2017 0.9 (L) 1.8 - 7.7 K/uL Final     Gran%   Date Value Ref Range Status    07/14/2017 32.6 (L) 38.0 - 73.0 % Final     Lymph #   Date Value Ref Range Status   07/14/2017 0.8 (L) 1.0 - 4.8 K/uL Final     Lymph%   Date Value Ref Range Status   07/14/2017 28.6 18.0 - 48.0 % Final     CMP  Sodium   Date Value Ref Range Status   07/14/2017 138 136 - 145 mmol/L Final     Potassium   Date Value Ref Range Status   07/14/2017 4.6 3.5 - 5.1 mmol/L Final     Chloride   Date Value Ref Range Status   07/14/2017 108 95 - 110 mmol/L Final     CO2   Date Value Ref Range Status   07/14/2017 25 23 - 29 mmol/L Final     Glucose   Date Value Ref Range Status   07/14/2017 102 70 - 110 mg/dL Final     BUN, Bld   Date Value Ref Range Status   07/14/2017 22 8 - 23 mg/dL Final     Creatinine   Date Value Ref Range Status   07/14/2017 1.1 0.5 - 1.4 mg/dL Final     Calcium   Date Value Ref Range Status   07/14/2017 9.2 8.7 - 10.5 mg/dL Final     Total Protein   Date Value Ref Range Status   07/14/2017 5.9 (L) 6.0 - 8.4 g/dL Final     Albumin   Date Value Ref Range Status   07/14/2017 3.2 (L) 3.5 - 5.2 g/dL Final     Total Bilirubin   Date Value Ref Range Status   07/14/2017 0.5 0.1 - 1.0 mg/dL Final     Comment:     For infants and newborns, interpretation of results should be based  on gestational age, weight and in agreement with clinical  observations.  Premature Infant recommended reference ranges:  Up to 24 hours.............<8.0 mg/dL  Up to 48 hours............<12.0 mg/dL  3-5 days..................<15.0 mg/dL  6-29 days.................<15.0 mg/dL       Alkaline Phosphatase   Date Value Ref Range Status   07/14/2017 51 (L) 55 - 135 U/L Final     AST   Date Value Ref Range Status   07/14/2017 13 10 - 40 U/L Final     ALT   Date Value Ref Range Status   07/14/2017 18 10 - 44 U/L Final     Anion Gap   Date Value Ref Range Status   07/14/2017 5 (L) 8 - 16 mmol/L Final     eGFR if    Date Value Ref Range Status   07/14/2017 >60.0 >60 mL/min/1.73 m^2 Final     eGFR if non    Date  Value Ref Range Status   07/14/2017 >60.0 >60 mL/min/1.73 m^2 Final     Comment:     Calculation used to obtain the estimated glomerular filtration  rate (eGFR) is the CKD-EPI equation. Since race is unknown   in our information system, the eGFR values for   -American and Non--American patients are given   for each creatinine result.       IgG - Serum   Date Value Ref Range Status   07/14/2017 585 (L) 650 - 1600 mg/dL Final     Comment:     IgG Cord Blood Reference Range: 650-1600 mg/dL.     IgA   Date Value Ref Range Status   07/14/2017 89 40 - 350 mg/dL Final     Comment:     IgA Cord Blood Reference Range: <5 mg/dL.     IgM   Date Value Ref Range Status   07/14/2017 38 (L) 50 - 300 mg/dL Final     Comment:     IgM Cord Blood Reference Range: <25 mg/dL.     Kappa Free Light Chains   Date Value Ref Range Status   05/22/2017 1.59 0.33 - 1.94 mg/dL Final   01/06/2017 1.96 (H) 0.33 - 1.94 mg/dL Final   10/13/2016 1.02 0.33 - 1.94 mg/dL Final     Lambda Free Light Chains   Date Value Ref Range Status   05/22/2017 1.80 0.57 - 2.63 mg/dL Final   01/06/2017 4.29 (H) 0.57 - 2.63 mg/dL Final   10/13/2016 38.98 (H) 0.57 - 2.63 mg/dL Final     Kappa/Lambda FLC Ratio   Date Value Ref Range Status   05/22/2017 0.88 0.26 - 1.65 Final   01/06/2017 0.46 0.26 - 1.65 Final   10/13/2016 0.03 (L) 0.26 - 1.65 Final      MRI orbit - 4/171/17  Narrative     Comparison CT 2016 and MRI 03/02/2017 studies.  MRI brain face and orbits without and with gadolinium 7 cc intravenous.    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      No new intraosseous or intracranial lesions.  The vertebral, basilar common internal carotid arteries and major branches remain patent.  Few tiny foci  hyperattenuation posterior frontal high convexity subcortical deep white matter unchanged.  Central and cortical atrophy appropriate for age.    Mucous retention cyst right maxillary  sinus, post cataract surgery on right stable.  Mucosal hypertrophy of the ethmoid and frontal sinuses unchanged.   Impression      1. Right sphenoid wing intraosseous lesion attributed to plasmacytoma is stable, allowing for differences in technique and positioning.    2.  Brain stable with few tiny areas of focal remote gliosis.       PLEASE SEE EPIC MEDIA TAB FOR ALL IMAGING, PATHOLOGY REPORTS FROM OSH  ASSESSMENT AND PLAN:   Encounter Diagnoses   Name Primary?   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 now symptomatically resolved   -completed  9 cycles of VRd and achieved a IMWG CR on restaging pet, bone marrow biopsy, and biochemical studies may 2017; transitioned to revlimid maintenance 10mg daily may 2017; today's biochemical studies pending   -monitor mild cytopenia; may need to transition to day 1-21 of 28 day cycle as opposed to daily if cytopenias persist  -suspect given systemic reponse and XRT to this area that the MRI finding represent scar tissue; he will follow up with opthalmology her next week for fu for possible intervention   -supportive acyclovir for 2 months from last velcade dose, asa, bisphosphanate   -to have colonscopy to have cecal abnormality; will inquire if he had this at next appt   -ocular symptoms resolved s/p ectropion repair on 6/30/17    Follow Up: cbc, cmp, serum free light chains, quantitative immunoglobulins, serum electropheresis, serum immunofixation lab only in  4 weeks at [facility]  -same labs, MD appt, chair for zometa at [facility] in 8 weeks     Bernardo Park MD  Hematology/Oncology/Bone Marrow Transplant

## 2017-07-17 LAB
ALBUMIN SERPL ELPH-MCNC: 3.32 G/DL
ALPHA1 GLOB SERPL ELPH-MCNC: 0.37 G/DL
ALPHA2 GLOB SERPL ELPH-MCNC: 0.73 G/DL
B-GLOBULIN SERPL ELPH-MCNC: 0.61 G/DL
GAMMA GLOB SERPL ELPH-MCNC: 0.57 G/DL
INTERPRETATION SERPL IFE-IMP: NORMAL
KAPPA LC SER QL IA: 3.11 MG/DL
KAPPA LC/LAMBDA SER IA: 0.99
LAMBDA LC SER QL IA: 3.14 MG/DL
PATHOLOGIST INTERPRETATION IFE: NORMAL
PATHOLOGIST INTERPRETATION SPE: NORMAL
PROT SERPL-MCNC: 5.6 G/DL

## 2017-07-21 DIAGNOSIS — C90.00 MULTIPLE MYELOMA, REMISSION STATUS UNSPECIFIED: Primary | ICD-10-CM

## 2017-07-25 ENCOUNTER — OFFICE VISIT (OUTPATIENT)
Dept: OPHTHALMOLOGY | Facility: CLINIC | Age: 77
End: 2017-07-25
Payer: MEDICARE

## 2017-07-25 DIAGNOSIS — C90.01 MULTIPLE MYELOMA IN REMISSION: ICD-10-CM

## 2017-07-25 DIAGNOSIS — Z98.890 POST-OPERATIVE STATE: Primary | ICD-10-CM

## 2017-07-25 DIAGNOSIS — C90.30 PLASMACYTOMA OF BONE: ICD-10-CM

## 2017-07-25 DIAGNOSIS — H02.109 ECTROPION: ICD-10-CM

## 2017-07-25 DIAGNOSIS — H02.102 ECTROPION OF RIGHT LOWER EYELID, UNSPECIFIED ECTROPION TYPE: ICD-10-CM

## 2017-07-25 PROCEDURE — 92285 EXTERNAL OCULAR PHOTOGRAPHY: CPT | Mod: 50,PBBFAC | Performed by: OPHTHALMOLOGY

## 2017-07-25 PROCEDURE — 99212 OFFICE O/P EST SF 10 MIN: CPT | Mod: PBBFAC | Performed by: OPHTHALMOLOGY

## 2017-07-25 PROCEDURE — 99024 POSTOP FOLLOW-UP VISIT: CPT | Mod: ,,, | Performed by: OPHTHALMOLOGY

## 2017-07-25 PROCEDURE — 99999 PR PBB SHADOW E&M-EST. PATIENT-LVL II: CPT | Mod: PBBFAC,,, | Performed by: OPHTHALMOLOGY

## 2017-07-25 NOTE — PROGRESS NOTES
HPI     Post-op Evaluation    Additional comments: RLL ectropion            Comments   S/P RLL ectropion. 6/30/2017. Patient has no complaints at this time.        Last edited by Pam Alejandro on 7/25/2017 11:15 AM. (History)            Assessment /Plan     For exam results, see Encounter Report.    Post-operative state    Ectropion  -     External/Slit Lamp Photography; Future    Ectropion of right lower eyelid, unspecified ectropion type    Multiple myeloma in remission    Plasmacytoma of bone      Patient doing well! Post-operative instructions reviewed. All questions answered. Return in 4 weeks prn sooner any worsening of vision/symptoms or any concerns.

## 2017-07-26 DIAGNOSIS — C90.00 MULTIPLE MYELOMA, REMISSION STATUS UNSPECIFIED: Primary | ICD-10-CM

## 2017-07-27 RX ORDER — LENALIDOMIDE 10 MG/1
CAPSULE ORAL
Qty: 28 EACH | Refills: 0 | Status: SHIPPED | OUTPATIENT
Start: 2017-07-27 | End: 2017-08-28 | Stop reason: SDUPTHER

## 2017-08-11 ENCOUNTER — LAB VISIT (OUTPATIENT)
Dept: LAB | Facility: HOSPITAL | Age: 77
End: 2017-08-11
Attending: INTERNAL MEDICINE
Payer: MEDICARE

## 2017-08-11 DIAGNOSIS — C90.00 MULTIPLE MYELOMA, REMISSION STATUS UNSPECIFIED: ICD-10-CM

## 2017-08-11 LAB
ALBUMIN SERPL BCP-MCNC: 3.5 G/DL
ALP SERPL-CCNC: 53 U/L
ALT SERPL W/O P-5'-P-CCNC: 24 U/L
ANION GAP SERPL CALC-SCNC: 7 MMOL/L
AST SERPL-CCNC: 16 U/L
BASOPHILS # BLD AUTO: 0.03 K/UL
BASOPHILS NFR BLD: 1.1 %
BILIRUB SERPL-MCNC: 0.5 MG/DL
BUN SERPL-MCNC: 28 MG/DL
CALCIUM SERPL-MCNC: 8.9 MG/DL
CHLORIDE SERPL-SCNC: 108 MMOL/L
CO2 SERPL-SCNC: 24 MMOL/L
CREAT SERPL-MCNC: 1.1 MG/DL
DIFFERENTIAL METHOD: ABNORMAL
EOSINOPHIL # BLD AUTO: 0.1 K/UL
EOSINOPHIL NFR BLD: 4.9 %
ERYTHROCYTE [DISTWIDTH] IN BLOOD BY AUTOMATED COUNT: 13.8 %
EST. GFR  (AFRICAN AMERICAN): >60 ML/MIN/1.73 M^2
EST. GFR  (NON AFRICAN AMERICAN): >60 ML/MIN/1.73 M^2
GLUCOSE SERPL-MCNC: 92 MG/DL
HCT VFR BLD AUTO: 41.8 %
HGB BLD-MCNC: 13.5 G/DL
IGA SERPL-MCNC: 103 MG/DL
IGG SERPL-MCNC: 736 MG/DL
IGM SERPL-MCNC: 33 MG/DL
LYMPHOCYTES # BLD AUTO: 0.9 K/UL
LYMPHOCYTES NFR BLD: 30 %
MCH RBC QN AUTO: 30.1 PG
MCHC RBC AUTO-ENTMCNC: 32.3 G/DL
MCV RBC AUTO: 93 FL
MONOCYTES # BLD AUTO: 0.5 K/UL
MONOCYTES NFR BLD: 19.1 %
NEUTROPHILS # BLD AUTO: 1.3 K/UL
NEUTROPHILS NFR BLD: 44.9 %
PLATELET # BLD AUTO: 87 K/UL
PMV BLD AUTO: 11.8 FL
POTASSIUM SERPL-SCNC: 4.6 MMOL/L
PROT SERPL-MCNC: 6.1 G/DL
RBC # BLD AUTO: 4.49 M/UL
SODIUM SERPL-SCNC: 139 MMOL/L
WBC # BLD AUTO: 2.83 K/UL

## 2017-08-11 PROCEDURE — 84165 PROTEIN E-PHORESIS SERUM: CPT | Mod: 26,,, | Performed by: PATHOLOGY

## 2017-08-11 PROCEDURE — 86334 IMMUNOFIX E-PHORESIS SERUM: CPT | Mod: 26,,, | Performed by: PATHOLOGY

## 2017-08-11 PROCEDURE — 84165 PROTEIN E-PHORESIS SERUM: CPT

## 2017-08-11 PROCEDURE — 82784 ASSAY IGA/IGD/IGG/IGM EACH: CPT | Mod: 59

## 2017-08-11 PROCEDURE — 86334 IMMUNOFIX E-PHORESIS SERUM: CPT

## 2017-08-11 PROCEDURE — 83520 IMMUNOASSAY QUANT NOS NONAB: CPT

## 2017-08-11 PROCEDURE — 36415 COLL VENOUS BLD VENIPUNCTURE: CPT

## 2017-08-11 PROCEDURE — 80053 COMPREHEN METABOLIC PANEL: CPT

## 2017-08-11 PROCEDURE — 85025 COMPLETE CBC W/AUTO DIFF WBC: CPT

## 2017-08-14 LAB
ALBUMIN SERPL ELPH-MCNC: 3.57 G/DL
ALPHA1 GLOB SERPL ELPH-MCNC: 0.26 G/DL
ALPHA2 GLOB SERPL ELPH-MCNC: 0.61 G/DL
B-GLOBULIN SERPL ELPH-MCNC: 0.56 G/DL
GAMMA GLOB SERPL ELPH-MCNC: 0.7 G/DL
INTERPRETATION SERPL IFE-IMP: NORMAL
KAPPA LC SER QL IA: 3.16 MG/DL
KAPPA LC/LAMBDA SER IA: 1.07
LAMBDA LC SER QL IA: 2.95 MG/DL
PATHOLOGIST INTERPRETATION IFE: NORMAL
PATHOLOGIST INTERPRETATION SPE: NORMAL
PROT SERPL-MCNC: 5.7 G/DL

## 2017-08-15 ENCOUNTER — TELEPHONE (OUTPATIENT)
Dept: HEMATOLOGY/ONCOLOGY | Facility: CLINIC | Age: 77
End: 2017-08-15

## 2017-08-15 NOTE — TELEPHONE ENCOUNTER
Spoke with pt's daughter Barbara at 0935 on 08/15/17 and explained that his labs show his myeloma continues to be in complete remission.  No change in medications per Dr. Park, pt's daughter verbalized understanding.   Marina

## 2017-08-28 DIAGNOSIS — C90.00 MULTIPLE MYELOMA, REMISSION STATUS UNSPECIFIED: ICD-10-CM

## 2017-08-28 RX ORDER — LENALIDOMIDE 10 MG/1
CAPSULE ORAL
Qty: 28 EACH | Refills: 0 | Status: SHIPPED | OUTPATIENT
Start: 2017-08-28 | End: 2017-09-28 | Stop reason: SDUPTHER

## 2017-08-29 ENCOUNTER — TELEPHONE (OUTPATIENT)
Dept: HEMATOLOGY/ONCOLOGY | Facility: CLINIC | Age: 77
End: 2017-08-29

## 2017-08-29 NOTE — TELEPHONE ENCOUNTER
----- Message from Yolanda Morejon sent at 8/29/2017 10:06 AM CDT -----  Contact: [facility]  Pharmacy [phone]  Needing Auth on [employer] ASAP   Spoke with pharmacy at 1020am on 08/29/17, [employer]  Auth# [id number] given to process prescription.  Marina

## 2017-08-29 NOTE — TELEPHONE ENCOUNTER
----- Message from Cyndy Cyr sent at 8/29/2017 10:32 AM CDT -----  Contact: pt's wife Suellen [phone]  Good morning, reaching out to you in Dr Ramon's absence until Monday.  Pt  is low on his Revlimid 10 mg, and when his wife called the pharmacy she stated she was told they had not received it yet.  The pharmacy [facility] - [place], [place] - [address] [place] [address]  [phone] was referring to the Prior Authorization that is needed.  I'm hoping that you may be able to help.  The prior authorization must be faxed to    Fax # [phone]  At some one's earliest convenience.  Jessica agarwal    Spoke with pt's wife and explained that [employer] authorization number was given to pharmacy and she can give them a call to set up delivery. Wife verbalized understanding.   Marina

## 2017-08-29 NOTE — TELEPHONE ENCOUNTER
----- Message from Cyndy Cyr sent at 8/29/2017 10:32 AM CDT -----  Contact: pt's wife Suellen [phone]  Good morning, reaching out to you in Dr Ramon's absence until Monday.  Pt  is low on his Revlimid 10 mg, and when his wife called the pharmacy she stated she was told they had not received it yet.  The pharmacy [facility] - [place], [place] - [address] [place] [address]  [phone] was referring to the Prior Authorization that is needed.  I'm hoping that you may be able to help.  The prior authorization must be faxed to    Fax # [phone]  At some one's earliest convenience.  Jessica agarwal    Spoke with pt's wife and explained that [employer] authorization number was given to pharmacy and she can give them a call to set up delivery. Wife verbalized understanding.   Marina

## 2017-09-15 ENCOUNTER — OFFICE VISIT (OUTPATIENT)
Dept: HEMATOLOGY/ONCOLOGY | Facility: CLINIC | Age: 77
End: 2017-09-15
Payer: MEDICARE

## 2017-09-15 ENCOUNTER — LAB VISIT (OUTPATIENT)
Dept: LAB | Facility: HOSPITAL | Age: 77
End: 2017-09-15
Attending: INTERNAL MEDICINE
Payer: MEDICARE

## 2017-09-15 VITALS
HEART RATE: 54 BPM | SYSTOLIC BLOOD PRESSURE: 153 MMHG | BODY MASS INDEX: 24.3 KG/M2 | WEIGHT: 169.75 LBS | HEIGHT: 70 IN | TEMPERATURE: 98 F | DIASTOLIC BLOOD PRESSURE: 68 MMHG

## 2017-09-15 DIAGNOSIS — D70.2 OTHER DRUG-INDUCED NEUTROPENIA: ICD-10-CM

## 2017-09-15 DIAGNOSIS — H57.89 RED EYE: ICD-10-CM

## 2017-09-15 DIAGNOSIS — C90.01 MULTIPLE MYELOMA IN REMISSION: Primary | ICD-10-CM

## 2017-09-15 DIAGNOSIS — C90.00 MULTIPLE MYELOMA, REMISSION STATUS UNSPECIFIED: ICD-10-CM

## 2017-09-15 LAB
ALBUMIN SERPL BCP-MCNC: 3.2 G/DL
ALP SERPL-CCNC: 43 U/L
ALT SERPL W/O P-5'-P-CCNC: 17 U/L
ANION GAP SERPL CALC-SCNC: 7 MMOL/L
AST SERPL-CCNC: 14 U/L
BASOPHILS NFR BLD: 0 %
BILIRUB SERPL-MCNC: 0.5 MG/DL
BUN SERPL-MCNC: 21 MG/DL
CALCIUM SERPL-MCNC: 8.2 MG/DL
CHLORIDE SERPL-SCNC: 109 MMOL/L
CO2 SERPL-SCNC: 24 MMOL/L
CREAT SERPL-MCNC: 1 MG/DL
DIFFERENTIAL METHOD: ABNORMAL
EOSINOPHIL NFR BLD: 5 %
ERYTHROCYTE [DISTWIDTH] IN BLOOD BY AUTOMATED COUNT: 13.7 %
EST. GFR  (AFRICAN AMERICAN): >60 ML/MIN/1.73 M^2
EST. GFR  (NON AFRICAN AMERICAN): >60 ML/MIN/1.73 M^2
GLUCOSE SERPL-MCNC: 101 MG/DL
HCT VFR BLD AUTO: 39.4 %
HGB BLD-MCNC: 13 G/DL
IGA SERPL-MCNC: 117 MG/DL
IGG SERPL-MCNC: 795 MG/DL
IGM SERPL-MCNC: 31 MG/DL
LYMPHOCYTES NFR BLD: 38 %
MCH RBC QN AUTO: 29 PG
MCHC RBC AUTO-ENTMCNC: 33 G/DL
MCV RBC AUTO: 88 FL
MONOCYTES NFR BLD: 23 %
NEUTROPHILS NFR BLD: 31 %
NEUTS BAND NFR BLD MANUAL: 3 %
PLATELET # BLD AUTO: 100 K/UL
PLATELET BLD QL SMEAR: ABNORMAL
PMV BLD AUTO: 11.7 FL
POTASSIUM SERPL-SCNC: 4 MMOL/L
PROT SERPL-MCNC: 5.9 G/DL
RBC # BLD AUTO: 4.49 M/UL
SODIUM SERPL-SCNC: 140 MMOL/L
WBC # BLD AUTO: 1.47 K/UL

## 2017-09-15 PROCEDURE — 80053 COMPREHEN METABOLIC PANEL: CPT

## 2017-09-15 PROCEDURE — 36415 COLL VENOUS BLD VENIPUNCTURE: CPT

## 2017-09-15 PROCEDURE — 99214 OFFICE O/P EST MOD 30 MIN: CPT | Mod: PBBFAC | Performed by: INTERNAL MEDICINE

## 2017-09-15 PROCEDURE — 1126F AMNT PAIN NOTED NONE PRSNT: CPT | Mod: ,,, | Performed by: INTERNAL MEDICINE

## 2017-09-15 PROCEDURE — 84165 PROTEIN E-PHORESIS SERUM: CPT

## 2017-09-15 PROCEDURE — 82784 ASSAY IGA/IGD/IGG/IGM EACH: CPT | Mod: 59

## 2017-09-15 PROCEDURE — 99999 PR PBB SHADOW E&M-EST. PATIENT-LVL IV: CPT | Mod: PBBFAC,,, | Performed by: INTERNAL MEDICINE

## 2017-09-15 PROCEDURE — 85007 BL SMEAR W/DIFF WBC COUNT: CPT

## 2017-09-15 PROCEDURE — 99214 OFFICE O/P EST MOD 30 MIN: CPT | Mod: S$PBB,,, | Performed by: INTERNAL MEDICINE

## 2017-09-15 PROCEDURE — 1159F MED LIST DOCD IN RCRD: CPT | Mod: ,,, | Performed by: INTERNAL MEDICINE

## 2017-09-15 PROCEDURE — 84165 PROTEIN E-PHORESIS SERUM: CPT | Mod: 26,,, | Performed by: PATHOLOGY

## 2017-09-15 PROCEDURE — 86334 IMMUNOFIX E-PHORESIS SERUM: CPT

## 2017-09-15 PROCEDURE — 85027 COMPLETE CBC AUTOMATED: CPT

## 2017-09-15 PROCEDURE — 83520 IMMUNOASSAY QUANT NOS NONAB: CPT | Mod: 59

## 2017-09-15 PROCEDURE — 86334 IMMUNOFIX E-PHORESIS SERUM: CPT | Mod: 26,,, | Performed by: PATHOLOGY

## 2017-09-15 NOTE — PROGRESS NOTES
SECTION OF HEMATOLOGY AND BONE MARROW TRANSPLANT  Return Patient Visit   09/16/2017  Referred by:  No ref. provider found  Referred for:  MM, autoSCT    CHIEF COMPLAINT:   No chief complaint on file.      HISTORY OF PRESENT ILLNESS:   75 yo male with pmh as below, all well controlled on current medications; diagnosis of systemic myeloma, cytogenetic studies normal.  Initially presentedfor cataract surgery.  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  Patient subsequently completed 23 planned fractions of XRT + dex with rad onc in [place].     Both have resulted in improvement in proptosis and vision.  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  PET scan showed known orbit lesion as well as multiple other sites of  Bone disease.    He has completed 9 cycles of VRd and given CR on restaging, age, patient preference, decision was made not to proceed with transplant.  He was transitioned to revlimid maintenance may 2017 and has been tolerating well.  He had ectropion repair with opthalmology on 6/30/17 with near resolution of dry/irritated eyes thought these symptoms have returned over the last 2-3 weeks  .  He has appt with ophtalmology on 9/18/17 to discuss symptoms.    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 He has never used smokeless tobacco. He reports that he drinks about 4.2 oz of alcohol per week  . He reports that he does not use drugs.    FAMILY HISTORY:  Family History   Problem Relation Age of Onset    Cancer Mother     Prostate cancer Father     Coronary artery disease Father     Dementia Sister     Stroke Brother     Dementia Brother     No Known Problems Brother     Dementia Brother     No Known Problems Brother     Pancreatitis Brother     Cancer Brother     No Known Problems Sister     No Known Problems Sister        CURRENT MEDICATIONS:   Current Outpatient Prescriptions   Medication Sig    acyclovir (ZOVIRAX) 400 MG tablet Take 1 tablet (400 mg total) by mouth 2 (two) times daily.    alfuzosin (UROXATRAL) 10 mg Tb24     aspirin (ECOTRIN) 81 MG EC tablet Take 81 mg by mouth once daily.    carvedilol (COREG) 6.25 MG tablet     cephALEXin (KEFLEX) 500 MG capsule Take 500 mg by mouth every 12 (twelve) hours.    cycloSPORINE (RESTASIS) 0.05 % ophthalmic emulsion 1 drop.    dexamethasone (DECADRON) 4 MG Tab     hydrocortisone 1 % cream     ketoconazole (NIZORAL) 2 % shampoo     latanoprost 0.005 % ophthalmic solution     lenalidomide (REVLIMID) 10 mg Cap Take 10 mg by mouth once daily.    levothyroxine (SYNTHROID) 125 MCG tablet     levothyroxine (SYNTHROID) 125 MCG tablet     lifitegrast 5 % Dpet Place 1 drop into the right eye 2 (two) times daily.    multivitamin with minerals tablet Take 1 tablet by mouth once daily.    naproxen (NAPROSYN) 500 MG tablet     neomycin-polymyxin-hydrocortisone (CORTISPORIN) otic solution     omeprazole (PRILOSEC) 40 MG capsule     oxycodone-acetaminophen (ROXICET) 5-325 mg per tablet Take 1 tablet by mouth every 6 (six) hours as needed for Pain.    RESTASIS 0.05 % ophthalmic emulsion     REVLIMID 10 mg Cap Take 1 capsule (10mg) by mouth daily.    rosuvastatin (CRESTOR) 20 MG tablet Take 20 mg by mouth.    tobramycin-dexamethasone 0.3-0.1% (TOBRADEX) 0.3-0.1 % DrpS Place 1 drop into the right eye 4 (four) times daily.     No current  facility-administered medications for this visit.      ALLERGIES:   Review of patient's allergies indicates:   Allergen Reactions    Decongest tabs      All over the counter medications containing decongestive.              REVIEW OF SYSTEMS:   General ROS: negative  Psychological ROS: negative  Ophthalmic ROS: see HPI  ENT ROS: + right eye blurry vision   Allergy and Immunology ROS: negative  Hematological and Lymphatic ROS: negative  Endocrine ROS: negative  Respiratory ROS: negative  Cardiovascular ROS: negative  Gastrointestinal ROS: negative  Genito-Urinary ROS: negative  Musculoskeletal ROS: negative  Neurological ROS: negative  Dermatological ROS: negative    PHYSICAL EXAM:   Vitals:    09/15/17 1146   BP: (!) 153/68   Pulse: (!) 54   Temp: 98.4 °F (36.9 °C)       General - well developed, well nourished, no apparent distress  Head & Face - no sinus tenderness  Eyes - right eye appears red and irritated  ENT - normal external auditory canals and tympanic membranes bilaterally oropharynx clear,  Normal dentition and gums  Neck - normal thyroid  Chest and Lung - normal respiratory effort, clear to auscultation bilaterally   Cardiovascular - RRR with no MGR, normal S1 and S2; no pedal edema  Abdomen -  soft, nontender, no palpable hepatomegaly or splenomegaly  Lymph - no palpable lymphadenopathy  Extremities - unremarkable nails and digits  Heme - no bruising, petechiae, pallor  Skin - no rashes or lesions  Psych - appropriate mood and affect      ECOG Performance Status: (foot note - ECOG PS provided by Eastern Cooperative Oncology Group) 1 - Symptomatic but completely ambulatory    Karnofsky Performance Score:  90%- Able to Carry on Normal Activity: Minor Symptoms of Disease  DATA: .  Lab Results   Component Value Date    WBC 1.47 (LL) 09/15/2017    HGB 13.0 (L) 09/15/2017    HCT 39.4 (L) 09/15/2017    MCV 88 09/15/2017     (L) 09/15/2017     Gran #   Date Value Ref Range Status   08/11/2017 1.3 (L)  1.8 - 7.7 K/uL Final     Gran%   Date Value Ref Range Status   09/15/2017 31.0 (L) 38.0 - 73.0 % Final     Lymph #   Date Value Ref Range Status   08/11/2017 0.9 (L) 1.0 - 4.8 K/uL Final     Lymph%   Date Value Ref Range Status   09/15/2017 38.0 18.0 - 48.0 % Final     CMP  Sodium   Date Value Ref Range Status   09/15/2017 140 136 - 145 mmol/L Final     Potassium   Date Value Ref Range Status   09/15/2017 4.0 3.5 - 5.1 mmol/L Final     Chloride   Date Value Ref Range Status   09/15/2017 109 95 - 110 mmol/L Final     CO2   Date Value Ref Range Status   09/15/2017 24 23 - 29 mmol/L Final     Glucose   Date Value Ref Range Status   09/15/2017 101 70 - 110 mg/dL Final     BUN, Bld   Date Value Ref Range Status   09/15/2017 21 8 - 23 mg/dL Final     Creatinine   Date Value Ref Range Status   09/15/2017 1.0 0.5 - 1.4 mg/dL Final     Calcium   Date Value Ref Range Status   09/15/2017 8.2 (L) 8.7 - 10.5 mg/dL Final     Total Protein   Date Value Ref Range Status   09/15/2017 5.9 (L) 6.0 - 8.4 g/dL Final     Albumin   Date Value Ref Range Status   09/15/2017 3.2 (L) 3.5 - 5.2 g/dL Final     Total Bilirubin   Date Value Ref Range Status   09/15/2017 0.5 0.1 - 1.0 mg/dL Final     Comment:     For infants and newborns, interpretation of results should be based  on gestational age, weight and in agreement with clinical  observations.  Premature Infant recommended reference ranges:  Up to 24 hours.............<8.0 mg/dL  Up to 48 hours............<12.0 mg/dL  3-5 days..................<15.0 mg/dL  6-29 days.................<15.0 mg/dL       Alkaline Phosphatase   Date Value Ref Range Status   09/15/2017 43 (L) 55 - 135 U/L Final     AST   Date Value Ref Range Status   09/15/2017 14 10 - 40 U/L Final     ALT   Date Value Ref Range Status   09/15/2017 17 10 - 44 U/L Final     Anion Gap   Date Value Ref Range Status   09/15/2017 7 (L) 8 - 16 mmol/L Final     eGFR if    Date Value Ref Range Status   09/15/2017 >60.0  >60 mL/min/1.73 m^2 Final     eGFR if non    Date Value Ref Range Status   09/15/2017 >60.0 >60 mL/min/1.73 m^2 Final     Comment:     Calculation used to obtain the estimated glomerular filtration  rate (eGFR) is the CKD-EPI equation. Since race is unknown   in our information system, the eGFR values for   -American and Non--American patients are given   for each creatinine result.       IgG - Serum   Date Value Ref Range Status   09/15/2017 795 650 - 1600 mg/dL Final     Comment:     IgG Cord Blood Reference Range: 650-1600 mg/dL.     IgA   Date Value Ref Range Status   09/15/2017 117 40 - 350 mg/dL Final     Comment:     IgA Cord Blood Reference Range: <5 mg/dL.     IgM   Date Value Ref Range Status   09/15/2017 31 (L) 50 - 300 mg/dL Final     Comment:     IgM Cord Blood Reference Range: <25 mg/dL.     Kappa Free Light Chains   Date Value Ref Range Status   08/11/2017 3.16 (H) 0.33 - 1.94 mg/dL Final   07/14/2017 3.11 (H) 0.33 - 1.94 mg/dL Final   05/22/2017 1.59 0.33 - 1.94 mg/dL Final     Lambda Free Light Chains   Date Value Ref Range Status   08/11/2017 2.95 (H) 0.57 - 2.63 mg/dL Final   07/14/2017 3.14 (H) 0.57 - 2.63 mg/dL Final   05/22/2017 1.80 0.57 - 2.63 mg/dL Final     Kappa/Lambda FLC Ratio   Date Value Ref Range Status   08/11/2017 1.07 0.26 - 1.65 Final   07/14/2017 0.99 0.26 - 1.65 Final   05/22/2017 0.88 0.26 - 1.65 Final      MRI orbit - 4/171/17  Narrative     Comparison CT 2016 and MRI 03/02/2017 studies.  MRI brain face and orbits without and with gadolinium 7 cc intravenous.    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      No new intraosseous or intracranial lesions.  The vertebral, basilar common internal carotid arteries and major branches remain patent.  Few tiny foci  hyperattenuation posterior frontal high convexity subcortical deep white matter unchanged.  Central and cortical atrophy  appropriate for age.    Mucous retention cyst right maxillary sinus, post cataract surgery on right stable.  Mucosal hypertrophy of the ethmoid and frontal sinuses unchanged.   Impression      1. Right sphenoid wing intraosseous lesion attributed to plasmacytoma is stable, allowing for differences in technique and positioning.    2.  Brain stable with few tiny areas of focal remote gliosis.       PLEASE SEE EPIC MEDIA TAB FOR ALL IMAGING, PATHOLOGY REPORTS FROM OSH  ASSESSMENT AND PLAN:   Encounter Diagnoses   Name Primary?   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 now symptomatically resolved   -completed  9 cycles of VRd and achieved a IMWG CR on restaging pet, bone marrow biopsy, and biochemical studies may 2017 staging; transitioned to revlimid maintenance 10mg daily may 2017; remained in remission as of 8/11/17 biochemical studies; today's biochemical studies pending   -due to likely revlimid induced neutropenia , hold revlimid for 1 weeks then  transition to day 1-21 of 28 day cycle   - up with opthalmology her next week for return of right eye symptoms; do not suspect related to progression of any myelomaa   -supportive acyclovir for 2 months from last velcade dose, asa, bisphosphanate   -to have colonscopy to have cecal abnormality; will inquire if he had this at next appt       Follow Up: cbc, cmp, serum free light chains, quantitative immunoglobulins, serum electropheresis, serum immunofixation lab only at [facility] in 1 month  -same labs, and md appt at [facility] in 2 months     Bernardo Park MD  Hematology/Oncology/Bone Marrow Transplant

## 2017-09-15 NOTE — Clinical Note
cbc, cmp, serum free light chains, quantitative immunoglobulins, serum electropheresis, serum immunofixation lab only at [facility] in 1 month -same labs, and md reeset at [facility] in 2 months

## 2017-09-18 ENCOUNTER — OFFICE VISIT (OUTPATIENT)
Dept: OPHTHALMOLOGY | Facility: CLINIC | Age: 77
End: 2017-09-18
Payer: MEDICARE

## 2017-09-18 DIAGNOSIS — C90.30 PLASMACYTOMA OF BONE: ICD-10-CM

## 2017-09-18 DIAGNOSIS — Z98.890 POST-OPERATIVE STATE: Primary | ICD-10-CM

## 2017-09-18 DIAGNOSIS — H02.132 SENILE ECTROPION OF RIGHT LOWER EYELID: ICD-10-CM

## 2017-09-18 LAB
ALBUMIN SERPL ELPH-MCNC: 3.44 G/DL
ALPHA1 GLOB SERPL ELPH-MCNC: 0.3 G/DL
ALPHA2 GLOB SERPL ELPH-MCNC: 0.62 G/DL
B-GLOBULIN SERPL ELPH-MCNC: 0.58 G/DL
GAMMA GLOB SERPL ELPH-MCNC: 0.76 G/DL
INTERPRETATION SERPL IFE-IMP: NORMAL
KAPPA LC SER QL IA: 3.56 MG/DL
KAPPA LC/LAMBDA SER IA: 1.18
LAMBDA LC SER QL IA: 3.02 MG/DL
PATHOLOGIST INTERPRETATION IFE: NORMAL
PATHOLOGIST INTERPRETATION SPE: NORMAL
PROT SERPL-MCNC: 5.7 G/DL

## 2017-09-18 PROCEDURE — 99024 POSTOP FOLLOW-UP VISIT: CPT | Mod: ,,, | Performed by: OPHTHALMOLOGY

## 2017-09-18 PROCEDURE — 99211 OFF/OP EST MAY X REQ PHY/QHP: CPT | Mod: PBBFAC | Performed by: OPHTHALMOLOGY

## 2017-09-18 PROCEDURE — 92285 EXTERNAL OCULAR PHOTOGRAPHY: CPT | Mod: 50,PBBFAC | Performed by: OPHTHALMOLOGY

## 2017-09-18 PROCEDURE — 99999 PR PBB SHADOW E&M-EST. PATIENT-LVL I: CPT | Mod: PBBFAC,,, | Performed by: OPHTHALMOLOGY

## 2017-09-18 RX ORDER — CYCLOSPORINE 0.5 MG/ML
1 EMULSION OPHTHALMIC 2 TIMES DAILY
Qty: 30 EACH | Refills: 12 | Status: SHIPPED | OUTPATIENT
Start: 2017-09-18

## 2017-09-18 NOTE — PROGRESS NOTES
Assessment /Plan     For exam results, see Encounter Report.    Post-operative state  -     External Photography - OU - Both Eyes    Senile ectropion of right lower eyelid    Plasmacytoma of bone    Other orders  -     cycloSPORINE (RESTASIS) 0.05 % ophthalmic emulsion; Place 0.4 mLs (1 drop total) into both eyes 2 (two) times daily.  Dispense: 30 each; Refill: 12      Pt doing well, reports some issues with burning and redness. Using Restasis BID OU, Artificial tears BID OD. Lower lid in good position today, recommend increasing frequency of artificial tears throughout the day. Feeling much better than prior to surgery. Restasis refilled.    History of radiotherapy for right plasmacytoma.    Follow-up with Dr. Ezequiel Alonso    Return prn

## 2017-09-19 ENCOUNTER — TELEPHONE (OUTPATIENT)
Dept: HEMATOLOGY/ONCOLOGY | Facility: CLINIC | Age: 77
End: 2017-09-19

## 2017-09-19 NOTE — TELEPHONE ENCOUNTER
----- Message from Warren Park MD sent at 9/18/2017  5:19 PM CDT -----  Can you pleAse call mr. Wynne and let him know labs show his Myeloma remAins in remission .  Great news!  He's not on my [facility]  Spoke with pt at 0825am on 09/19/17 and explained that his labs show his Myeloma remAins in remission per Dr. Park. Pt verbalized understanding.  Marina

## 2017-09-25 ENCOUNTER — TELEPHONE (OUTPATIENT)
Dept: HEMATOLOGY/ONCOLOGY | Facility: CLINIC | Age: 77
End: 2017-09-25

## 2017-09-25 NOTE — TELEPHONE ENCOUNTER
----- Message from Duncan Cyr sent at 9/25/2017 10:11 AM CDT -----  Contact: Yessica  Needs clarification on instructions for medication lenalidomide (REVLIMID) 10 mg Cap    Contact::[phone]   Left  at 1015am on 09/25/17 to have pt's daughter to return my call  Marina

## 2017-09-27 ENCOUNTER — TELEPHONE (OUTPATIENT)
Dept: HEMATOLOGY/ONCOLOGY | Facility: CLINIC | Age: 77
End: 2017-09-27

## 2017-09-27 NOTE — TELEPHONE ENCOUNTER
----- Message from Patricia Bobby sent at 9/27/2017  3:08 PM CDT -----  Contact: pt's daughter BARBARA [phone]  Barbara requests the nurse to call her at [phone] regarding pt REVLIMID 10 mg Cap.   Left  at 415pm to have pt return my call.  Marina

## 2017-09-28 ENCOUNTER — TELEPHONE (OUTPATIENT)
Dept: HEMATOLOGY/ONCOLOGY | Facility: CLINIC | Age: 77
End: 2017-09-28

## 2017-09-28 DIAGNOSIS — C90.00 MULTIPLE MYELOMA, REMISSION STATUS UNSPECIFIED: ICD-10-CM

## 2017-09-28 RX ORDER — LENALIDOMIDE 10 MG/1
CAPSULE ORAL
Qty: 28 EACH | Refills: 0 | Status: SHIPPED | OUTPATIENT
Start: 2017-09-28 | End: 2017-10-25 | Stop reason: SDUPTHER

## 2017-09-28 NOTE — TELEPHONE ENCOUNTER
Spoke with pt's daughter. Explained that pt was to stop Revlimid for 7 day then restart cycle of taking the Revlimid for 3 weeks and stopping for 1 week per Dr. Park's orders, daughter verbalized understanding.  Marina

## 2017-09-29 NOTE — TELEPHONE ENCOUNTER
----- Message from Va Gaitan sent at 9/29/2017 10:48 AM CDT -----  Contact: Whit from pharmacy  Whit calling stating that they didn't receive the auth for pt medication     Whit call back number  [phone]  Spoke with pharmacy, Document [employer] auth #[id number] (9/29/17) given to process Revlimid prescription.  Marina

## 2017-10-20 ENCOUNTER — LAB VISIT (OUTPATIENT)
Dept: LAB | Facility: HOSPITAL | Age: 77
End: 2017-10-20
Attending: INTERNAL MEDICINE
Payer: MEDICARE

## 2017-10-20 DIAGNOSIS — C90.00 MULTIPLE MYELOMA, REMISSION STATUS UNSPECIFIED: ICD-10-CM

## 2017-10-20 LAB
ALBUMIN SERPL BCP-MCNC: 3.3 G/DL
ALP SERPL-CCNC: 41 U/L
ALT SERPL W/O P-5'-P-CCNC: 20 U/L
ANION GAP SERPL CALC-SCNC: 6 MMOL/L
AST SERPL-CCNC: 14 U/L
BASOPHILS # BLD AUTO: 0.01 K/UL
BASOPHILS NFR BLD: 0.4 %
BILIRUB SERPL-MCNC: 0.5 MG/DL
BUN SERPL-MCNC: 26 MG/DL
CALCIUM SERPL-MCNC: 8.9 MG/DL
CHLORIDE SERPL-SCNC: 107 MMOL/L
CO2 SERPL-SCNC: 24 MMOL/L
CREAT SERPL-MCNC: 1.1 MG/DL
DIFFERENTIAL METHOD: ABNORMAL
EOSINOPHIL # BLD AUTO: 0.1 K/UL
EOSINOPHIL NFR BLD: 5.2 %
ERYTHROCYTE [DISTWIDTH] IN BLOOD BY AUTOMATED COUNT: 14.6 %
EST. GFR  (AFRICAN AMERICAN): >60 ML/MIN/1.73 M^2
EST. GFR  (NON AFRICAN AMERICAN): >60 ML/MIN/1.73 M^2
GLUCOSE SERPL-MCNC: 105 MG/DL
HCT VFR BLD AUTO: 40.8 %
HGB BLD-MCNC: 13.4 G/DL
IGA SERPL-MCNC: 107 MG/DL
IGG SERPL-MCNC: 723 MG/DL
IGM SERPL-MCNC: 30 MG/DL
LYMPHOCYTES # BLD AUTO: 0.7 K/UL
LYMPHOCYTES NFR BLD: 29.7 %
MCH RBC QN AUTO: 29.3 PG
MCHC RBC AUTO-ENTMCNC: 32.8 G/DL
MCV RBC AUTO: 89 FL
MONOCYTES # BLD AUTO: 0.5 K/UL
MONOCYTES NFR BLD: 20.7 %
NEUTROPHILS # BLD AUTO: 1 K/UL
NEUTROPHILS NFR BLD: 44 %
PLATELET # BLD AUTO: 96 K/UL
PMV BLD AUTO: 11.7 FL
POTASSIUM SERPL-SCNC: 4.3 MMOL/L
PROT SERPL-MCNC: 5.8 G/DL
RBC # BLD AUTO: 4.58 M/UL
SODIUM SERPL-SCNC: 137 MMOL/L
WBC # BLD AUTO: 2.32 K/UL

## 2017-10-20 PROCEDURE — 86334 IMMUNOFIX E-PHORESIS SERUM: CPT | Mod: 26,,, | Performed by: PATHOLOGY

## 2017-10-20 PROCEDURE — 83520 IMMUNOASSAY QUANT NOS NONAB: CPT | Mod: 59

## 2017-10-20 PROCEDURE — 84165 PROTEIN E-PHORESIS SERUM: CPT | Mod: 26,,, | Performed by: PATHOLOGY

## 2017-10-20 PROCEDURE — 36415 COLL VENOUS BLD VENIPUNCTURE: CPT

## 2017-10-20 PROCEDURE — 86334 IMMUNOFIX E-PHORESIS SERUM: CPT

## 2017-10-20 PROCEDURE — 80053 COMPREHEN METABOLIC PANEL: CPT

## 2017-10-20 PROCEDURE — 85025 COMPLETE CBC W/AUTO DIFF WBC: CPT

## 2017-10-20 PROCEDURE — 84165 PROTEIN E-PHORESIS SERUM: CPT

## 2017-10-20 PROCEDURE — 82784 ASSAY IGA/IGD/IGG/IGM EACH: CPT | Mod: 59

## 2017-10-23 LAB
ALBUMIN SERPL ELPH-MCNC: 3.56 G/DL
ALPHA1 GLOB SERPL ELPH-MCNC: 0.23 G/DL
ALPHA2 GLOB SERPL ELPH-MCNC: 0.59 G/DL
B-GLOBULIN SERPL ELPH-MCNC: 0.55 G/DL
GAMMA GLOB SERPL ELPH-MCNC: 0.78 G/DL
INTERPRETATION SERPL IFE-IMP: NORMAL
KAPPA LC SER QL IA: 2.73 MG/DL
KAPPA LC/LAMBDA SER IA: 1.26
LAMBDA LC SER QL IA: 2.16 MG/DL
PATHOLOGIST INTERPRETATION IFE: NORMAL
PATHOLOGIST INTERPRETATION SPE: NORMAL
PROT SERPL-MCNC: 5.7 G/DL

## 2017-10-24 ENCOUNTER — TELEPHONE (OUTPATIENT)
Dept: HEMATOLOGY/ONCOLOGY | Facility: CLINIC | Age: 77
End: 2017-10-24

## 2017-10-24 DIAGNOSIS — C90.00 MULTIPLE MYELOMA, REMISSION STATUS UNSPECIFIED: ICD-10-CM

## 2017-10-24 NOTE — TELEPHONE ENCOUNTER
----- Message from Warren Park MD sent at 10/23/2017  5:21 PM CDT -----   Patient not on my [facility]. Can you please let him know labs show continued remission of his myeloma.  Great news! No change in our plans.    ----- Message -----  From: [facility]  Sent: 10/20/2017  12:07 PM  To: Warren Park MD  Spoke with pt at 0940am on 10/24/17 and explained that his labs show continued remission of his myeloma per Dr. Park  Great news  Marina

## 2017-10-25 RX ORDER — LENALIDOMIDE 10 MG/1
CAPSULE ORAL
Qty: 30 EACH | Refills: 2 | Status: SHIPPED | OUTPATIENT
Start: 2017-10-25 | End: 2017-11-24

## 2017-11-02 ENCOUNTER — TELEPHONE (OUTPATIENT)
Dept: HEMATOLOGY/ONCOLOGY | Facility: CLINIC | Age: 77
End: 2017-11-02

## 2017-11-02 NOTE — TELEPHONE ENCOUNTER
----- Message from Duncan Cyr sent at 11/2/2017 11:30 AM CDT -----  Contact: Cyndy- Pharmacy   Will like to know was application sent over to [employer] for medication REVLIMID 10 mg Cap    Contact::[phone]  Spoke with pharmacy and explained that the application was faxed today.  Marina

## 2017-11-03 ENCOUNTER — TELEPHONE (OUTPATIENT)
Dept: HEMATOLOGY/ONCOLOGY | Facility: CLINIC | Age: 77
End: 2017-11-03

## 2017-11-03 DIAGNOSIS — C90.01 MULTIPLE MYELOMA IN REMISSION: ICD-10-CM

## 2017-11-03 RX ORDER — LENALIDOMIDE 10 MG/1
10 CAPSULE ORAL DAILY
Qty: 21 EACH | Refills: 0 | Status: SHIPPED | OUTPATIENT
Start: 2017-11-03 | End: 2017-11-30 | Stop reason: SDUPTHER

## 2017-11-03 NOTE — TELEPHONE ENCOUNTER
----- Message from Patricia Bobby sent at 11/3/2017  2:59 PM CDT -----  Contact: Solomon- [facility] [phone]  Solomon called to inform Dr Park that pt has been approved for free drug program for Revlimid 10 mg. She states the prescription should be faxed to [facility] at [phone].   Prescription was faxed on 11/03/17  Marina

## 2017-11-07 ENCOUNTER — TELEPHONE (OUTPATIENT)
Dept: HEMATOLOGY/ONCOLOGY | Facility: CLINIC | Age: 77
End: 2017-11-07

## 2017-11-07 NOTE — TELEPHONE ENCOUNTER
----- Message from Mandie Bullard sent at 11/7/2017  9:43 AM CST -----  Contact: Ms Wynne /   wife  Patient need refill on medication Revlimid 10mg. Please call pharmacy [phone].  Pt out of medication    Please call Ms Wynne [phone]  Spoke with pt's wife at 1030am on 11/07/17 and explained that the pharmacy has the Revlimid prescription, now they are waiting on the approval letter from [employer] to process prescription, wife verbalized understanding.  Marina

## 2017-11-15 ENCOUNTER — OFFICE VISIT (OUTPATIENT)
Dept: HEMATOLOGY/ONCOLOGY | Facility: CLINIC | Age: 77
End: 2017-11-15
Payer: MEDICARE

## 2017-11-15 ENCOUNTER — LAB VISIT (OUTPATIENT)
Dept: LAB | Facility: HOSPITAL | Age: 77
End: 2017-11-15
Attending: INTERNAL MEDICINE
Payer: MEDICARE

## 2017-11-15 VITALS
TEMPERATURE: 99 F | BODY MASS INDEX: 23.45 KG/M2 | HEIGHT: 70 IN | RESPIRATION RATE: 16 BRPM | SYSTOLIC BLOOD PRESSURE: 155 MMHG | DIASTOLIC BLOOD PRESSURE: 70 MMHG | OXYGEN SATURATION: 99 % | WEIGHT: 163.81 LBS | HEART RATE: 55 BPM

## 2017-11-15 DIAGNOSIS — H57.89 EYE IRRITATION: ICD-10-CM

## 2017-11-15 DIAGNOSIS — C90.00 MULTIPLE MYELOMA, REMISSION STATUS UNSPECIFIED: ICD-10-CM

## 2017-11-15 DIAGNOSIS — D75.9 CYTOPENIA: ICD-10-CM

## 2017-11-15 DIAGNOSIS — C90.01 MULTIPLE MYELOMA IN REMISSION: Primary | ICD-10-CM

## 2017-11-15 LAB
ALBUMIN SERPL BCP-MCNC: 3.5 G/DL
ALP SERPL-CCNC: 50 U/L
ALT SERPL W/O P-5'-P-CCNC: 19 U/L
ANION GAP SERPL CALC-SCNC: 7 MMOL/L
AST SERPL-CCNC: 16 U/L
BASOPHILS # BLD AUTO: 0.02 K/UL
BASOPHILS NFR BLD: 0.8 %
BILIRUB SERPL-MCNC: 0.7 MG/DL
BUN SERPL-MCNC: 22 MG/DL
CALCIUM SERPL-MCNC: 9.2 MG/DL
CHLORIDE SERPL-SCNC: 106 MMOL/L
CO2 SERPL-SCNC: 25 MMOL/L
CREAT SERPL-MCNC: 1 MG/DL
DIFFERENTIAL METHOD: ABNORMAL
EOSINOPHIL # BLD AUTO: 0.1 K/UL
EOSINOPHIL NFR BLD: 4.5 %
ERYTHROCYTE [DISTWIDTH] IN BLOOD BY AUTOMATED COUNT: 14.6 %
EST. GFR  (AFRICAN AMERICAN): >60 ML/MIN/1.73 M^2
EST. GFR  (NON AFRICAN AMERICAN): >60 ML/MIN/1.73 M^2
GLUCOSE SERPL-MCNC: 105 MG/DL
HCT VFR BLD AUTO: 41.2 %
HGB BLD-MCNC: 13.4 G/DL
IGA SERPL-MCNC: 119 MG/DL
IGG SERPL-MCNC: 805 MG/DL
IGM SERPL-MCNC: 32 MG/DL
IMM GRANULOCYTES # BLD AUTO: 0.01 K/UL
IMM GRANULOCYTES NFR BLD AUTO: 0.4 %
LYMPHOCYTES # BLD AUTO: 0.9 K/UL
LYMPHOCYTES NFR BLD: 37.2 %
MCH RBC QN AUTO: 28.6 PG
MCHC RBC AUTO-ENTMCNC: 32.5 G/DL
MCV RBC AUTO: 88 FL
MONOCYTES # BLD AUTO: 0.5 K/UL
MONOCYTES NFR BLD: 18.2 %
NEUTROPHILS # BLD AUTO: 1 K/UL
NEUTROPHILS NFR BLD: 38.9 %
NRBC BLD-RTO: 0 /100 WBC
PLATELET # BLD AUTO: 88 K/UL
PMV BLD AUTO: 9.9 FL
POTASSIUM SERPL-SCNC: 4.9 MMOL/L
PROT SERPL-MCNC: 6 G/DL
RBC # BLD AUTO: 4.69 M/UL
SODIUM SERPL-SCNC: 138 MMOL/L
WBC # BLD AUTO: 2.47 K/UL

## 2017-11-15 PROCEDURE — 84165 PROTEIN E-PHORESIS SERUM: CPT | Mod: 26,,, | Performed by: PATHOLOGY

## 2017-11-15 PROCEDURE — 83520 IMMUNOASSAY QUANT NOS NONAB: CPT | Mod: 59

## 2017-11-15 PROCEDURE — 99213 OFFICE O/P EST LOW 20 MIN: CPT | Mod: PBBFAC | Performed by: INTERNAL MEDICINE

## 2017-11-15 PROCEDURE — 80053 COMPREHEN METABOLIC PANEL: CPT

## 2017-11-15 PROCEDURE — 86334 IMMUNOFIX E-PHORESIS SERUM: CPT | Mod: 26,,, | Performed by: PATHOLOGY

## 2017-11-15 PROCEDURE — 84165 PROTEIN E-PHORESIS SERUM: CPT

## 2017-11-15 PROCEDURE — 99999 PR PBB SHADOW E&M-EST. PATIENT-LVL III: CPT | Mod: PBBFAC,,, | Performed by: INTERNAL MEDICINE

## 2017-11-15 PROCEDURE — 85025 COMPLETE CBC W/AUTO DIFF WBC: CPT

## 2017-11-15 PROCEDURE — 82784 ASSAY IGA/IGD/IGG/IGM EACH: CPT | Mod: 59

## 2017-11-15 PROCEDURE — 86334 IMMUNOFIX E-PHORESIS SERUM: CPT

## 2017-11-15 PROCEDURE — 99215 OFFICE O/P EST HI 40 MIN: CPT | Mod: S$PBB,,, | Performed by: INTERNAL MEDICINE

## 2017-11-15 PROCEDURE — 36415 COLL VENOUS BLD VENIPUNCTURE: CPT

## 2017-11-15 RX ORDER — NEPAFENAC 3 MG/ML
SUSPENSION/ DROPS OPHTHALMIC
Refills: 0 | COMMUNITY
Start: 2017-10-22

## 2017-11-15 RX ORDER — CYCLOSPORINE 0.5 MG/ML
EMULSION OPHTHALMIC
COMMUNITY
Start: 2017-08-24 | End: 2018-03-19 | Stop reason: SDUPTHER

## 2017-11-15 RX ORDER — BRIMONIDINE TARTRATE, TIMOLOL MALEATE 2; 5 MG/ML; MG/ML
SOLUTION/ DROPS OPHTHALMIC
COMMUNITY
Start: 2017-10-30

## 2017-11-15 RX ORDER — DUREZOL 0.5 MG/ML
EMULSION OPHTHALMIC
COMMUNITY
Start: 2017-10-22

## 2017-11-15 NOTE — PROGRESS NOTES
SECTION OF HEMATOLOGY AND BONE MARROW TRANSPLANT  Return Patient Visit   11/16/2017  Referred by:  No ref. provider found  Referred for:  MM, autoSCT    CHIEF COMPLAINT:   No chief complaint on file.      HISTORY OF PRESENT ILLNESS:   76 yo male with pmh as below, all well controlled on current medications; diagnosis of systemic myeloma, cytogenetic studies normal.  Initially presentedfor cataract surgery.  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  Patient subsequently completed 23 planned fractions of XRT + dex with rad onc in [place].     Both have resulted in improvement in proptosis and vision.  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  PET scan showed known orbit lesion as well as multiple other sites of  Bone disease.    He has completed 9 cycles of VRd and given CR on restaging, age, patient preference, decision was made not to proceed with transplant.  He was transitioned to revlimid maintenance may 2017 and has been tolerating well.   Due to cytopenias we transitioned to a 21 day regimen of a 28 day cycle.  Continued mild left eye irritiation but overall doing well. Comes to clinic with his mother and daughter.   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 He has never used smokeless tobacco. He reports that he drinks about 4.2 oz of alcohol per week . He reports that he does not use  drugs.    FAMILY HISTORY:  Family History   Problem Relation Age of Onset    Cancer Mother     Prostate cancer Father     Coronary artery disease Father     Dementia Sister     Stroke Brother     Dementia Brother     No Known Problems Brother     Dementia Brother     No Known Problems Brother     Pancreatitis Brother     Cancer Brother     No Known Problems Sister     No Known Problems Sister        CURRENT MEDICATIONS:   Current Outpatient Prescriptions   Medication Sig    acyclovir (ZOVIRAX) 400 MG tablet Take 1 tablet (400 mg total) by mouth 2 (two) times daily.    alfuzosin (UROXATRAL) 10 mg Tb24     aspirin (ECOTRIN) 81 MG EC tablet Take 81 mg by mouth once daily.    carvedilol (COREG) 6.25 MG tablet     cephALEXin (KEFLEX) 500 MG capsule Take 500 mg by mouth every 12 (twelve) hours.    COMBIGAN 0.2-0.5 % Drop     cycloSPORINE (RESTASIS) 0.05 % ophthalmic emulsion Place 0.4 mLs (1 drop total) into both eyes 2 (two) times daily.    dexamethasone (DECADRON) 4 MG Tab     DUREZOL 0.05 % Drop ophthalmic solution     hydrocortisone 1 % cream     ILEVRO 0.3 % DrpS INSTILL ONE DROP INTO LEFT EYE ONCE DAILY    ketoconazole (NIZORAL) 2 % shampoo     latanoprost 0.005 % ophthalmic solution     lenalidomide (REVLIMID) 10 mg Cap Take 10 mg by mouth once daily. Day 1-21 of 28 day cycle    levothyroxine (SYNTHROID) 125 MCG tablet     levothyroxine (SYNTHROID) 125 MCG tablet     lifitegrast 5 % Dpet Place 1 drop into the right eye 2 (two) times daily.    multivitamin with minerals tablet Take 1 tablet by mouth once daily.    naproxen (NAPROSYN) 500 MG tablet     neomycin-polymyxin-hydrocortisone (CORTISPORIN) otic solution     omeprazole (PRILOSEC) 40 MG capsule     oxycodone-acetaminophen (ROXICET) 5-325 mg per tablet Take 1 tablet by mouth every 6 (six) hours as needed for Pain.    RESTASIS 0.05 % ophthalmic emulsion     RESTASIS MULTIDOSE 0.05 % Drop     REVLIMID 10 mg Cap Take 1  capsule (10mg) by mouth once daily    rosuvastatin (CRESTOR) 20 MG tablet Take 20 mg by mouth.    tobramycin-dexamethasone 0.3-0.1% (TOBRADEX) 0.3-0.1 % DrpS Place 1 drop into the right eye 4 (four) times daily.     No current facility-administered medications for this visit.      ALLERGIES:   Review of patient's allergies indicates:   Allergen Reactions    Decongest tabs      All over the counter medications containing decongestive.              REVIEW OF SYSTEMS:   General ROS: negative  Psychological ROS: negative  Ophthalmic ROS: see HPI  ENT ROS: + right eye blurry vision   Allergy and Immunology ROS: negative  Hematological and Lymphatic ROS: negative  Endocrine ROS: negative  Respiratory ROS: negative  Cardiovascular ROS: negative  Gastrointestinal ROS: negative  Genito-Urinary ROS: negative  Musculoskeletal ROS: negative  Neurological ROS: negative  Dermatological ROS: negative    PHYSICAL EXAM:   Vitals:    11/15/17 1110   BP: (!) 155/70   Pulse: (!) 55   Resp: 16   Temp: 98.6 °F (37 °C)       General - well developed, well nourished, no apparent distress  Head & Face - no sinus tenderness  Eyes - right eye appears red and irritated  ENT - normal external auditory canals and tympanic membranes bilaterally oropharynx clear,  Normal dentition and gums  Neck - normal thyroid  Chest and Lung - normal respiratory effort, clear to auscultation bilaterally   Cardiovascular - RRR with no MGR, normal S1 and S2; no pedal edema  Abdomen -  soft, nontender, no palpable hepatomegaly or splenomegaly  Lymph - no palpable lymphadenopathy  Extremities - unremarkable nails and digits  Heme - no bruising, petechiae, pallor  Skin - no rashes or lesions  Psych - appropriate mood and affect      ECOG Performance Status: (foot note - ECOG PS provided by [employer] Co[employer]cology [employer]) 1 - Symptomatic but completely ambulatory    Karnofsky Performance Score:  90%- Able to Carry on Normal Activity: Minor Symptoms of  Disease  DATA: .  Lab Results   Component Value Date    WBC 2.47 (L) 11/15/2017    HGB 13.4 (L) 11/15/2017    HCT 41.2 11/15/2017    MCV 88 11/15/2017    PLT 88 (L) 11/15/2017     Gran #   Date Value Ref Range Status   11/15/2017 1.0 (L) 1.8 - 7.7 K/uL Final     Gran%   Date Value Ref Range Status   11/15/2017 38.9 38.0 - 73.0 % Final     Lymph #   Date Value Ref Range Status   11/15/2017 0.9 (L) 1.0 - 4.8 K/uL Final     Lymph%   Date Value Ref Range Status   11/15/2017 37.2 18.0 - 48.0 % Final     CMP  Sodium   Date Value Ref Range Status   11/15/2017 138 136 - 145 mmol/L Final     Potassium   Date Value Ref Range Status   11/15/2017 4.9 3.5 - 5.1 mmol/L Final     Chloride   Date Value Ref Range Status   11/15/2017 106 95 - 110 mmol/L Final     CO2   Date Value Ref Range Status   11/15/2017 25 23 - 29 mmol/L Final     Glucose   Date Value Ref Range Status   11/15/2017 105 70 - 110 mg/dL Final     BUN, Bld   Date Value Ref Range Status   11/15/2017 22 8 - 23 mg/dL Final     Creatinine   Date Value Ref Range Status   11/15/2017 1.0 0.5 - 1.4 mg/dL Final     Calcium   Date Value Ref Range Status   11/15/2017 9.2 8.7 - 10.5 mg/dL Final     Total Protein   Date Value Ref Range Status   11/15/2017 6.0 6.0 - 8.4 g/dL Final     Albumin   Date Value Ref Range Status   11/15/2017 3.5 3.5 - 5.2 g/dL Final     Total Bilirubin   Date Value Ref Range Status   11/15/2017 0.7 0.1 - 1.0 mg/dL Final     Comment:     For infants and newborns, interpretation of results should be based  on gestational age, weight and in agreement with clinical  observations.  Premature Infant recommended reference ranges:  Up to 24 hours.............<8.0 mg/dL  Up to 48 hours............<12.0 mg/dL  3-5 days..................<15.0 mg/dL  6-29 days.................<15.0 mg/dL       Alkaline Phosphatase   Date Value Ref Range Status   11/15/2017 50 (L) 55 - 135 U/L Final     AST   Date Value Ref Range Status   11/15/2017 16 10 - 40 U/L Final     ALT    Date Value Ref Range Status   11/15/2017 19 10 - 44 U/L Final     Anion Gap   Date Value Ref Range Status   11/15/2017 7 (L) 8 - 16 mmol/L Final     eGFR if    Date Value Ref Range Status   11/15/2017 >60.0 >60 mL/min/1.73 m^2 Final     eGFR if non    Date Value Ref Range Status   11/15/2017 >60.0 >60 mL/min/1.73 m^2 Final     Comment:     Calculation used to obtain the estimated glomerular filtration  rate (eGFR) is the CKD-EPI equation.        IgG - Serum   Date Value Ref Range Status   11/15/2017 805 650 - 1600 mg/dL Final     Comment:     IgG Cord Blood Reference Range: 650-1600 mg/dL.     IgA   Date Value Ref Range Status   11/15/2017 119 40 - 350 mg/dL Final     Comment:     IgA Cord Blood Reference Range: <5 mg/dL.     IgM   Date Value Ref Range Status   11/15/2017 32 (L) 50 - 300 mg/dL Final     Comment:     IgM Cord Blood Reference Range: <25 mg/dL.     Kappa Free Light Chains   Date Value Ref Range Status   11/15/2017 2.29 (H) 0.33 - 1.94 mg/dL Final   10/20/2017 2.73 (H) 0.33 - 1.94 mg/dL Final   09/15/2017 3.56 (H) 0.33 - 1.94 mg/dL Final     Lambda Free Light Chains   Date Value Ref Range Status   11/15/2017 1.98 0.57 - 2.63 mg/dL Final   10/20/2017 2.16 0.57 - 2.63 mg/dL Final   09/15/2017 3.02 (H) 0.57 - 2.63 mg/dL Final     Kappa/Lambda FLC Ratio   Date Value Ref Range Status   11/15/2017 1.16 0.26 - 1.65 Final   10/20/2017 1.26 0.26 - 1.65 Final   09/15/2017 1.18 0.26 - 1.65 Final      MRI orbit - 4/171/17  Narrative     Comparison CT 2016 and MRI 03/02/2017 studies.  MRI brain face and orbits without and with gadolinium 7 cc intravenous.    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      No new intraosseous or intracranial lesions.  The vertebral, basilar common internal carotid arteries and major branches remain patent.  Few tiny foci  hyperattenuation posterior frontal high convexity  subcortical deep white matter unchanged.  Central and cortical atrophy appropriate for age.    Mucous retention cyst right maxillary sinus, post cataract surgery on right stable.  Mucosal hypertrophy of the ethmoid and frontal sinuses unchanged.   Impression      1. Right sphenoid wing intraosseous lesion attributed to plasmacytoma is stable, allowing for differences in technique and positioning.    2.  Brain stable with few tiny areas of focal remote gliosis.       PLEASE SEE EPIC MEDIA TAB FOR ALL IMAGING, PATHOLOGY REPORTS FROM OSH  ASSESSMENT AND PLAN:   Encounter Diagnoses   Name Primary?   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   -completed  9 cycles of VRd and achieved a IMWG CR on restaging pet, bone marrow biopsy, and biochemical studies may 2017 staging; transitioned to revlimid maintenance 10mg daily may 2017; remained in remission as of oct 2017 biochemical studies; today's biochemical studies pending   - plan for repeat annual imaging SS/LBS at next visit  -continue day 1-21 of 28 day cycle for 10mg revlimid maintenance given cytopenias on continuous daily dosing   -continue fu with local opthalmology for eye irritation; suspect residual effect from radiation and not related to myeloma   -supportive acyclovir for 2 months from last velcade dose, asa, bisphosphanate q 3 months x 1 year; dose 1 of 4 at next visit   -to have colonscopy to have cecal abnormality; will inquire if he had this at next appt       Follow Up: -cbc, cmp, serum free light chains, quantitative immunoglobulins, serum electropheresis, serum immunofixation lab only at [facility] in 1 month  -same labs, md appt, skeletal survey/long bone survey,   and chair for zometa infusion  at [facility] in 2 months     Bernardo Park  MD  Hematology/Oncology/Bone Marrow Transplant

## 2017-11-15 NOTE — PROGRESS NOTES
cpm  Labs 1 month [place] ; possible bone imaging in [place] look back to see if sS was done; may need PET scan; look at guidelines  Labs, md appt, zometa 2 months

## 2017-11-15 NOTE — Clinical Note
-cbc, cmp, serum free light chains, quantitative immunoglobulins, serum electropheresis, serum immunofixation lab only at [facility] in 1 month -same labs, md appt, skeletal survey/long bone survey,   and chair for zometa infusion  at [facility] in 2 months

## 2017-11-16 ENCOUNTER — TELEPHONE (OUTPATIENT)
Dept: HEMATOLOGY/ONCOLOGY | Facility: CLINIC | Age: 77
End: 2017-11-16

## 2017-11-16 DIAGNOSIS — C90.01 MULTIPLE MYELOMA IN REMISSION: Primary | ICD-10-CM

## 2017-11-16 LAB
ALBUMIN SERPL ELPH-MCNC: 3.75 G/DL
ALPHA1 GLOB SERPL ELPH-MCNC: 0.24 G/DL
ALPHA2 GLOB SERPL ELPH-MCNC: 0.61 G/DL
B-GLOBULIN SERPL ELPH-MCNC: 0.57 G/DL
GAMMA GLOB SERPL ELPH-MCNC: 0.74 G/DL
INTERPRETATION SERPL IFE-IMP: NORMAL
KAPPA LC SER QL IA: 2.29 MG/DL
KAPPA LC/LAMBDA SER IA: 1.16
LAMBDA LC SER QL IA: 1.98 MG/DL
PATHOLOGIST INTERPRETATION IFE: NORMAL
PATHOLOGIST INTERPRETATION SPE: NORMAL
PROT SERPL-MCNC: 5.9 G/DL

## 2017-11-16 RX ORDER — HEPARIN 100 UNIT/ML
500 SYRINGE INTRAVENOUS
Status: CANCELLED | OUTPATIENT
Start: 2018-01-16

## 2017-11-16 RX ORDER — SODIUM CHLORIDE 0.9 % (FLUSH) 0.9 %
10 SYRINGE (ML) INJECTION
Status: CANCELLED | OUTPATIENT
Start: 2018-01-16

## 2017-11-16 NOTE — TELEPHONE ENCOUNTER
----- Message from Warren Park MD sent at 11/16/2017  1:18 PM CST -----  Can you let him know his labs show his myeloma remains in complete remission . Great news.   Thank you   They arent on [facility].   ----- Message -----  From: [facility]  Sent: 11/15/2017  10:17 AM  To: Warren Park MD  Spoke with pt at 121pm on 11/16/17 and explained that his labs show his myeloma remains in complete remission . Great news, pt verbalized understanding.  Marina

## 2017-11-30 DIAGNOSIS — C90.01 MULTIPLE MYELOMA IN REMISSION: ICD-10-CM

## 2017-11-30 RX ORDER — LENALIDOMIDE 10 MG/1
10 CAPSULE ORAL DAILY
Qty: 21 EACH | Refills: 0 | Status: SHIPPED | OUTPATIENT
Start: 2017-11-30 | End: 2017-12-21 | Stop reason: SDUPTHER

## 2017-11-30 NOTE — TELEPHONE ENCOUNTER
----- Message from Va Gaitan sent at 11/30/2017 11:00 AM CST -----  Contact: Pt wife [place]  Pt wife calling requesting a refill on Revlimid      [place] call back number  [phone]  Spoke with pt's wife, explained that the Revlimid refill request has been sent to Dr. Park to be approved, once approved it will be sent electronically to their pharmacy, wife verbalized understanding.  Marina

## 2017-12-15 ENCOUNTER — TELEPHONE (OUTPATIENT)
Dept: HEMATOLOGY/ONCOLOGY | Facility: CLINIC | Age: 77
End: 2017-12-15

## 2017-12-15 ENCOUNTER — LAB VISIT (OUTPATIENT)
Dept: LAB | Facility: HOSPITAL | Age: 77
End: 2017-12-15
Attending: INTERNAL MEDICINE
Payer: MEDICARE

## 2017-12-15 DIAGNOSIS — C90.01 MULTIPLE MYELOMA IN REMISSION: ICD-10-CM

## 2017-12-15 LAB
ALBUMIN SERPL BCP-MCNC: 3.6 G/DL
ALP SERPL-CCNC: 51 U/L
ALT SERPL W/O P-5'-P-CCNC: 20 U/L
ANION GAP SERPL CALC-SCNC: 5 MMOL/L
ANISOCYTOSIS BLD QL SMEAR: SLIGHT
AST SERPL-CCNC: 14 U/L
BASOPHILS # BLD AUTO: ABNORMAL K/UL
BASOPHILS NFR BLD: 1 %
BILIRUB SERPL-MCNC: 0.6 MG/DL
BUN SERPL-MCNC: 22 MG/DL
CALCIUM SERPL-MCNC: 9.1 MG/DL
CHLORIDE SERPL-SCNC: 104 MMOL/L
CO2 SERPL-SCNC: 28 MMOL/L
CREAT SERPL-MCNC: 1 MG/DL
DIFFERENTIAL METHOD: ABNORMAL
EOSINOPHIL # BLD AUTO: ABNORMAL K/UL
EOSINOPHIL NFR BLD: 1 %
ERYTHROCYTE [DISTWIDTH] IN BLOOD BY AUTOMATED COUNT: 14.9 %
EST. GFR  (AFRICAN AMERICAN): >60 ML/MIN/1.73 M^2
EST. GFR  (NON AFRICAN AMERICAN): >60 ML/MIN/1.73 M^2
GLUCOSE SERPL-MCNC: 99 MG/DL
HCT VFR BLD AUTO: 40.1 %
HGB BLD-MCNC: 13.4 G/DL
IGA SERPL-MCNC: 124 MG/DL
IGG SERPL-MCNC: 793 MG/DL
IGM SERPL-MCNC: 34 MG/DL
LYMPHOCYTES # BLD AUTO: ABNORMAL K/UL
LYMPHOCYTES NFR BLD: 41 %
MCH RBC QN AUTO: 30.5 PG
MCHC RBC AUTO-ENTMCNC: 33.4 G/DL
MCV RBC AUTO: 91 FL
MONOCYTES # BLD AUTO: ABNORMAL K/UL
MONOCYTES NFR BLD: 0 %
NEUTROPHILS NFR BLD: 36 %
NEUTS BAND NFR BLD MANUAL: 21 %
PLATELET # BLD AUTO: 104 K/UL
PLATELET BLD QL SMEAR: ABNORMAL
PMV BLD AUTO: 10.3 FL
POTASSIUM SERPL-SCNC: 4.5 MMOL/L
PROT SERPL-MCNC: 6 G/DL
RBC # BLD AUTO: 4.4 M/UL
SODIUM SERPL-SCNC: 137 MMOL/L
WBC # BLD AUTO: 1.79 K/UL

## 2017-12-15 PROCEDURE — 85007 BL SMEAR W/DIFF WBC COUNT: CPT

## 2017-12-15 PROCEDURE — 80053 COMPREHEN METABOLIC PANEL: CPT

## 2017-12-15 PROCEDURE — 82784 ASSAY IGA/IGD/IGG/IGM EACH: CPT | Mod: 59

## 2017-12-15 PROCEDURE — 84165 PROTEIN E-PHORESIS SERUM: CPT

## 2017-12-15 PROCEDURE — 84165 PROTEIN E-PHORESIS SERUM: CPT | Mod: 26,,, | Performed by: PATHOLOGY

## 2017-12-15 PROCEDURE — 86334 IMMUNOFIX E-PHORESIS SERUM: CPT

## 2017-12-15 PROCEDURE — 36415 COLL VENOUS BLD VENIPUNCTURE: CPT

## 2017-12-15 PROCEDURE — 86334 IMMUNOFIX E-PHORESIS SERUM: CPT | Mod: 26,,, | Performed by: PATHOLOGY

## 2017-12-15 PROCEDURE — 85027 COMPLETE CBC AUTOMATED: CPT

## 2017-12-15 PROCEDURE — 83520 IMMUNOASSAY QUANT NOS NONAB: CPT | Mod: 59

## 2017-12-18 LAB
ALBUMIN SERPL ELPH-MCNC: 3.53 G/DL
ALPHA1 GLOB SERPL ELPH-MCNC: 0.25 G/DL
ALPHA2 GLOB SERPL ELPH-MCNC: 0.62 G/DL
B-GLOBULIN SERPL ELPH-MCNC: 0.56 G/DL
GAMMA GLOB SERPL ELPH-MCNC: 0.75 G/DL
INTERPRETATION SERPL IFE-IMP: NORMAL
KAPPA LC SER QL IA: 2.55 MG/DL
KAPPA LC/LAMBDA SER IA: 1.18
LAMBDA LC SER QL IA: 2.17 MG/DL
PATHOLOGIST INTERPRETATION IFE: NORMAL
PATHOLOGIST INTERPRETATION SPE: NORMAL
PROT SERPL-MCNC: 5.7 G/DL

## 2017-12-20 ENCOUNTER — TELEPHONE (OUTPATIENT)
Dept: HEMATOLOGY/ONCOLOGY | Facility: CLINIC | Age: 77
End: 2017-12-20

## 2017-12-20 NOTE — TELEPHONE ENCOUNTER
----- Message from Warren Park MD sent at 12/20/2017  2:10 PM CST -----  Can you let him know that myeloma remains in remission so good news.   His white blood cell count low so lets have him hold his revlimid for a week then restart.  We may decrease the dose at his appt in January.   Thank you  ----- Message -----  From: [facility]  Sent: 12/15/2017  11:34 AM  To: Warren Park MD    Spoke with pt's wife at 230pm on 12/20/17, explained that his myeloma remains in remission so good news, but his white blood cell count is low per Dr. Park and recommends that the patient holds his revlimid for a week then restart and that he may decrease the dose at his appt in January., wife verbalized understanding.  Marina

## 2017-12-21 DIAGNOSIS — C90.01 MULTIPLE MYELOMA IN REMISSION: ICD-10-CM

## 2017-12-21 RX ORDER — LENALIDOMIDE 10 MG/1
10 CAPSULE ORAL DAILY
Qty: 21 EACH | Refills: 0 | Status: SHIPPED | OUTPATIENT
Start: 2017-12-21 | End: 2018-03-19 | Stop reason: DRUGHIGH

## 2017-12-21 NOTE — TELEPHONE ENCOUNTER
----- Message from Duncan Cyr sent at 12/20/2017  8:54 AM CST -----  Contact: Spouse   Refill on Revlimid 10 mg     Contact::[phone] or [phone]

## 2017-12-21 NOTE — TELEPHONE ENCOUNTER
----- Message from Deanna Alvarenga sent at 12/21/2017  1:12 PM CST -----  Contact: Pt's wife   Pt's wife is returning a call she missed from the office.    Pt's wife can be reached at  [phone] [place] or [phone].    Thank you   Pharmacy verified with wife.  Marina

## 2018-01-15 ENCOUNTER — HOSPITAL ENCOUNTER (OUTPATIENT)
Dept: RADIOLOGY | Facility: HOSPITAL | Age: 78
Discharge: HOME | End: 2018-01-15
Attending: INTERNAL MEDICINE
Payer: MEDICARE

## 2018-01-15 ENCOUNTER — INFUSION (OUTPATIENT)
Dept: INFUSION THERAPY | Facility: HOSPITAL | Age: 78
End: 2018-01-15
Attending: INTERNAL MEDICINE
Payer: MEDICARE

## 2018-01-15 ENCOUNTER — TELEPHONE (OUTPATIENT)
Dept: HEMATOLOGY/ONCOLOGY | Facility: CLINIC | Age: 78
End: 2018-01-15

## 2018-01-15 ENCOUNTER — OFFICE VISIT (OUTPATIENT)
Dept: HEMATOLOGY/ONCOLOGY | Facility: CLINIC | Age: 78
End: 2018-01-15
Payer: MEDICARE

## 2018-01-15 VITALS
TEMPERATURE: 98 F | WEIGHT: 163.13 LBS | DIASTOLIC BLOOD PRESSURE: 63 MMHG | HEART RATE: 59 BPM | SYSTOLIC BLOOD PRESSURE: 133 MMHG | HEIGHT: 70 IN | OXYGEN SATURATION: 98 % | RESPIRATION RATE: 16 BRPM | BODY MASS INDEX: 23.35 KG/M2

## 2018-01-15 DIAGNOSIS — H57.89 EYE IRRITATION: ICD-10-CM

## 2018-01-15 DIAGNOSIS — C90.01 MULTIPLE MYELOMA IN REMISSION: ICD-10-CM

## 2018-01-15 DIAGNOSIS — C90.01 MULTIPLE MYELOMA IN REMISSION: Primary | ICD-10-CM

## 2018-01-15 DIAGNOSIS — D75.9 CYTOPENIA: ICD-10-CM

## 2018-01-15 DIAGNOSIS — D61.818 PANCYTOPENIA: ICD-10-CM

## 2018-01-15 PROCEDURE — 25000003 PHARM REV CODE 250: Performed by: INTERNAL MEDICINE

## 2018-01-15 PROCEDURE — A4216 STERILE WATER/SALINE, 10 ML: HCPCS | Performed by: INTERNAL MEDICINE

## 2018-01-15 PROCEDURE — 99999 PR PBB SHADOW E&M-EST. PATIENT-LVL V: CPT | Mod: PBBFAC,,, | Performed by: INTERNAL MEDICINE

## 2018-01-15 PROCEDURE — 99215 OFFICE O/P EST HI 40 MIN: CPT | Mod: PBBFAC,25 | Performed by: INTERNAL MEDICINE

## 2018-01-15 PROCEDURE — 77075 RADEX OSSEOUS SURVEY COMPL: CPT | Mod: TC

## 2018-01-15 PROCEDURE — 99214 OFFICE O/P EST MOD 30 MIN: CPT | Mod: S$PBB,,, | Performed by: INTERNAL MEDICINE

## 2018-01-15 PROCEDURE — 77075 RADEX OSSEOUS SURVEY COMPL: CPT | Mod: 26,,, | Performed by: RADIOLOGY

## 2018-01-15 PROCEDURE — 63600175 PHARM REV CODE 636 W HCPCS: Performed by: INTERNAL MEDICINE

## 2018-01-15 PROCEDURE — 96365 THER/PROPH/DIAG IV INF INIT: CPT

## 2018-01-15 RX ORDER — HEPARIN 100 UNIT/ML
500 SYRINGE INTRAVENOUS
Status: DISCONTINUED | OUTPATIENT
Start: 2018-01-15 | End: 2018-01-15 | Stop reason: HOSPADM

## 2018-01-15 RX ORDER — SODIUM CHLORIDE 0.9 % (FLUSH) 0.9 %
10 SYRINGE (ML) INJECTION
Status: DISCONTINUED | OUTPATIENT
Start: 2018-01-15 | End: 2018-01-15 | Stop reason: HOSPADM

## 2018-01-15 RX ORDER — LENALIDOMIDE 5 MG/1
5 CAPSULE ORAL DAILY
Qty: 28 EACH | Refills: 0 | Status: SHIPPED | OUTPATIENT
Start: 2018-01-15 | End: 2018-02-07 | Stop reason: SDUPTHER

## 2018-01-15 RX ADMIN — SODIUM CHLORIDE, PRESERVATIVE FREE 10 ML: 5 INJECTION INTRAVENOUS at 03:01

## 2018-01-15 RX ADMIN — SODIUM CHLORIDE 4 MG: 9 INJECTION, SOLUTION INTRAVENOUS at 03:01

## 2018-01-15 NOTE — Clinical Note
-cbc, cmp, serum free light chains, quantitative immunoglobulins, serum electropheresis, serum immunofixation lab only at [facility] in 1 month -same labs, md appt, skeletal survey/long bone survey,   and chair for zometa infusion  at [facility] in 2 months

## 2018-01-15 NOTE — TELEPHONE ENCOUNTER
----- Message from Duncan Cyr sent at 1/15/2018  3:50 PM CST -----  Contact: [facility]  [employer] auth is needed for Revlimid     Contact::[phone]  Spoke with pharmacy tech at 424pm on 01/15/18, [employer] auth#[id number] obtained on 01/15/18 given to process Revlimid prescription.  Marina

## 2018-01-15 NOTE — PLAN OF CARE
Problem: Patient Care Overview (Adult)  Goal: Plan of Care Review  Outcome: Ongoing (interventions implemented as appropriate)  Pt received zometa without complications. VSS. No s/s of reaction. AVS given to patient.

## 2018-01-15 NOTE — PROGRESS NOTES
Decrease rev 5mg daily due to cytopenias  Labs in 4 weeks   Labs and come see me in 2 months  Continue asa   zometa  Today and q 3 months  Await bcs from today

## 2018-01-15 NOTE — PROGRESS NOTES
SECTION OF HEMATOLOGY AND BONE MARROW TRANSPLANT  Return Patient Visit   01/17/2018  Referred by:  No ref. provider found  Referred for:  MM, autoSCT    CHIEF COMPLAINT:   No chief complaint on file.      HISTORY OF PRESENT ILLNESS:   76 yo male with pmh as below, all well controlled on current medications; diagnosis of systemic myeloma, cytogenetic studies normal.  Initially presentedfor cataract surgery.  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  Patient subsequently completed 23 planned fractions of XRT + dex with rad onc in [place].     Both have resulted in improvement in proptosis and vision.  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  PET scan showed known orbit lesion as well as multiple other sites of  Bone disease.    He has completed 9 cycles of VRd and given CR on restaging, age, patient preference, decision was made not to proceed with transplant.  He was transitioned to revlimid maintenance may 2017 and has been tolerating well.   Due to cytopenias we transitioned to a 21 day regimen of a 28 day cycle.  Continued mild left eye irritiation but overall doing well. Comes to clinic with his wife.    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 He has never used smokeless tobacco. He reports that he drinks about 4.2 oz of alcohol per week . He reports that he does not use drugs.    FAMILY  HISTORY:  Family History   Problem Relation Age of Onset    Cancer Mother     Prostate cancer Father     Coronary artery disease Father     Dementia Sister     Stroke Brother     Dementia Brother     No Known Problems Brother     Dementia Brother     No Known Problems Brother     Pancreatitis Brother     Cancer Brother     No Known Problems Sister     No Known Problems Sister        CURRENT MEDICATIONS:   Current Outpatient Prescriptions   Medication Sig    acyclovir (ZOVIRAX) 400 MG tablet Take 1 tablet (400 mg total) by mouth 2 (two) times daily.    alfuzosin (UROXATRAL) 10 mg Tb24     aspirin (ECOTRIN) 81 MG EC tablet Take 81 mg by mouth once daily.    carvedilol (COREG) 6.25 MG tablet     cephALEXin (KEFLEX) 500 MG capsule Take 500 mg by mouth every 12 (twelve) hours.    COMBIGAN 0.2-0.5 % Drop     cycloSPORINE (RESTASIS) 0.05 % ophthalmic emulsion Place 0.4 mLs (1 drop total) into both eyes 2 (two) times daily.    dexamethasone (DECADRON) 4 MG Tab     DUREZOL 0.05 % Drop ophthalmic solution     hydrocortisone 1 % cream     ILEVRO 0.3 % DrpS INSTILL ONE DROP INTO LEFT EYE ONCE DAILY    ketoconazole (NIZORAL) 2 % shampoo     latanoprost 0.005 % ophthalmic solution     lenalidomide (REVLIMID) 10 mg Cap Take 10 mg by mouth once daily. Day 1-21 of 28 day cycle.[employer] auth#[id number] on 12/21/17    levothyroxine (SYNTHROID) 125 MCG tablet     levothyroxine (SYNTHROID) 125 MCG tablet     lifitegrast 5 % Dpet Place 1 drop into the right eye 2 (two) times daily.    multivitamin with minerals tablet Take 1 tablet by mouth once daily.    naproxen (NAPROSYN) 500 MG tablet     neomycin-polymyxin-hydrocortisone (CORTISPORIN) otic solution     omeprazole (PRILOSEC) 40 MG capsule     oxycodone-acetaminophen (ROXICET) 5-325 mg per tablet Take 1 tablet by mouth every 6 (six) hours as needed for Pain.    RESTASIS 0.05 % ophthalmic emulsion     RESTASIS MULTIDOSE 0.05 % Drop     rosuvastatin  (CRESTOR) 20 MG tablet Take 20 mg by mouth.    tobramycin-dexamethasone 0.3-0.1% (TOBRADEX) 0.3-0.1 % DrpS Place 1 drop into the right eye 4 (four) times daily.    lenalidomide (REVLIMID) 5 mg Cap Take 5 mg by mouth once daily.     No current facility-administered medications for this visit.      ALLERGIES:   Review of patient's allergies indicates:   Allergen Reactions    Decongest tabs      All over the counter medications containing decongestive.              REVIEW OF SYSTEMS:   General ROS: negative  Psychological ROS: negative  Ophthalmic ROS: see HPI  ENT ROS: + right eye blurry vision   Allergy and Immunology ROS: negative  Hematological and Lymphatic ROS: negative  Endocrine ROS: negative  Respiratory ROS: negative  Cardiovascular ROS: negative  Gastrointestinal ROS: negative  Genito-Urinary ROS: negative  Musculoskeletal ROS: negative  Neurological ROS: negative  Dermatological ROS: negative    PHYSICAL EXAM:   Vitals:    01/15/18 1404   BP: 133/63   Pulse: (!) 59   Resp: 16   Temp: 97.5 °F (36.4 °C)       General - well developed, well nourished, no apparent distress  Head & Face - no sinus tenderness  Eyes - right eye appears red and irritated  ENT - normal external auditory canals and tympanic membranes bilaterally oropharynx clear,  Normal dentition and gums  Neck - normal thyroid  Chest and Lung - normal respiratory effort, clear to auscultation bilaterally   Cardiovascular - RRR with no MGR, normal S1 and S2; no pedal edema  Abdomen -  soft, nontender, no palpable hepatomegaly or splenomegaly  Lymph - no palpable lymphadenopathy  Extremities - unremarkable nails and digits  Heme - no bruising, petechiae, pallor  Skin - no rashes or lesions  Psych - appropriate mood and affect      ECOG Performance Status: (foot note - ECOG PS provided by Eastern Cooperative Oncology Group) 1 - Symptomatic but completely ambulatory    Karnofsky Performance Score:  90%- Able to Carry on Normal Activity: Minor  Symptoms of Disease  DATA: .  Lab Results   Component Value Date    WBC 2.63 (L) 01/15/2018    HGB 13.7 (L) 01/15/2018    HCT 41.9 01/15/2018    MCV 92 01/15/2018    PLT 86 (L) 01/15/2018     Gran #   Date Value Ref Range Status   01/15/2018 1.2 (L) 1.8 - 7.7 K/uL Final     Gran%   Date Value Ref Range Status   01/15/2018 44.0 38.0 - 73.0 % Final     Lymph #   Date Value Ref Range Status   01/15/2018 0.9 (L) 1.0 - 4.8 K/uL Final     Lymph%   Date Value Ref Range Status   01/15/2018 32.3 18.0 - 48.0 % Final     CMP  Sodium   Date Value Ref Range Status   01/15/2018 139 136 - 145 mmol/L Final     Potassium   Date Value Ref Range Status   01/15/2018 4.9 3.5 - 5.1 mmol/L Final     Chloride   Date Value Ref Range Status   01/15/2018 105 95 - 110 mmol/L Final     CO2   Date Value Ref Range Status   01/15/2018 28 23 - 29 mmol/L Final     Glucose   Date Value Ref Range Status   01/15/2018 97 70 - 110 mg/dL Final     BUN, Bld   Date Value Ref Range Status   01/15/2018 28 (H) 8 - 23 mg/dL Final     Creatinine   Date Value Ref Range Status   01/15/2018 1.1 0.5 - 1.4 mg/dL Final     Calcium   Date Value Ref Range Status   01/15/2018 9.4 8.7 - 10.5 mg/dL Final     Total Protein   Date Value Ref Range Status   01/15/2018 6.3 6.0 - 8.4 g/dL Final     Albumin   Date Value Ref Range Status   01/15/2018 3.5 3.5 - 5.2 g/dL Final     Total Bilirubin   Date Value Ref Range Status   01/15/2018 0.7 0.1 - 1.0 mg/dL Final     Comment:     For infants and newborns, interpretation of results should be based  on gestational age, weight and in agreement with clinical  observations.  Premature Infant recommended reference ranges:  Up to 24 hours.............<8.0 mg/dL  Up to 48 hours............<12.0 mg/dL  3-5 days..................<15.0 mg/dL  6-29 days.................<15.0 mg/dL       Alkaline Phosphatase   Date Value Ref Range Status   01/15/2018 52 (L) 55 - 135 U/L Final     AST   Date Value Ref Range Status   01/15/2018 14 10 - 40 U/L  Final     ALT   Date Value Ref Range Status   01/15/2018 20 10 - 44 U/L Final     Anion Gap   Date Value Ref Range Status   01/15/2018 6 (L) 8 - 16 mmol/L Final     eGFR if    Date Value Ref Range Status   01/15/2018 >60.0 >60 mL/min/1.73 m^2 Final     eGFR if non    Date Value Ref Range Status   01/15/2018 >60.0 >60 mL/min/1.73 m^2 Final     Comment:     Calculation used to obtain the estimated glomerular filtration  rate (eGFR) is the CKD-EPI equation.        IgG - Serum   Date Value Ref Range Status   01/15/2018 835 650 - 1600 mg/dL Final     Comment:     IgG Cord Blood Reference Range: 650-1600 mg/dL.     IgA   Date Value Ref Range Status   01/15/2018 128 40 - 350 mg/dL Final     Comment:     IgA Cord Blood Reference Range: <5 mg/dL.     IgM   Date Value Ref Range Status   01/15/2018 43 (L) 50 - 300 mg/dL Final     Comment:     IgM Cord Blood Reference Range: <25 mg/dL.     Kappa Free Light Chains   Date Value Ref Range Status   01/15/2018 3.17 (H) 0.33 - 1.94 mg/dL Final   12/15/2017 2.55 (H) 0.33 - 1.94 mg/dL Final   11/15/2017 2.29 (H) 0.33 - 1.94 mg/dL Final     Lambda Free Light Chains   Date Value Ref Range Status   01/15/2018 2.25 0.57 - 2.63 mg/dL Final   12/15/2017 2.17 0.57 - 2.63 mg/dL Final   11/15/2017 1.98 0.57 - 2.63 mg/dL Final     Kappa/Lambda FLC Ratio   Date Value Ref Range Status   01/15/2018 1.41 0.26 - 1.65 Final   12/15/2017 1.18 0.26 - 1.65 Final   11/15/2017 1.16 0.26 - 1.65 Final      MRI orbit - 4/171/17  Narrative     Comparison CT 2016 and MRI 03/02/2017 studies.  MRI brain face and orbits without and with gadolinium 7 cc intravenous.    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      No new intraosseous or intracranial lesions.  The vertebral, basilar common internal carotid arteries and major branches remain patent.  Few tiny foci  hyperattenuation posterior frontal high  convexity subcortical deep white matter unchanged.  Central and cortical atrophy appropriate for age.    Mucous retention cyst right maxillary sinus, post cataract surgery on right stable.  Mucosal hypertrophy of the ethmoid and frontal sinuses unchanged.   Impression      1. Right sphenoid wing intraosseous lesion attributed to plasmacytoma is stable, allowing for differences in technique and positioning.    2.  Brain stable with few tiny areas of focal remote gliosis.       PLEASE SEE EPIC MEDIA TAB FOR ALL IMAGING, PATHOLOGY REPORTS FROM OSH  ASSESSMENT AND PLAN:   Encounter Diagnoses   Name Primary?   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   -completed  9 cycles of VRd and achieved a IMWG CR on restaging pet, bone marrow biopsy, and biochemical studies may 2017 staging; transitioned to revlimid maintenance 10mg daily may 2017; remains in remission as of today's dec 2017 biochemical studies   -repeat SS today for baseline plain film imaging; no symptoms or labs to suggest these are active lesions   -due to persistent cytopenias despite transition to 21 day cycle will decrease revlimid to 5mg day 1-21 of 28 day cycle (1/15/17)  -continue fu with local opthalmology for eye irritation; suspect residual effect from radiation and not related to myeloma   -supportive acyclovir for  6 months from last velcade dose (Address discontinuation at next appt) , asa, bisphosphanate q 3 months x 1 year; dose 1 of 4 today (1/15/17); next due 4/15/18  -to have colonscopy to have cecal abnormality; will inquire if he had this at next appt       Follow Up: -cbc, cmp, serum free light chains, quantitative immunoglobulins, serum electropheresis, serum immunofixation lab only at [facility] in 1 month  -same labs, md appt, skeletal survey/long bone survey,    and chair for zometa infusion  at [facility] in 2 months     Bernardo Park MD  Hematology/Oncology/Bone Marrow Transplant

## 2018-01-16 ENCOUNTER — TELEPHONE (OUTPATIENT)
Dept: HEMATOLOGY/ONCOLOGY | Facility: CLINIC | Age: 78
End: 2018-01-16

## 2018-01-16 NOTE — TELEPHONE ENCOUNTER
----- Message from Warren Park MD sent at 1/16/2018  4:09 PM CST -----  Pt is not on my [facility]. Can you please let him know recent labs show myeloma still in remission.   Thank you   ----- Message -----  From: [facility]  Sent: 1/15/2018  12:32 PM  To: Warren Park MD      Spoke with pt at 415pm on 01/16/18, explained that his recent labs show his myeloma is still in remission, pt verbalized understanding.   Marina

## 2018-01-24 ENCOUNTER — TELEPHONE (OUTPATIENT)
Dept: HEMATOLOGY/ONCOLOGY | Facility: CLINIC | Age: 78
End: 2018-01-24

## 2018-01-24 NOTE — TELEPHONE ENCOUNTER
----- Message from Va Gaitan sent at 1/23/2018  2:29 PM CST -----  Contact: Pt wife  Pt wife calling to find out if documents were received regarding a malika approval      [employer] call back number [phone] or [phone]    Spoke with wife and explained that Dr. Park has signed the papers and they were faxed, wife verbalized understanding.  Marina

## 2018-01-24 NOTE — TELEPHONE ENCOUNTER
----- Message from Va Gaitan sent at 1/24/2018  8:45 AM CST -----  Contact: Pt daughter   Pt daughter calling to get information and directions on how to move forward with using malika to help pay for pt treatment          [employer] call back number [phone]    Spoke with wife and explained that Dr. Park has signed the papers and they were faxed, wife verbalized understanding.  Marina

## 2018-02-07 DIAGNOSIS — C90.01 MULTIPLE MYELOMA IN REMISSION: ICD-10-CM

## 2018-02-07 RX ORDER — LENALIDOMIDE 5 MG/1
5 CAPSULE ORAL DAILY
Qty: 28 EACH | Refills: 0 | Status: SHIPPED | OUTPATIENT
Start: 2018-02-07 | End: 2018-03-07 | Stop reason: SDUPTHER

## 2018-02-16 ENCOUNTER — LAB VISIT (OUTPATIENT)
Dept: LAB | Facility: HOSPITAL | Age: 78
End: 2018-02-16
Attending: INTERNAL MEDICINE
Payer: MEDICARE

## 2018-02-16 DIAGNOSIS — C90.01 MULTIPLE MYELOMA IN REMISSION: ICD-10-CM

## 2018-02-16 LAB
ALBUMIN SERPL BCP-MCNC: 3.8 G/DL
ALP SERPL-CCNC: 48 U/L
ALT SERPL W/O P-5'-P-CCNC: 23 U/L
ANION GAP SERPL CALC-SCNC: 6 MMOL/L
AST SERPL-CCNC: 18 U/L
BASOPHILS NFR BLD: 0 %
BILIRUB SERPL-MCNC: 0.9 MG/DL
BUN SERPL-MCNC: 21 MG/DL
CALCIUM SERPL-MCNC: 8.9 MG/DL
CHLORIDE SERPL-SCNC: 106 MMOL/L
CO2 SERPL-SCNC: 25 MMOL/L
CREAT SERPL-MCNC: 1.1 MG/DL
DIFFERENTIAL METHOD: ABNORMAL
EOSINOPHIL NFR BLD: 2 %
ERYTHROCYTE [DISTWIDTH] IN BLOOD BY AUTOMATED COUNT: 13.8 %
EST. GFR  (AFRICAN AMERICAN): >60 ML/MIN/1.73 M^2
EST. GFR  (NON AFRICAN AMERICAN): >60 ML/MIN/1.73 M^2
GLUCOSE SERPL-MCNC: 103 MG/DL
HCT VFR BLD AUTO: 41.1 %
HGB BLD-MCNC: 13.6 G/DL
IGA SERPL-MCNC: 138 MG/DL
IGG SERPL-MCNC: 820 MG/DL
IGM SERPL-MCNC: 36 MG/DL
LYMPHOCYTES NFR BLD: 55 %
MCH RBC QN AUTO: 30 PG
MCHC RBC AUTO-ENTMCNC: 33.1 G/DL
MCV RBC AUTO: 91 FL
MONOCYTES NFR BLD: 12 %
NEUTROPHILS NFR BLD: 29 %
NEUTS BAND NFR BLD MANUAL: 2 %
PLATELET # BLD AUTO: 87 K/UL
PMV BLD AUTO: 11 FL
POTASSIUM SERPL-SCNC: 4.1 MMOL/L
PROT SERPL-MCNC: 6.2 G/DL
RBC # BLD AUTO: 4.54 M/UL
SODIUM SERPL-SCNC: 137 MMOL/L
WBC # BLD AUTO: 2.16 K/UL

## 2018-02-16 PROCEDURE — 36415 COLL VENOUS BLD VENIPUNCTURE: CPT

## 2018-02-16 PROCEDURE — 86334 IMMUNOFIX E-PHORESIS SERUM: CPT | Mod: 26,,, | Performed by: PATHOLOGY

## 2018-02-16 PROCEDURE — 85027 COMPLETE CBC AUTOMATED: CPT

## 2018-02-16 PROCEDURE — 80053 COMPREHEN METABOLIC PANEL: CPT

## 2018-02-16 PROCEDURE — 86334 IMMUNOFIX E-PHORESIS SERUM: CPT

## 2018-02-16 PROCEDURE — 84165 PROTEIN E-PHORESIS SERUM: CPT

## 2018-02-16 PROCEDURE — 83520 IMMUNOASSAY QUANT NOS NONAB: CPT | Mod: 59

## 2018-02-16 PROCEDURE — 82784 ASSAY IGA/IGD/IGG/IGM EACH: CPT | Mod: 59

## 2018-02-16 PROCEDURE — 84165 PROTEIN E-PHORESIS SERUM: CPT | Mod: 26,,, | Performed by: PATHOLOGY

## 2018-02-16 PROCEDURE — 85007 BL SMEAR W/DIFF WBC COUNT: CPT

## 2018-02-19 LAB
ALBUMIN SERPL ELPH-MCNC: 3.75 G/DL
ALPHA1 GLOB SERPL ELPH-MCNC: 0.24 G/DL
ALPHA2 GLOB SERPL ELPH-MCNC: 0.58 G/DL
B-GLOBULIN SERPL ELPH-MCNC: 0.59 G/DL
GAMMA GLOB SERPL ELPH-MCNC: 0.85 G/DL
INTERPRETATION SERPL IFE-IMP: NORMAL
KAPPA LC SER QL IA: 3.47 MG/DL
KAPPA LC/LAMBDA SER IA: 1.54
LAMBDA LC SER QL IA: 2.26 MG/DL
PATHOLOGIST INTERPRETATION IFE: NORMAL
PATHOLOGIST INTERPRETATION SPE: NORMAL
PROT SERPL-MCNC: 6 G/DL

## 2018-02-22 ENCOUNTER — TELEPHONE (OUTPATIENT)
Dept: HEMATOLOGY/ONCOLOGY | Facility: CLINIC | Age: 78
End: 2018-02-22

## 2018-02-22 NOTE — TELEPHONE ENCOUNTER
----- Message from Warren Park MD sent at 2/21/2018  6:55 PM CST -----  Pt not on my [facility] .  Can you please call them to let them know Labs show continued remission of your myeloma. Good news.     ----- Message -----  From: [facility]  Sent: 2/16/2018  12:16 PM  To: Warren Park MD  Spoke with pt at 821am on 02/22/18, explained labs show continued remission of his myeloma, pt verbalized understanding.  Marina

## 2018-03-07 DIAGNOSIS — C90.01 MULTIPLE MYELOMA IN REMISSION: ICD-10-CM

## 2018-03-07 RX ORDER — LENALIDOMIDE 5 MG/1
1 CAPSULE ORAL DAILY
Qty: 21 EACH | Refills: 0 | Status: SHIPPED | OUTPATIENT
Start: 2018-03-07 | End: 2018-03-28 | Stop reason: SDUPTHER

## 2018-03-12 ENCOUNTER — TELEPHONE (OUTPATIENT)
Dept: HEMATOLOGY/ONCOLOGY | Facility: CLINIC | Age: 78
End: 2018-03-12

## 2018-03-12 NOTE — TELEPHONE ENCOUNTER
----- Message from Va Gaitan sent at 3/9/2018  3:35 PM CST -----  Contact: Brianna with [employer]  Brianna calling regarding Revlimid        Brianna call back number [phone] Ref# [id number]  Spoke with [employer], all questions were answered, will now process Revlimid prescription.  Marina

## 2018-03-19 ENCOUNTER — OFFICE VISIT (OUTPATIENT)
Dept: HEMATOLOGY/ONCOLOGY | Facility: CLINIC | Age: 78
End: 2018-03-19
Payer: MEDICARE

## 2018-03-19 ENCOUNTER — LAB VISIT (OUTPATIENT)
Dept: LAB | Facility: HOSPITAL | Age: 78
End: 2018-03-19
Attending: INTERNAL MEDICINE
Payer: MEDICARE

## 2018-03-19 VITALS
HEART RATE: 56 BPM | OXYGEN SATURATION: 98 % | RESPIRATION RATE: 17 BRPM | HEIGHT: 70 IN | WEIGHT: 170 LBS | DIASTOLIC BLOOD PRESSURE: 64 MMHG | BODY MASS INDEX: 24.34 KG/M2 | TEMPERATURE: 98 F | SYSTOLIC BLOOD PRESSURE: 136 MMHG

## 2018-03-19 DIAGNOSIS — C90.01 MULTIPLE MYELOMA IN REMISSION: Primary | ICD-10-CM

## 2018-03-19 DIAGNOSIS — C90.01 MULTIPLE MYELOMA IN REMISSION: ICD-10-CM

## 2018-03-19 LAB
ALBUMIN SERPL BCP-MCNC: 3.7 G/DL
ALP SERPL-CCNC: 48 U/L
ALT SERPL W/O P-5'-P-CCNC: 24 U/L
ANION GAP SERPL CALC-SCNC: 6 MMOL/L
ANISOCYTOSIS BLD QL SMEAR: SLIGHT
AST SERPL-CCNC: 22 U/L
BASOPHILS # BLD AUTO: 0.01 K/UL
BASOPHILS NFR BLD: 0.5 %
BILIRUB SERPL-MCNC: 0.8 MG/DL
BUN SERPL-MCNC: 24 MG/DL
CALCIUM SERPL-MCNC: 9.2 MG/DL
CHLORIDE SERPL-SCNC: 104 MMOL/L
CO2 SERPL-SCNC: 26 MMOL/L
CREAT SERPL-MCNC: 1.1 MG/DL
DACRYOCYTES BLD QL SMEAR: ABNORMAL
DIFFERENTIAL METHOD: ABNORMAL
EOSINOPHIL # BLD AUTO: 0.1 K/UL
EOSINOPHIL NFR BLD: 3 %
ERYTHROCYTE [DISTWIDTH] IN BLOOD BY AUTOMATED COUNT: 13.3 %
EST. GFR  (AFRICAN AMERICAN): >60 ML/MIN/1.73 M^2
EST. GFR  (NON AFRICAN AMERICAN): >60 ML/MIN/1.73 M^2
GLUCOSE SERPL-MCNC: 126 MG/DL
HCT VFR BLD AUTO: 42.5 %
HGB BLD-MCNC: 13.7 G/DL
HYPOCHROMIA BLD QL SMEAR: ABNORMAL
IGA SERPL-MCNC: 130 MG/DL
IGG SERPL-MCNC: 865 MG/DL
IGM SERPL-MCNC: 37 MG/DL
IMM GRANULOCYTES # BLD AUTO: 0.02 K/UL
IMM GRANULOCYTES NFR BLD AUTO: 1 %
LYMPHOCYTES # BLD AUTO: 0.7 K/UL
LYMPHOCYTES NFR BLD: 33.3 %
MCH RBC QN AUTO: 29.6 PG
MCHC RBC AUTO-ENTMCNC: 32.2 G/DL
MCV RBC AUTO: 92 FL
MONOCYTES # BLD AUTO: 0.4 K/UL
MONOCYTES NFR BLD: 18.9 %
NEUTROPHILS # BLD AUTO: 0.9 K/UL
NEUTROPHILS NFR BLD: 43.3 %
NRBC BLD-RTO: 0 /100 WBC
OVALOCYTES BLD QL SMEAR: ABNORMAL
PLATELET # BLD AUTO: 97 K/UL
PMV BLD AUTO: 10.6 FL
POIKILOCYTOSIS BLD QL SMEAR: SLIGHT
POLYCHROMASIA BLD QL SMEAR: ABNORMAL
POTASSIUM SERPL-SCNC: 4.4 MMOL/L
PROT SERPL-MCNC: 6.2 G/DL
RBC # BLD AUTO: 4.63 M/UL
SODIUM SERPL-SCNC: 136 MMOL/L
WBC # BLD AUTO: 2.01 K/UL

## 2018-03-19 PROCEDURE — 99215 OFFICE O/P EST HI 40 MIN: CPT | Mod: PBBFAC | Performed by: INTERNAL MEDICINE

## 2018-03-19 PROCEDURE — 80053 COMPREHEN METABOLIC PANEL: CPT

## 2018-03-19 PROCEDURE — 86334 IMMUNOFIX E-PHORESIS SERUM: CPT | Mod: 26,,, | Performed by: PATHOLOGY

## 2018-03-19 PROCEDURE — 82784 ASSAY IGA/IGD/IGG/IGM EACH: CPT | Mod: 59

## 2018-03-19 PROCEDURE — 84165 PROTEIN E-PHORESIS SERUM: CPT

## 2018-03-19 PROCEDURE — 85025 COMPLETE CBC W/AUTO DIFF WBC: CPT

## 2018-03-19 PROCEDURE — 36415 COLL VENOUS BLD VENIPUNCTURE: CPT

## 2018-03-19 PROCEDURE — 99999 PR PBB SHADOW E&M-EST. PATIENT-LVL V: CPT | Mod: PBBFAC,,, | Performed by: INTERNAL MEDICINE

## 2018-03-19 PROCEDURE — 83520 IMMUNOASSAY QUANT NOS NONAB: CPT | Mod: 59

## 2018-03-19 PROCEDURE — 99214 OFFICE O/P EST MOD 30 MIN: CPT | Mod: S$PBB,,, | Performed by: INTERNAL MEDICINE

## 2018-03-19 PROCEDURE — 86334 IMMUNOFIX E-PHORESIS SERUM: CPT

## 2018-03-19 PROCEDURE — 84165 PROTEIN E-PHORESIS SERUM: CPT | Mod: 26,,, | Performed by: PATHOLOGY

## 2018-03-19 RX ORDER — TIMOLOL MALEATE 5 MG/ML
SOLUTION/ DROPS OPHTHALMIC
COMMUNITY
Start: 2018-03-13

## 2018-03-19 RX ORDER — HEPARIN 100 UNIT/ML
500 SYRINGE INTRAVENOUS
Status: CANCELLED | OUTPATIENT
Start: 2018-03-19

## 2018-03-19 RX ORDER — SODIUM CHLORIDE 0.9 % (FLUSH) 0.9 %
10 SYRINGE (ML) INJECTION
Status: CANCELLED | OUTPATIENT
Start: 2018-03-19

## 2018-03-19 NOTE — PROGRESS NOTES
SECTION OF HEMATOLOGY AND BONE MARROW TRANSPLANT  Return Patient Visit   03/20/2018  Referred by:  No ref. provider found  Referred for:  MM, autoSCT    CHIEF COMPLAINT:   Chief Complaint   Patient presents with    Multiple Myeloma       HISTORY OF PRESENT ILLNESS:   78 yo male with pmh as below, all well controlled on current medications; diagnosis of systemic myeloma, cytogenetic studies normal.  Initially presentedfor cataract surgery.  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  Patient subsequently completed 23 planned fractions of XRT + dex with rad onc in [place].     Both have resulted in improvement in proptosis and vision.  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  PET scan showed known orbit lesion as well as multiple other sites of  Bone disease.    He has completed 9 cycles of VRd and given CR on restaging, age, patient preference, decision was made not to proceed with transplant.  He was transitioned to revlimid maintenance may 2017 and has been tolerating well.   Due to cytopenias we transitioned to 5mg  21 day regimen of a 28 day cycle.  Continued mild left eye irritiation but overall doing well.  Being treated for uti by pcp with keflex.  Comes to clinic with his wife.    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 He has never used smokeless tobacco. He reports that he drinks about  4.2 oz of alcohol per week . He reports that he does not use drugs.    FAMILY HISTORY:  Family History   Problem Relation Age of Onset    Cancer Mother     Prostate cancer Father     Coronary artery disease Father     Dementia Sister     Stroke Brother     Dementia Brother     No Known Problems Brother     Dementia Brother     No Known Problems Brother     Pancreatitis Brother     Cancer Brother     No Known Problems Sister     No Known Problems Sister        CURRENT MEDICATIONS:   Current Outpatient Prescriptions   Medication Sig    acyclovir (ZOVIRAX) 400 MG tablet Take 1 tablet (400 mg total) by mouth 2 (two) times daily.    alfuzosin (UROXATRAL) 10 mg Tb24     aspirin (ECOTRIN) 81 MG EC tablet Take 81 mg by mouth once daily.    carvedilol (COREG) 6.25 MG tablet     cephALEXin (KEFLEX) 500 MG capsule Take 500 mg by mouth every 12 (twelve) hours.    COMBIGAN 0.2-0.5 % Drop     cycloSPORINE (RESTASIS) 0.05 % ophthalmic emulsion Place 0.4 mLs (1 drop total) into both eyes 2 (two) times daily.    dexamethasone (DECADRON) 4 MG Tab     DUREZOL 0.05 % Drop ophthalmic solution     hydrocortisone 1 % cream     ILEVRO 0.3 % DrpS INSTILL ONE DROP INTO LEFT EYE ONCE DAILY    ketoconazole (NIZORAL) 2 % shampoo     latanoprost 0.005 % ophthalmic solution     lenalidomide (REVLIMID) 5 mg Cap Take 1 capsule by mouth once daily. Day 1-21 of 28 day cycle    levothyroxine (SYNTHROID) 125 MCG tablet     lifitegrast 5 % Dpet Place 1 drop into the right eye 2 (two) times daily.    multivitamin with minerals tablet Take 1 tablet by mouth once daily.    naproxen (NAPROSYN) 500 MG tablet     neomycin-polymyxin-hydrocortisone (CORTISPORIN) otic solution     omeprazole (PRILOSEC) 40 MG capsule     oxycodone-acetaminophen (ROXICET) 5-325 mg per tablet Take 1 tablet by mouth every 6 (six) hours as needed for Pain.    RESTASIS 0.05 % ophthalmic emulsion     rosuvastatin (CRESTOR) 20 MG tablet Take 20 mg by  mouth.    timolol maleate 0.5% (TIMOPTIC) 0.5 % Drop     tobramycin-dexamethasone 0.3-0.1% (TOBRADEX) 0.3-0.1 % DrpS Place 1 drop into the right eye 4 (four) times daily.     No current facility-administered medications for this visit.      ALLERGIES:   Review of patient's allergies indicates:   Allergen Reactions    Decongest tabs      All over the counter medications containing decongestive.              REVIEW OF SYSTEMS:   General ROS: negative  Psychological ROS: negative  Ophthalmic ROS: see HPI  ENT ROS: + right eye blurry vision   Allergy and Immunology ROS: negative  Hematological and Lymphatic ROS: negative  Endocrine ROS: negative  Respiratory ROS: negative  Cardiovascular ROS: negative  Gastrointestinal ROS: negative  Genito-Urinary ROS: negative  Musculoskeletal ROS: negative  Neurological ROS: negative  Dermatological ROS: negative    PHYSICAL EXAM:   Vitals:    03/19/18 1335   BP: 136/64   Pulse: (!) 56   Resp: 17   Temp: 97.6 °F (36.4 °C)       General - well developed, well nourished, no apparent distress  Head & Face - no sinus tenderness  Eyes - right eye appears red and irritated  ENT - normal external auditory canals and tympanic membranes bilaterally oropharynx clear,  Normal dentition and gums  Neck - normal thyroid  Chest and Lung - normal respiratory effort, clear to auscultation bilaterally   Cardiovascular - RRR with no MGR, normal S1 and S2; no pedal edema  Abdomen -  soft, nontender, no palpable hepatomegaly or splenomegaly  Lymph - no palpable lymphadenopathy  Extremities - unremarkable nails and digits  Heme - no bruising, petechiae, pallor  Skin - no rashes or lesions  Psych - appropriate mood and affect      ECOG Performance Status: (foot note - ECOG PS provided by Eastern Cooperative Oncology Group) 1 - Symptomatic but completely ambulatory    Karnofsky Performance Score:  90%- Able to Carry on Normal Activity: Minor Symptoms of Disease  DATA: .  Lab Results   Component Value  Date    WBC 2.01 (L) 03/19/2018    HGB 13.7 (L) 03/19/2018    HCT 42.5 03/19/2018    MCV 92 03/19/2018    PLT 97 (L) 03/19/2018     Gran # (ANC)   Date Value Ref Range Status   03/19/2018 0.9 (L) 1.8 - 7.7 K/uL Final     Gran%   Date Value Ref Range Status   03/19/2018 43.3 38.0 - 73.0 % Final     Lymph #   Date Value Ref Range Status   03/19/2018 0.7 (L) 1.0 - 4.8 K/uL Final     Lymph%   Date Value Ref Range Status   03/19/2018 33.3 18.0 - 48.0 % Final     CMP  Sodium   Date Value Ref Range Status   03/19/2018 136 136 - 145 mmol/L Final     Potassium   Date Value Ref Range Status   03/19/2018 4.4 3.5 - 5.1 mmol/L Final     Chloride   Date Value Ref Range Status   03/19/2018 104 95 - 110 mmol/L Final     CO2   Date Value Ref Range Status   03/19/2018 26 23 - 29 mmol/L Final     Glucose   Date Value Ref Range Status   03/19/2018 126 (H) 70 - 110 mg/dL Final     BUN, Bld   Date Value Ref Range Status   03/19/2018 24 (H) 8 - 23 mg/dL Final     Creatinine   Date Value Ref Range Status   03/19/2018 1.1 0.5 - 1.4 mg/dL Final     Calcium   Date Value Ref Range Status   03/19/2018 9.2 8.7 - 10.5 mg/dL Final     Total Protein   Date Value Ref Range Status   03/19/2018 6.2 6.0 - 8.4 g/dL Final     Albumin   Date Value Ref Range Status   03/19/2018 3.7 3.5 - 5.2 g/dL Final     Total Bilirubin   Date Value Ref Range Status   03/19/2018 0.8 0.1 - 1.0 mg/dL Final     Comment:     For infants and newborns, interpretation of results should be based  on gestational age, weight and in agreement with clinical  observations.  Premature Infant recommended reference ranges:  Up to 24 hours.............<8.0 mg/dL  Up to 48 hours............<12.0 mg/dL  3-5 days..................<15.0 mg/dL  6-29 days.................<15.0 mg/dL       Alkaline Phosphatase   Date Value Ref Range Status   03/19/2018 48 (L) 55 - 135 U/L Final     AST   Date Value Ref Range Status   03/19/2018 22 10 - 40 U/L Final     ALT   Date Value Ref Range Status    03/19/2018 24 10 - 44 U/L Final     Anion Gap   Date Value Ref Range Status   03/19/2018 6 (L) 8 - 16 mmol/L Final     eGFR if    Date Value Ref Range Status   03/19/2018 >60.0 >60 mL/min/1.73 m^2 Final     eGFR if non    Date Value Ref Range Status   03/19/2018 >60.0 >60 mL/min/1.73 m^2 Final     Comment:     Calculation used to obtain the estimated glomerular filtration  rate (eGFR) is the CKD-EPI equation.        IgG - Serum   Date Value Ref Range Status   03/19/2018 865 650 - 1600 mg/dL Final     Comment:     IgG Cord Blood Reference Range: 650-1600 mg/dL.     IgA   Date Value Ref Range Status   03/19/2018 130 40 - 350 mg/dL Final     Comment:     IgA Cord Blood Reference Range: <5 mg/dL.     IgM   Date Value Ref Range Status   03/19/2018 37 (L) 50 - 300 mg/dL Final     Comment:     IgM Cord Blood Reference Range: <25 mg/dL.     Kappa Free Light Chains   Date Value Ref Range Status   02/16/2018 3.47 (H) 0.33 - 1.94 mg/dL Final   01/15/2018 3.17 (H) 0.33 - 1.94 mg/dL Final   12/15/2017 2.55 (H) 0.33 - 1.94 mg/dL Final     Lambda Free Light Chains   Date Value Ref Range Status   02/16/2018 2.26 0.57 - 2.63 mg/dL Final   01/15/2018 2.25 0.57 - 2.63 mg/dL Final   12/15/2017 2.17 0.57 - 2.63 mg/dL Final     Kappa/Lambda FLC Ratio   Date Value Ref Range Status   02/16/2018 1.54 0.26 - 1.65 Final   01/15/2018 1.41 0.26 - 1.65 Final   12/15/2017 1.18 0.26 - 1.65 Final      MRI orbit - 4/171/17  Narrative     Comparison CT 2016 and MRI 03/02/2017 studies.  MRI brain face and orbits without and with gadolinium 7 cc intravenous.    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      No new intraosseous or intracranial lesions.  The vertebral, basilar common internal carotid arteries and major branches remain patent.  Few tiny foci  hyperattenuation posterior frontal high convexity subcortical deep white matter unchanged.   Central and cortical atrophy appropriate for age.    Mucous retention cyst right maxillary sinus, post cataract surgery on right stable.  Mucosal hypertrophy of the ethmoid and frontal sinuses unchanged.   Impression      1. Right sphenoid wing intraosseous lesion attributed to plasmacytoma is stable, allowing for differences in technique and positioning.    2.  Brain stable with few tiny areas of focal remote gliosis.       PLEASE SEE EPIC MEDIA TAB FOR ALL IMAGING, PATHOLOGY REPORTS FROM OSH  ASSESSMENT AND PLAN:   Encounter Diagnosis   Name Primary?   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   -completed  9 cycles of VRd and achieved a IMWG CR on restaging pet, bone marrow biopsy, and biochemical studies may 2017 staging; transitioned to revlimid maintenance 10mg daily may 2017; remains in remission as of today's dec 2017 biochemical studies   -repeat SS today for baseline plain film imaging; no symptoms or labs to suggest these are active lesions   -due to persistent cytopenias despite transitioned to 21 day cycle will decrease revlimid to 5mg day 1-21 of 28 day cycle (1/15/17); suspect mild leukopenia/neutropenia related to active UTI And keflex   -continue fu with local opthalmology for eye irritation; suspect residual effect from radiation and not related to myeloma   -supportive acyclovir for  6 months from last velcade dose can discontinue today'  , asa, bisphosphanate q 3 months x 1 year; dose 1 of 4  (1/15/17); next 2 of 4 due may 2018 (off schedule)   -to have colonscopy for  cecal abnormality on PET; forward note to pcp to make sure his age appropriate cancer screening up to date      Follow Up: -cbc, cmp, serum free light chains, quantitative immunoglobulins, serum electropheresis, serum immunofixation lab only at [facility] in 1 month  -same  labs, md app[place],   [place] [facility] [place]     Bernardo Park MD  Hematology/Oncology/Bone Marrow Transplant

## 2018-03-19 NOTE — PROGRESS NOTES
Keflex for uti   [facility]  Labs [place] 1 months  Labs., md appt, zometa in2 monthsh

## 2018-03-19 NOTE — Clinical Note
-cbc, cmp, serum free light chains, quantitative immunoglobulins, serum electropheresis, serum immunofixation lab only at [facility] in 1 month -same labs, md [place],   [place] [facility] [place]

## 2018-03-19 NOTE — Clinical Note
-cancel zometa infusion today  -cbc, cmp, serum free light chains, quantitative immunoglobulins, serum electropheresis, serum immunofixation at [facility] only in 1 month -same labs, md appt, chair for zometa in 2 months; 9-10 am appt per pt request

## 2018-03-20 ENCOUNTER — TELEPHONE (OUTPATIENT)
Dept: HEMATOLOGY/ONCOLOGY | Facility: CLINIC | Age: 78
End: 2018-03-20

## 2018-03-20 LAB
ALBUMIN SERPL ELPH-MCNC: 3.52 G/DL
ALPHA1 GLOB SERPL ELPH-MCNC: 0.23 G/DL
ALPHA2 GLOB SERPL ELPH-MCNC: 0.56 G/DL
B-GLOBULIN SERPL ELPH-MCNC: 0.58 G/DL
GAMMA GLOB SERPL ELPH-MCNC: 0.81 G/DL
INTERPRETATION SERPL IFE-IMP: NORMAL
KAPPA LC SER QL IA: 2.99 MG/DL
KAPPA LC/LAMBDA SER IA: 1.35
LAMBDA LC SER QL IA: 2.22 MG/DL
PATHOLOGIST INTERPRETATION IFE: NORMAL
PATHOLOGIST INTERPRETATION SPE: NORMAL
PROT SERPL-MCNC: 5.7 G/DL

## 2018-03-20 NOTE — TELEPHONE ENCOUNTER
----- Message from Duncan Cyr sent at 3/20/2018  9:29 AM CDT -----  Contact: Daughter  Pt will like a call from [facility] regarding name of medication    Pt Contact::[phone]   Spouse contact:: [phone]  Spoke with pt, the name of the medication is acyclovir, pt verbalized understanding.  Marina

## 2018-03-20 NOTE — TELEPHONE ENCOUNTER
----- Message from Warren Park MD sent at 3/20/2018  3:39 PM CDT -----  Pt not on my [facility]. Can you please let them know Labs show continued remission of your myeloma. Good news.     ----- Message -----  From: [facility]  Sent: 3/19/2018  12:28 PM  To: Warren Park MD    Spoke with pt's daughter, explained that Labs show continued remission of your myeloma per Dr. Park, pt's daughter verbalized understanding and will inform he father.  Marina

## 2018-03-20 NOTE — TELEPHONE ENCOUNTER
----- Message from Warren Park MD sent at 3/20/2018  6:20 AM CDT -----  Can you let pt know he can stop his acyclovir if he has not already.  Also can you update his pcp in epic? It has me listed and I cant forward messsages to his real pcp.   Thank you   Spoke with pt's daughter, explained that he can stop his acyclovir per Dr. Park, daughter verbalized understanding.  Marina

## 2018-03-28 DIAGNOSIS — C90.01 MULTIPLE MYELOMA IN REMISSION: ICD-10-CM

## 2018-03-28 RX ORDER — LENALIDOMIDE 5 MG/1
1 CAPSULE ORAL DAILY
Qty: 21 EACH | Refills: 0 | Status: SHIPPED | OUTPATIENT
Start: 2018-03-28 | End: 2018-04-18 | Stop reason: SDUPTHER

## 2018-04-10 ENCOUNTER — LAB VISIT (OUTPATIENT)
Dept: LAB | Facility: HOSPITAL | Age: 78
End: 2018-04-10
Attending: INTERNAL MEDICINE
Payer: MEDICARE

## 2018-04-10 DIAGNOSIS — C90.01 MULTIPLE MYELOMA IN REMISSION: ICD-10-CM

## 2018-04-10 LAB
ALBUMIN SERPL BCP-MCNC: 3.6 G/DL
ALP SERPL-CCNC: 40 U/L
ALT SERPL W/O P-5'-P-CCNC: 23 U/L
ANION GAP SERPL CALC-SCNC: 4 MMOL/L
AST SERPL-CCNC: 17 U/L
BASOPHILS # BLD AUTO: 0.02 K/UL
BASOPHILS NFR BLD: 0.6 %
BILIRUB SERPL-MCNC: 0.4 MG/DL
BUN SERPL-MCNC: 26 MG/DL
CALCIUM SERPL-MCNC: 9 MG/DL
CHLORIDE SERPL-SCNC: 106 MMOL/L
CO2 SERPL-SCNC: 28 MMOL/L
CREAT SERPL-MCNC: 1 MG/DL
DIFFERENTIAL METHOD: ABNORMAL
EOSINOPHIL # BLD AUTO: 0.1 K/UL
EOSINOPHIL NFR BLD: 2.1 %
ERYTHROCYTE [DISTWIDTH] IN BLOOD BY AUTOMATED COUNT: 13.9 %
EST. GFR  (AFRICAN AMERICAN): >60 ML/MIN/1.73 M^2
EST. GFR  (NON AFRICAN AMERICAN): >60 ML/MIN/1.73 M^2
GLUCOSE SERPL-MCNC: 109 MG/DL
HCT VFR BLD AUTO: 43 %
HGB BLD-MCNC: 14.1 G/DL
IGA SERPL-MCNC: 117 MG/DL
IGG SERPL-MCNC: 779 MG/DL
IGM SERPL-MCNC: 39 MG/DL
LYMPHOCYTES # BLD AUTO: 1 K/UL
LYMPHOCYTES NFR BLD: 31.5 %
MCH RBC QN AUTO: 29.4 PG
MCHC RBC AUTO-ENTMCNC: 32.8 G/DL
MCV RBC AUTO: 90 FL
MONOCYTES # BLD AUTO: 0.5 K/UL
MONOCYTES NFR BLD: 15.3 %
NEUTROPHILS # BLD AUTO: 1.7 K/UL
NEUTROPHILS NFR BLD: 50.5 %
PLATELET # BLD AUTO: 106 K/UL
PMV BLD AUTO: 10.8 FL
POTASSIUM SERPL-SCNC: 4.7 MMOL/L
PROT SERPL-MCNC: 6.1 G/DL
RBC # BLD AUTO: 4.8 M/UL
SODIUM SERPL-SCNC: 138 MMOL/L
WBC # BLD AUTO: 3.27 K/UL

## 2018-04-10 PROCEDURE — 86334 IMMUNOFIX E-PHORESIS SERUM: CPT

## 2018-04-10 PROCEDURE — 84165 PROTEIN E-PHORESIS SERUM: CPT

## 2018-04-10 PROCEDURE — 85025 COMPLETE CBC W/AUTO DIFF WBC: CPT

## 2018-04-10 PROCEDURE — 36415 COLL VENOUS BLD VENIPUNCTURE: CPT

## 2018-04-10 PROCEDURE — 86334 IMMUNOFIX E-PHORESIS SERUM: CPT | Mod: 26,,, | Performed by: PATHOLOGY

## 2018-04-10 PROCEDURE — 82784 ASSAY IGA/IGD/IGG/IGM EACH: CPT | Mod: 59

## 2018-04-10 PROCEDURE — 80053 COMPREHEN METABOLIC PANEL: CPT

## 2018-04-10 PROCEDURE — 83520 IMMUNOASSAY QUANT NOS NONAB: CPT | Mod: 59

## 2018-04-10 PROCEDURE — 84165 PROTEIN E-PHORESIS SERUM: CPT | Mod: 26,,, | Performed by: PATHOLOGY

## 2018-04-11 ENCOUNTER — TELEPHONE (OUTPATIENT)
Dept: HEMATOLOGY/ONCOLOGY | Facility: CLINIC | Age: 78
End: 2018-04-11

## 2018-04-11 LAB
ALBUMIN SERPL ELPH-MCNC: 3.57 G/DL
ALPHA1 GLOB SERPL ELPH-MCNC: 0.25 G/DL
ALPHA2 GLOB SERPL ELPH-MCNC: 0.59 G/DL
B-GLOBULIN SERPL ELPH-MCNC: 0.57 G/DL
GAMMA GLOB SERPL ELPH-MCNC: 0.72 G/DL
INTERPRETATION SERPL IFE-IMP: NORMAL
KAPPA LC SER QL IA: 1.94 MG/DL
KAPPA LC/LAMBDA SER IA: 1.17
LAMBDA LC SER QL IA: 1.66 MG/DL
PATHOLOGIST INTERPRETATION IFE: NORMAL
PATHOLOGIST INTERPRETATION SPE: NORMAL
PROT SERPL-MCNC: 5.7 G/DL

## 2018-04-11 NOTE — TELEPHONE ENCOUNTER
----- Message from Warren aPrk MD sent at 4/11/2018  4:00 PM CDT -----  Can you please call pt and let him know his recent Labs show continued remission of his  myeloma. Good news.  Thank you       ----- Message -----  From: [facility]  Sent: 4/10/2018  10:34 AM  To: Warren Park MD  Spoke with pt, explained that his recent Labs show continued remission of his  myeloma per Dr. Park, pt verbalized understanding.  Marina

## 2018-04-18 DIAGNOSIS — C90.01 MULTIPLE MYELOMA IN REMISSION: ICD-10-CM

## 2018-04-18 RX ORDER — LENALIDOMIDE 5 MG/1
1 CAPSULE ORAL DAILY
Qty: 21 EACH | Refills: 0 | Status: SHIPPED | OUTPATIENT
Start: 2018-04-18 | End: 2018-05-11 | Stop reason: SDUPTHER

## 2018-05-11 DIAGNOSIS — C90.01 MULTIPLE MYELOMA IN REMISSION: ICD-10-CM

## 2018-05-11 RX ORDER — LENALIDOMIDE 5 MG/1
1 CAPSULE ORAL DAILY
Qty: 21 EACH | Refills: 0 | Status: SHIPPED | OUTPATIENT
Start: 2018-05-11 | End: 2018-06-01

## 2018-05-14 ENCOUNTER — INFUSION (OUTPATIENT)
Dept: INFUSION THERAPY | Facility: HOSPITAL | Age: 78
End: 2018-05-14
Attending: INTERNAL MEDICINE
Payer: MEDICARE

## 2018-05-14 ENCOUNTER — OFFICE VISIT (OUTPATIENT)
Dept: HEMATOLOGY/ONCOLOGY | Facility: CLINIC | Age: 78
End: 2018-05-14
Payer: MEDICARE

## 2018-05-14 ENCOUNTER — LAB VISIT (OUTPATIENT)
Dept: LAB | Facility: HOSPITAL | Age: 78
End: 2018-05-14
Attending: INTERNAL MEDICINE
Payer: MEDICARE

## 2018-05-14 VITALS
DIASTOLIC BLOOD PRESSURE: 61 MMHG | RESPIRATION RATE: 18 BRPM | HEART RATE: 56 BPM | SYSTOLIC BLOOD PRESSURE: 130 MMHG | WEIGHT: 165.56 LBS | HEIGHT: 70 IN | TEMPERATURE: 98 F | OXYGEN SATURATION: 98 % | BODY MASS INDEX: 23.7 KG/M2

## 2018-05-14 VITALS
DIASTOLIC BLOOD PRESSURE: 70 MMHG | RESPIRATION RATE: 17 BRPM | BODY MASS INDEX: 23.62 KG/M2 | HEIGHT: 70 IN | WEIGHT: 165 LBS | SYSTOLIC BLOOD PRESSURE: 150 MMHG | HEART RATE: 55 BPM | TEMPERATURE: 97 F

## 2018-05-14 DIAGNOSIS — D75.9 CYTOPENIA: ICD-10-CM

## 2018-05-14 DIAGNOSIS — C90.01 MULTIPLE MYELOMA IN REMISSION: ICD-10-CM

## 2018-05-14 DIAGNOSIS — C90.01 MULTIPLE MYELOMA IN REMISSION: Primary | ICD-10-CM

## 2018-05-14 DIAGNOSIS — R19.00 PALPABLE ABDOMINAL MASS: ICD-10-CM

## 2018-05-14 LAB
ALBUMIN SERPL BCP-MCNC: 3.6 G/DL
ALP SERPL-CCNC: 43 U/L
ALT SERPL W/O P-5'-P-CCNC: 16 U/L
ANION GAP SERPL CALC-SCNC: 5 MMOL/L
AST SERPL-CCNC: 15 U/L
BASOPHILS # BLD AUTO: 0.02 K/UL
BASOPHILS NFR BLD: 0.8 %
BILIRUB SERPL-MCNC: 0.8 MG/DL
BUN SERPL-MCNC: 21 MG/DL
CALCIUM SERPL-MCNC: 9.2 MG/DL
CHLORIDE SERPL-SCNC: 105 MMOL/L
CO2 SERPL-SCNC: 26 MMOL/L
CREAT SERPL-MCNC: 1 MG/DL
DIFFERENTIAL METHOD: ABNORMAL
EOSINOPHIL # BLD AUTO: 0.1 K/UL
EOSINOPHIL NFR BLD: 3.1 %
ERYTHROCYTE [DISTWIDTH] IN BLOOD BY AUTOMATED COUNT: 13.9 %
EST. GFR  (AFRICAN AMERICAN): >60 ML/MIN/1.73 M^2
EST. GFR  (NON AFRICAN AMERICAN): >60 ML/MIN/1.73 M^2
GLUCOSE SERPL-MCNC: 100 MG/DL
HCT VFR BLD AUTO: 42.4 %
HGB BLD-MCNC: 13.9 G/DL
IGA SERPL-MCNC: 112 MG/DL
IGG SERPL-MCNC: 730 MG/DL
IGM SERPL-MCNC: 36 MG/DL
IMM GRANULOCYTES # BLD AUTO: 0 K/UL
IMM GRANULOCYTES NFR BLD AUTO: 0 %
LYMPHOCYTES # BLD AUTO: 0.9 K/UL
LYMPHOCYTES NFR BLD: 34.5 %
MCH RBC QN AUTO: 29.6 PG
MCHC RBC AUTO-ENTMCNC: 32.8 G/DL
MCV RBC AUTO: 90 FL
MONOCYTES # BLD AUTO: 0.5 K/UL
MONOCYTES NFR BLD: 20.8 %
NEUTROPHILS # BLD AUTO: 1 K/UL
NEUTROPHILS NFR BLD: 40.8 %
NRBC BLD-RTO: 0 /100 WBC
PLATELET # BLD AUTO: 101 K/UL
PMV BLD AUTO: 10.7 FL
POTASSIUM SERPL-SCNC: 4.8 MMOL/L
PROT SERPL-MCNC: 6 G/DL
RBC # BLD AUTO: 4.69 M/UL
SODIUM SERPL-SCNC: 136 MMOL/L
WBC # BLD AUTO: 2.55 K/UL

## 2018-05-14 PROCEDURE — 63600175 PHARM REV CODE 636 W HCPCS: Performed by: INTERNAL MEDICINE

## 2018-05-14 PROCEDURE — 36415 COLL VENOUS BLD VENIPUNCTURE: CPT

## 2018-05-14 PROCEDURE — 99214 OFFICE O/P EST MOD 30 MIN: CPT | Mod: S$PBB,,, | Performed by: INTERNAL MEDICINE

## 2018-05-14 PROCEDURE — 82784 ASSAY IGA/IGD/IGG/IGM EACH: CPT | Mod: 59

## 2018-05-14 PROCEDURE — 80053 COMPREHEN METABOLIC PANEL: CPT

## 2018-05-14 PROCEDURE — 86334 IMMUNOFIX E-PHORESIS SERUM: CPT | Mod: 26,,, | Performed by: PATHOLOGY

## 2018-05-14 PROCEDURE — 86334 IMMUNOFIX E-PHORESIS SERUM: CPT

## 2018-05-14 PROCEDURE — 99213 OFFICE O/P EST LOW 20 MIN: CPT | Mod: PBBFAC,25 | Performed by: INTERNAL MEDICINE

## 2018-05-14 PROCEDURE — 85025 COMPLETE CBC W/AUTO DIFF WBC: CPT

## 2018-05-14 PROCEDURE — 83520 IMMUNOASSAY QUANT NOS NONAB: CPT | Mod: 59

## 2018-05-14 PROCEDURE — 84165 PROTEIN E-PHORESIS SERUM: CPT

## 2018-05-14 PROCEDURE — 96365 THER/PROPH/DIAG IV INF INIT: CPT

## 2018-05-14 PROCEDURE — 99999 PR PBB SHADOW E&M-EST. PATIENT-LVL III: CPT | Mod: PBBFAC,,, | Performed by: INTERNAL MEDICINE

## 2018-05-14 PROCEDURE — 25000003 PHARM REV CODE 250: Performed by: INTERNAL MEDICINE

## 2018-05-14 PROCEDURE — 84165 PROTEIN E-PHORESIS SERUM: CPT | Mod: 26,,, | Performed by: PATHOLOGY

## 2018-05-14 RX ORDER — SODIUM CHLORIDE 0.9 % (FLUSH) 0.9 %
10 SYRINGE (ML) INJECTION
Status: DISCONTINUED | OUTPATIENT
Start: 2018-05-14 | End: 2018-05-14 | Stop reason: HOSPADM

## 2018-05-14 RX ORDER — HEPARIN 100 UNIT/ML
500 SYRINGE INTRAVENOUS
Status: DISCONTINUED | OUTPATIENT
Start: 2018-05-14 | End: 2018-05-14 | Stop reason: HOSPADM

## 2018-05-14 RX ADMIN — SODIUM CHLORIDE 4 MG: 9 INJECTION, SOLUTION INTRAVENOUS at 03:05

## 2018-05-14 NOTE — PROGRESS NOTES
zometa today  Flank pain right; saw urologist and had ct last week awaitign rewsults; awaiting results ; to fax to us  -cbc, cmp, serum free light chains, quantitative immunoglobulins, serum electropheresis, serum immunofixation in [place] lab  -same labs, md appt, and chair for zometa infusion in 3 months

## 2018-05-14 NOTE — Clinical Note
-cbc, cmp, serum free light chains, quantitative immunoglobulins, serum electropheresis, serum immunofixation in [place] lab only in 6 weeks  -same labs, md fenton, and chair for zometa infusion in 3 months   At [facility] [place]  -please schedule am appointments

## 2018-05-14 NOTE — PLAN OF CARE
Problem: Patient Care Overview  Goal: Plan of Care Review  Outcome: Ongoing (interventions implemented as appropriate)  Pt tolerated zometa infusion without issue, pt to rtc in 3 months, no appt at this time, no distress noted upon d/c to home

## 2018-05-14 NOTE — PROGRESS NOTES
SECTION OF HEMATOLOGY AND BONE MARROW TRANSPLANT  Return Patient Visit   05/15/2018  Referred by:  No ref. provider found  Referred for:  MM, autoSCT    CHIEF COMPLAINT:   No chief complaint on file.      HISTORY OF PRESENT ILLNESS:   79 yo male with pmh as below, all well controlled on current medications; diagnosis of systemic myeloma, cytogenetic studies normal.  Initially presentedfor cataract surgery.  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  Patient subsequently completed 23 planned fractions of XRT + dex with rad onc in [place].     Both have resulted in improvement in proptosis and vision.  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  PET scan showed known orbit lesion as well as multiple other sites of  Bone disease.    He has completed 9 cycles of VRd and given CR on restaging, age, patient preference, decision was made not to proceed with transplant.  He was transitioned to revlimid maintenance may 2017 and has been tolerating well.   Due to cytopenias we transitioned to 5mg  21 day regimen of a 28 day cycle.   Recent admit for UTI and prostatic obstruction working with local urologist   Also with right flank pain had CT yesterday but does not know results.    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 He has never used smokeless tobacco. He reports that he drinks about 4.2 oz of alcohol per  week . He reports that he does not use drugs.    FAMILY HISTORY:  Family History   Problem Relation Age of Onset    Cancer Mother     Prostate cancer Father     Coronary artery disease Father     Dementia Sister     Stroke Brother     Dementia Brother     No Known Problems Brother     Dementia Brother     No Known Problems Brother     Pancreatitis Brother     Cancer Brother     No Known Problems Sister     No Known Problems Sister        CURRENT MEDICATIONS:   Current Outpatient Prescriptions   Medication Sig    acyclovir (ZOVIRAX) 400 MG tablet Take 1 tablet (400 mg total) by mouth 2 (two) times daily.    alfuzosin (UROXATRAL) 10 mg Tb24     aspirin (ECOTRIN) 81 MG EC tablet Take 81 mg by mouth once daily.    carvedilol (COREG) 6.25 MG tablet     cephALEXin (KEFLEX) 500 MG capsule Take 500 mg by mouth every 12 (twelve) hours.    COMBIGAN 0.2-0.5 % Drop     cycloSPORINE (RESTASIS) 0.05 % ophthalmic emulsion Place 0.4 mLs (1 drop total) into both eyes 2 (two) times daily.    dexamethasone (DECADRON) 4 MG Tab     DUREZOL 0.05 % Drop ophthalmic solution     hydrocortisone 1 % cream     ILEVRO 0.3 % DrpS INSTILL ONE DROP INTO LEFT EYE ONCE DAILY    ketoconazole (NIZORAL) 2 % shampoo     latanoprost 0.005 % ophthalmic solution     lenalidomide (REVLIMID) 5 mg Cap Take 1 capsule by mouth once daily. Day 1-21 of 28 day cycle.[employer] auth#[id number] on 05/11/18    levothyroxine (SYNTHROID) 125 MCG tablet     lifitegrast 5 % Dpet Place 1 drop into the right eye 2 (two) times daily.    multivitamin with minerals tablet Take 1 tablet by mouth once daily.    naproxen (NAPROSYN) 500 MG tablet     neomycin-polymyxin-hydrocortisone (CORTISPORIN) otic solution     omeprazole (PRILOSEC) 40 MG capsule     oxycodone-acetaminophen (ROXICET) 5-325 mg per tablet Take 1 tablet by mouth every 6 (six) hours as needed for Pain.    RESTASIS 0.05 % ophthalmic emulsion     rosuvastatin (CRESTOR) 20 MG tablet  Take 20 mg by mouth.    timolol maleate 0.5% (TIMOPTIC) 0.5 % Drop     tobramycin-dexamethasone 0.3-0.1% (TOBRADEX) 0.3-0.1 % DrpS Place 1 drop into the right eye 4 (four) times daily.     No current facility-administered medications for this visit.      ALLERGIES:   Review of patient's allergies indicates:   Allergen Reactions    Decongest tabs      All over the counter medications containing decongestive.              REVIEW OF SYSTEMS:   General ROS: negative  Psychological ROS: negative  Ophthalmic ROS: see HPI  ENT ROS: + right eye blurry vision   Allergy and Immunology ROS: negative  Hematological and Lymphatic ROS: negative  Endocrine ROS: negative  Respiratory ROS: negative  Cardiovascular ROS: negative  Gastrointestinal ROS: negative  Genito-Urinary ROS: negative  Musculoskeletal ROS: negative  Neurological ROS: negative  Dermatological ROS: negative    PHYSICAL EXAM:   Vitals:    05/14/18 1354   BP: 130/61   Pulse: (!) 56   Resp: 18   Temp: 97.8 °F (36.6 °C)       General - well developed, well nourished, no apparent distress  Head & Face - no sinus tenderness  Eyes - right eye appears red and irritated  ENT - normal external auditory canals and tympanic membranes bilaterally oropharynx clear,  Normal dentition and gums  Neck - normal thyroid  Chest and Lung - normal respiratory effort, clear to auscultation bilaterally   Cardiovascular - RRR with no MGR, normal S1 and S2; no pedal edema  Abdomen -  soft, nontender, no palpable hepatomegaly or splenomegaly  Lymph - no palpable lymphadenopathy  Extremities - unremarkable nails and digits  Heme - no bruising, petechiae, pallor  Skin - no rashes or lesions  Psych - appropriate mood and affect      ECOG Performance Status: (foot note - ECOG PS provided by Eastern Cooperative Oncology Group) 1 - Symptomatic but completely ambulatory    Karnofsky Performance Score:  90%- Able to Carry on Normal Activity: Minor Symptoms of Disease  DATA: .  Lab Results    Component Value Date    WBC 2.55 (L) 05/14/2018    HGB 13.9 (L) 05/14/2018    HCT 42.4 05/14/2018    MCV 90 05/14/2018     (L) 05/14/2018     Gran # (ANC)   Date Value Ref Range Status   05/14/2018 1.0 (L) 1.8 - 7.7 K/uL Final     Gran%   Date Value Ref Range Status   05/14/2018 40.8 38.0 - 73.0 % Final     Lymph #   Date Value Ref Range Status   05/14/2018 0.9 (L) 1.0 - 4.8 K/uL Final     Lymph%   Date Value Ref Range Status   05/14/2018 34.5 18.0 - 48.0 % Final     CMP  Sodium   Date Value Ref Range Status   05/14/2018 136 136 - 145 mmol/L Final     Potassium   Date Value Ref Range Status   05/14/2018 4.8 3.5 - 5.1 mmol/L Final     Chloride   Date Value Ref Range Status   05/14/2018 105 95 - 110 mmol/L Final     CO2   Date Value Ref Range Status   05/14/2018 26 23 - 29 mmol/L Final     Glucose   Date Value Ref Range Status   05/14/2018 100 70 - 110 mg/dL Final     BUN, Bld   Date Value Ref Range Status   05/14/2018 21 8 - 23 mg/dL Final     Creatinine   Date Value Ref Range Status   05/14/2018 1.0 0.5 - 1.4 mg/dL Final     Calcium   Date Value Ref Range Status   05/14/2018 9.2 8.7 - 10.5 mg/dL Final     Total Protein   Date Value Ref Range Status   05/14/2018 6.0 6.0 - 8.4 g/dL Final     Albumin   Date Value Ref Range Status   05/14/2018 3.6 3.5 - 5.2 g/dL Final     Total Bilirubin   Date Value Ref Range Status   05/14/2018 0.8 0.1 - 1.0 mg/dL Final     Comment:     For infants and newborns, interpretation of results should be based  on gestational age, weight and in agreement with clinical  observations.  Premature Infant recommended reference ranges:  Up to 24 hours.............<8.0 mg/dL  Up to 48 hours............<12.0 mg/dL  3-5 days..................<15.0 mg/dL  6-29 days.................<15.0 mg/dL       Alkaline Phosphatase   Date Value Ref Range Status   05/14/2018 43 (L) 55 - 135 U/L Final     AST   Date Value Ref Range Status   05/14/2018 15 10 - 40 U/L Final     ALT   Date Value Ref Range  Status   05/14/2018 16 10 - 44 U/L Final     Anion Gap   Date Value Ref Range Status   05/14/2018 5 (L) 8 - 16 mmol/L Final     eGFR if    Date Value Ref Range Status   05/14/2018 >60.0 >60 mL/min/1.73 m^2 Final     eGFR if non    Date Value Ref Range Status   05/14/2018 >60.0 >60 mL/min/1.73 m^2 Final     Comment:     Calculation used to obtain the estimated glomerular filtration  rate (eGFR) is the CKD-EPI equation.        IgG - Serum   Date Value Ref Range Status   05/14/2018 730 650 - 1600 mg/dL Final     Comment:     IgG Cord Blood Reference Range: 650-1600 mg/dL.     IgA   Date Value Ref Range Status   05/14/2018 112 40 - 350 mg/dL Final     Comment:     IgA Cord Blood Reference Range: <5 mg/dL.     IgM   Date Value Ref Range Status   05/14/2018 36 (L) 50 - 300 mg/dL Final     Comment:     IgM Cord Blood Reference Range: <25 mg/dL.     Kappa Free Light Chains   Date Value Ref Range Status   05/14/2018 1.88 0.33 - 1.94 mg/dL Final   04/10/2018 1.94 0.33 - 1.94 mg/dL Final   03/19/2018 2.99 (H) 0.33 - 1.94 mg/dL Final     Lambda Free Light Chains   Date Value Ref Range Status   05/14/2018 1.88 0.57 - 2.63 mg/dL Final   04/10/2018 1.66 0.57 - 2.63 mg/dL Final   03/19/2018 2.22 0.57 - 2.63 mg/dL Final     Kappa/Lambda FLC Ratio   Date Value Ref Range Status   05/14/2018 1.00 0.26 - 1.65 Final   04/10/2018 1.17 0.26 - 1.65 Final   03/19/2018 1.35 0.26 - 1.65 Final      MRI orbit - 4/171/17  Narrative     Comparison CT 2016 and MRI 03/02/2017 studies.  MRI brain face and orbits without and with gadolinium 7 cc intravenous.    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      No new intraosseous or intracranial lesions.  The vertebral, basilar common internal carotid arteries and major branches remain patent.  Few tiny foci  hyperattenuation posterior frontal high convexity subcortical deep white matter unchanged.   Central and cortical atrophy appropriate for age.    Mucous retention cyst right maxillary sinus, post cataract surgery on right stable.  Mucosal hypertrophy of the ethmoid and frontal sinuses unchanged.   Impression      1. Right sphenoid wing intraosseous lesion attributed to plasmacytoma is stable, allowing for differences in technique and positioning.    2.  Brain stable with few tiny areas of focal remote gliosis.       PLEASE SEE EPIC MEDIA TAB FOR ALL IMAGING, PATHOLOGY REPORTS FROM OSH  ASSESSMENT AND PLAN:   Encounter Diagnoses   Name Primary?   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   -completed  9 cycles of VRd and achieved a IMWG CR on restaging pet, bone marrow biopsy, and biochemical studies may 2017 staging; transitioned to revlimid maintenance 10mg daily may 2017; remains in remission as of march 2018 BCS; today's studies pending   -repeat SS jan 2018 for baseline plain film imaging; no symptoms or labs to suggest these are active lesions; next due jan 2019  -due to persistent cytopenias despite transitioned to 21 day cycle with decreased revlimid to 5mg day 1-21 of 28 day cycle (1/15/17);  Monitor cytopenias    -continue fu with local opthalmology for eye irritation; suspect residual effect from radiation and not related to myeloma    -supportive meds:   asa, bisphosphanate q 3 months x 1 year; dose 1 of 4  (1/15/17); next 2 of 4 today; dose 3 of 4 in 3 months   -to have colonscopy for  cecal abnormality on PET; forward note to pcp to make sure his age appropriate cancer screening up to date  -asked pt to have recent ct a/p report faxed to me when gets results     Follow Up: -cbc, cmp, serum free light chains, quantitative immunoglobulins, serum electropheresis, serum immunofixation lab only at [facility] in 6 weeks   -same  labs, md appt,  zometa infusion in 3 months at [facility]     Bernardo Park MD  Hematology/Oncology/Bone Marrow Transplant    Addendum - 5/16/18  Received 5/11/18 ct renal stone report from [facility]; notable for 6.1cm complex cystic mass right illiopsoas muscle; increased in size from previous scan at same institution; recommend repeat ct a/p with contrast at [facility] and possible biopsy pending results.  discused with daugther.  Will have scan set up asap and call pt with results/and further instructions.    Please see [facility] for osh ct report.   Bernardo Park MD  Hematology & Stem Cell Transplant

## 2018-05-14 NOTE — PLAN OF CARE
Problem: Patient Care Overview  Goal: Plan of Care Review  Outcome: Ongoing (interventions implemented as appropriate)  Pt here for zometa infusion , labs, hx, meds, allergies reviewed, pt with no complaints at this time, reclined inc hair, continue to monitor

## 2018-05-15 LAB
ALBUMIN SERPL ELPH-MCNC: 3.63 G/DL
ALPHA1 GLOB SERPL ELPH-MCNC: 0.23 G/DL
ALPHA2 GLOB SERPL ELPH-MCNC: 0.55 G/DL
B-GLOBULIN SERPL ELPH-MCNC: 0.59 G/DL
GAMMA GLOB SERPL ELPH-MCNC: 0.7 G/DL
INTERPRETATION SERPL IFE-IMP: NORMAL
KAPPA LC SER QL IA: 1.88 MG/DL
KAPPA LC/LAMBDA SER IA: 1
LAMBDA LC SER QL IA: 1.88 MG/DL
PATHOLOGIST INTERPRETATION IFE: NORMAL
PATHOLOGIST INTERPRETATION SPE: NORMAL
PROT SERPL-MCNC: 5.7 G/DL

## 2018-05-16 ENCOUNTER — TELEPHONE (OUTPATIENT)
Dept: HEMATOLOGY/ONCOLOGY | Facility: CLINIC | Age: 78
End: 2018-05-16

## 2018-05-16 ENCOUNTER — DOCUMENTATION ONLY (OUTPATIENT)
Dept: HEMATOLOGY/ONCOLOGY | Facility: CLINIC | Age: 78
End: 2018-05-16

## 2018-05-16 NOTE — TELEPHONE ENCOUNTER
----- Message from Warren Park MD sent at 5/16/2018  8:53 AM CDT -----  Can you please let pt know Labs show continued remission of your myeloma. Good news.  No change in our plan. He's not on my [facility].     ----- Message -----  From: [facility]  Sent: 5/14/2018  12:53 PM  To: Warren Park MD

## 2018-05-16 NOTE — TELEPHONE ENCOUNTER
----- Message from Warren Park MD sent at 5/16/2018 10:35 AM CDT -----  Please set up pt for ct a/p at [facility] in the next 1-2 weeks . Order is in epic  Thank you

## 2018-05-16 NOTE — TELEPHONE ENCOUNTER
Spoke with pt, explained that his labs show continued remission of his myeloma per Dr. Park.  No change in our plan. Pt verbalized understanding.  Marina

## 2018-06-01 ENCOUNTER — HOSPITAL ENCOUNTER (OUTPATIENT)
Dept: RADIOLOGY | Facility: HOSPITAL | Age: 78
Discharge: HOME | End: 2018-06-01
Attending: INTERNAL MEDICINE
Payer: MEDICARE

## 2018-06-01 DIAGNOSIS — R19.00 PALPABLE ABDOMINAL MASS: ICD-10-CM

## 2018-06-01 PROCEDURE — 25500020 PHARM REV CODE 255: Performed by: INTERNAL MEDICINE

## 2018-06-01 PROCEDURE — 74177 CT ABD & PELVIS W/CONTRAST: CPT | Mod: TC

## 2018-06-01 PROCEDURE — 74177 CT ABD & PELVIS W/CONTRAST: CPT | Mod: 26,,, | Performed by: RADIOLOGY

## 2018-06-01 RX ADMIN — IOHEXOL 75 ML: 350 INJECTION, SOLUTION INTRAVENOUS at 11:06

## 2018-06-01 RX ADMIN — IOHEXOL 30 ML: 350 INJECTION, SOLUTION INTRAVENOUS at 11:06

## 2018-06-04 ENCOUNTER — TELEPHONE (OUTPATIENT)
Dept: HEMATOLOGY/ONCOLOGY | Facility: CLINIC | Age: 78
End: 2018-06-04

## 2018-06-04 NOTE — TELEPHONE ENCOUNTER
----- Message from Warren Park MD sent at 6/4/2018  1:00 PM CDT -----  Can you please move his next appt with me  up to 6/25 after his labs ?

## 2018-06-05 ENCOUNTER — TELEPHONE (OUTPATIENT)
Dept: HEMATOLOGY/ONCOLOGY | Facility: CLINIC | Age: 78
End: 2018-06-05

## 2018-06-05 DIAGNOSIS — N94.89 ADNEXAL MASS: ICD-10-CM

## 2018-06-05 DIAGNOSIS — C18.2 MALIGNANT NEOPLASM OF ASCENDING COLON: ICD-10-CM

## 2018-06-05 DIAGNOSIS — C80.1 CANCER: ICD-10-CM

## 2018-06-05 NOTE — TELEPHONE ENCOUNTER
Spoke with pt' daughter. She stated she is okay with keeping scheduled appointment on 06/25/18 if Dr. Park can call with CT results before. Explained to pt that I will discuss this with Dr. Park when he comes to clinic and either Dr. Park or myself will contact her. Pt verbalized understanding.  Marina

## 2018-06-05 NOTE — TELEPHONE ENCOUNTER
----- Message from Felix Ponce sent at 6/5/2018 11:15 AM CDT -----  Contact: Pt Daughter   Pt would like the nurse to give her a call back in regards to rescheduling pt appointment and getting pt results .. Contact number [phone]...

## 2018-06-08 ENCOUNTER — HOSPITAL ENCOUNTER (OUTPATIENT)
Dept: RADIOLOGY | Facility: HOSPITAL | Age: 78
Discharge: HOME | End: 2018-06-08
Attending: INTERNAL MEDICINE
Payer: MEDICARE

## 2018-06-08 DIAGNOSIS — C18.2 MALIGNANT NEOPLASM OF ASCENDING COLON: ICD-10-CM

## 2018-06-08 DIAGNOSIS — N94.89 ADNEXAL MASS: ICD-10-CM

## 2018-06-08 DIAGNOSIS — C80.1 CANCER: ICD-10-CM

## 2018-06-08 PROCEDURE — 78815 PET IMAGE W/CT SKULL-THIGH: CPT | Mod: TC

## 2018-06-08 PROCEDURE — A9552 F18 FDG: HCPCS

## 2018-06-08 PROCEDURE — 78815 PET IMAGE W/CT SKULL-THIGH: CPT | Mod: 26,PS,, | Performed by: RADIOLOGY

## 2018-06-11 ENCOUNTER — TELEPHONE (OUTPATIENT)
Dept: HEMATOLOGY/ONCOLOGY | Facility: CLINIC | Age: 78
End: 2018-06-11

## 2018-06-11 DIAGNOSIS — R19.00 RETROPERITONEAL MASS: Primary | ICD-10-CM

## 2018-06-11 NOTE — TELEPHONE ENCOUNTER
Spoke with pt's daughter about PET scan results showing mass at right lower quadrant. Mentioned to pt's daughter that Dr. Park would like this mass to be biopsied by IR. Will have IR schedule bx soon (this week or very early next week) and pt to keep appt with Dr. Park on 6/25/18 to review bx results. Order placed for bx. Answered all of daughter's questions.    Elizabeth Lyon DNP, NP  Hematology/Oncology      ----- Message from Warren Park MD sent at 6/11/2018 10:07 AM CDT -----  Contact: Pt  Sorry I think I sent this message to bhavik forgetting elizabeth is in clinic. Elizabeth can you call and review and let her know we need to get IR core biopsy of the mass on pet scan set up and please have IR set up to do this and have pt see me 1 week after biopsy.  Thank you   ----- Message -----  From: Marina Beckham RN  Sent: 6/4/2018   4:46 PM  To: Warren Park MD    Please call pt's daughter at [phone] regarding CT results.  Thanks  ----- Message -----  From: Consuelo Gerard  Sent: 6/4/2018   4:25 PM  To: [facility]DR[facility] Staff    Pt is calling to speak with test results and can be reached at [phone].    Thank you

## 2018-06-12 ENCOUNTER — TELEPHONE (OUTPATIENT)
Dept: HEMATOLOGY/ONCOLOGY | Facility: CLINIC | Age: 78
End: 2018-06-12

## 2018-06-12 NOTE — TELEPHONE ENCOUNTER
----- Message from Va Gaitan sent at 6/12/2018  9:58 AM CDT -----  Contact: Pt daughter Ratna  Pt daughter calling to check status of biopsy appt. She states pt is supposed to be scheduled this week        Ratna call back number [phone]

## 2018-06-12 NOTE — TELEPHONE ENCOUNTER
Spoke with pt's daughter. Explained that we are waiting on the approval from the doctor in Interventional radiology department to proceed with the procedure. Once approved a date will be scheduled. Pt's daughter verbalized understanding.  Marina

## 2018-06-13 ENCOUNTER — TELEPHONE (OUTPATIENT)
Dept: HEMATOLOGY/ONCOLOGY | Facility: CLINIC | Age: 78
End: 2018-06-13

## 2018-06-13 NOTE — TELEPHONE ENCOUNTER
Spoke with Barbara. Explained that I have spoken with Bibi with interventional radiology and she should receive a phone call today from interventional radiology to schedule the biopsy. Barbara verbalized understanding.  Marina

## 2018-06-13 NOTE — TELEPHONE ENCOUNTER
----- Message from Duncan Cyr sent at 6/13/2018  1:31 PM CDT -----  Contact: Daughter-Barbara   Will like a call from office in regards to biopsy due to mass on right side     Contact::[phone]

## 2018-06-15 DIAGNOSIS — R19.00 RETROPERITONEAL MASS: Primary | ICD-10-CM

## 2018-06-15 DIAGNOSIS — C90.00 MULTIPLE MYELOMA, REMISSION STATUS UNSPECIFIED: Primary | ICD-10-CM

## 2018-06-15 RX ORDER — LENALIDOMIDE 5 MG/1
5 CAPSULE ORAL DAILY
Qty: 21 EACH | Refills: 0 | Status: SHIPPED | OUTPATIENT
Start: 2018-06-15 | End: 2018-07-13

## 2018-06-20 DIAGNOSIS — D49.9 NEOPLASM: ICD-10-CM

## 2018-06-20 DIAGNOSIS — R19.00 RETROPERITONEAL MASS: Primary | ICD-10-CM

## 2018-06-21 ENCOUNTER — HOSPITAL ENCOUNTER (OUTPATIENT)
Facility: HOSPITAL | Age: 78
Discharge: HOME | End: 2018-06-21
Attending: RADIOLOGY | Admitting: RADIOLOGY
Payer: MEDICARE

## 2018-06-21 VITALS
WEIGHT: 165 LBS | TEMPERATURE: 98 F | OXYGEN SATURATION: 96 % | RESPIRATION RATE: 12 BRPM | SYSTOLIC BLOOD PRESSURE: 134 MMHG | DIASTOLIC BLOOD PRESSURE: 57 MMHG | BODY MASS INDEX: 23.62 KG/M2 | HEART RATE: 59 BPM | HEIGHT: 70 IN

## 2018-06-21 DIAGNOSIS — R19.00 RETROPERITONEAL MASS: ICD-10-CM

## 2018-06-21 LAB
APPEARANCE FLD: NORMAL
BODY FLD TYPE: NORMAL
COLOR FLD: NORMAL
GRAM STN SPEC: NORMAL
GRAM STN SPEC: NORMAL
LYMPHOCYTES NFR FLD MANUAL: 62 %
NEUTROPHILS NFR FLD MANUAL: 38 %
WBC # FLD: 380 /CU MM

## 2018-06-21 PROCEDURE — 87205 SMEAR GRAM STAIN: CPT

## 2018-06-21 PROCEDURE — 87102 FUNGUS ISOLATION CULTURE: CPT

## 2018-06-21 PROCEDURE — 89051 BODY FLUID CELL COUNT: CPT

## 2018-06-21 PROCEDURE — 88112 CYTOPATH CELL ENHANCE TECH: CPT | Mod: 26,,, | Performed by: PATHOLOGY

## 2018-06-21 PROCEDURE — 25000003 PHARM REV CODE 250: Performed by: FAMILY MEDICINE

## 2018-06-21 PROCEDURE — 88305 TISSUE EXAM BY PATHOLOGIST: CPT | Performed by: PATHOLOGY

## 2018-06-21 PROCEDURE — 88305 TISSUE EXAM BY PATHOLOGIST: CPT | Mod: 26,,, | Performed by: PATHOLOGY

## 2018-06-21 PROCEDURE — 88333 PATH CONSLTJ SURG CYTO XM 1: CPT | Mod: 26,,, | Performed by: PATHOLOGY

## 2018-06-21 PROCEDURE — 63600175 PHARM REV CODE 636 W HCPCS: Performed by: RADIOLOGY

## 2018-06-21 PROCEDURE — 87075 CULTR BACTERIA EXCEPT BLOOD: CPT

## 2018-06-21 PROCEDURE — 87070 CULTURE OTHR SPECIMN AEROBIC: CPT

## 2018-06-21 PROCEDURE — 63600175 PHARM REV CODE 636 W HCPCS: Performed by: FAMILY MEDICINE

## 2018-06-21 PROCEDURE — 87206 SMEAR FLUORESCENT/ACID STAI: CPT

## 2018-06-21 PROCEDURE — 87116 MYCOBACTERIA CULTURE: CPT

## 2018-06-21 RX ORDER — MIDAZOLAM HYDROCHLORIDE 1 MG/ML
1 INJECTION INTRAMUSCULAR; INTRAVENOUS
Status: DISCONTINUED | OUTPATIENT
Start: 2018-06-21 | End: 2018-06-21 | Stop reason: HOSPADM

## 2018-06-21 RX ORDER — FENTANYL CITRATE 50 UG/ML
INJECTION, SOLUTION INTRAMUSCULAR; INTRAVENOUS CODE/TRAUMA/SEDATION MEDICATION
Status: COMPLETED | OUTPATIENT
Start: 2018-06-21 | End: 2018-06-21

## 2018-06-21 RX ORDER — MIDAZOLAM HYDROCHLORIDE 1 MG/ML
INJECTION INTRAMUSCULAR; INTRAVENOUS CODE/TRAUMA/SEDATION MEDICATION
Status: COMPLETED | OUTPATIENT
Start: 2018-06-21 | End: 2018-06-21

## 2018-06-21 RX ORDER — SODIUM CHLORIDE 9 MG/ML
500 INJECTION, SOLUTION INTRAVENOUS ONCE
Status: COMPLETED | OUTPATIENT
Start: 2018-06-21 | End: 2018-06-21

## 2018-06-21 RX ORDER — FENTANYL CITRATE 50 UG/ML
50 INJECTION, SOLUTION INTRAMUSCULAR; INTRAVENOUS
Status: DISCONTINUED | OUTPATIENT
Start: 2018-06-21 | End: 2018-06-21 | Stop reason: HOSPADM

## 2018-06-21 RX ADMIN — FENTANYL CITRATE 50 MCG: 50 INJECTION, SOLUTION INTRAMUSCULAR; INTRAVENOUS at 01:06

## 2018-06-21 RX ADMIN — SODIUM CHLORIDE 500 ML: 0.9 INJECTION, SOLUTION INTRAVENOUS at 09:06

## 2018-06-21 RX ADMIN — MIDAZOLAM HYDROCHLORIDE 1 MG: 1 INJECTION, SOLUTION INTRAMUSCULAR; INTRAVENOUS at 01:06

## 2018-06-21 NOTE — NURSING
Discharge instructions given. Wife at bedside. Verbalized understand. PIV removed. Catheter intact, site without swelling. Instructed on incision site care and symptoms that require MD attention.

## 2018-06-21 NOTE — PROGRESS NOTES
Retroperitoneal mass biopsy completed, pt tolerated well. No apparent distress noted. Dressing applied CDI. Labs collected and sent. Biopsies collected and sent with pathology. Pt to be transferred to [facility], report to be given at bedside.

## 2018-06-21 NOTE — H&P
Radiology History & Physical      SUBJECTIVE:     Chief Complaint: retroperitoneal mass     History of Present Illness:  Tahir Wynne is a 78 y.o. male who presents for biopsy of a right sided retroperitoneal mass.     Past Medical History:   Diagnosis Date    Allergy     Anxiety     Cataract     Glaucoma     Hypertension     denies htn states takes coreg for rhythm    Prostate cancer     Thyroid disease      Past Surgical History:   Procedure Laterality Date    BRAIN SURGERY  2010    optic nerve tumor    broken leg Left     CARPAL TUNNEL RELEASE Bilateral     EYE SURGERY Right     SHOULDER SURGERY Right        Home Meds:   Prior to Admission medications    Medication Sig Start Date End Date Taking? Authorizing Provider   acyclovir (ZOVIRAX) 400 MG tablet Take 1 tablet (400 mg total) by mouth 2 (two) times daily. 6/8/17  Yes Warren Park MD   alfuzosin (UROXATRAL) 10 mg Tb24  9/2/16  Yes Historical Provider, MD   carvedilol (COREG) 6.25 MG tablet  9/25/16  Yes Historical Provider, MD   cephALEXin (KEFLEX) 500 MG capsule Take 500 mg by mouth every 12 (twelve) hours.   Yes Historical Provider, MD   COMBIGAN 0.2-0.5 % Drop  10/30/17  Yes Historical Provider, MD   cycloSPORINE (RESTASIS) 0.05 % ophthalmic emulsion Place 0.4 mLs (1 drop total) into both eyes 2 (two) times daily. 9/18/17  Yes Whit Salas MD   dexamethasone (DECADRON) 4 MG Tab  9/28/16  Yes Historical Provider, MD   DUREZOL 0.05 % Drop ophthalmic solution  10/22/17  Yes Historical Provider, MD   hydrocortisone 1 % cream  8/24/16  Yes Historical Provider, MD   ILEVRO 0.3 % DrpS INSTILL ONE DROP INTO LEFT EYE ONCE DAILY 10/22/17  Yes Historical Provider, MD   latanoprost 0.005 % ophthalmic solution  12/7/16  Yes Historical Provider, MD   lenalidomide 5 mg Cap Take 5 mg by mouth once daily. Take on days 1-21 of a 28-day cycle. [employer] auth#[id number] on 06/15/18 6/15/18 7/13/18 Yes Aysha Spangler MD   levothyroxine (SYNTHROID)  125 MCG tablet  7/12/16  Yes Historical Provider, MD   lifitegrast 5 % Dpet Place 1 drop into the right eye 2 (two) times daily. 5/29/17  Yes Damien Evans MD   multivitamin with minerals tablet Take 1 tablet by mouth once daily.   Yes Historical Provider, MD   naproxen (NAPROSYN) 500 MG tablet  9/19/16  Yes Historical Provider, MD   omeprazole (PRILOSEC) 40 MG capsule  7/12/16  Yes Historical Provider, MD   RESTASIS 0.05 % ophthalmic emulsion  12/12/16  Yes Historical Provider, MD   rosuvastatin (CRESTOR) 20 MG tablet Take 20 mg by mouth.   Yes Historical Provider, MD   aspirin (ECOTRIN) 81 MG EC tablet Take 81 mg by mouth once daily.    Historical Provider, MD   ketoconazole (NIZORAL) 2 % shampoo  7/6/16   Historical Provider, MD   neomycin-polymyxin-hydrocortisone (CORTISPORIN) otic solution  7/12/16   Historical Provider, MD   oxycodone-acetaminophen (ROXICET) 5-325 mg per tablet Take 1 tablet by mouth every 6 (six) hours as needed for Pain. 6/30/17 6/30/18  Damien Evans MD   timolol maleate 0.5% (TIMOPTIC) 0.5 % Drop  3/13/18   Historical Provider, MD   tobramycin-dexamethasone 0.3-0.1% (TOBRADEX) 0.3-0.1 % DrpS Place 1 drop into the right eye 4 (four) times daily. 6/30/17   Damien Evans MD     Anticoagulants/Antiplatelets: ASA held for 5 days     Allergies:   Review of patient's allergies indicates:   Allergen Reactions    Levofloxacin Hives    Decongest tabs      All over the counter medications containing decongestive.     Quinolones Hives     Sedation History:  no adverse reactions    Review of Systems:   Hematological: no known coagulopathies  Respiratory: no shortness of breath  Cardiovascular: no chest pain  Gastrointestinal: no abdominal pain  Genito-Urinary: no dysuria  Musculoskeletal: negative  Neurological: no TIA or stroke symptoms         OBJECTIVE:     Vital Signs (Most Recent)  Temp: 97.7 °F (36.5 °C) (06/21/18 0931)  Pulse: (!) 53 (06/21/18 0931)  Resp: 15 (06/21/18  0931)  BP: (!) 153/64 (06/21/18 0931)  SpO2: 99 % (06/21/18 0931)    Physical Exam:  ASA: 2  Mallampati: 2    General: no acute distress  Mental Status: alert and oriented to person, place and time  HEENT: normocephalic, atraumatic  Chest: unlabored breathing  Heart: regular heart rate  Abdomen: nondistended  Extremity: moves all extremities    Laboratory  Lab Results   Component Value Date    INR 1.1 06/21/2018       Lab Results   Component Value Date    WBC 2.65 (L) 06/21/2018    HGB 14.4 06/21/2018    HCT 44.3 06/21/2018    MCV 92 06/21/2018    PLT 81 (L) 06/21/2018      Lab Results   Component Value Date     05/14/2018     05/14/2018    K 4.8 05/14/2018     05/14/2018    CO2 26 05/14/2018    BUN 21 05/14/2018    CREATININE 1.0 05/14/2018    CALCIUM 9.2 05/14/2018    ALT 16 05/14/2018    AST 15 05/14/2018    ALBUMIN 3.6 05/14/2018    BILITOT 0.8 05/14/2018       ASSESSMENT/PLAN:     Sedation Plan: moderate   Patient will undergo retroperitoneal mass biopsy.    Albert Davidson MD  Department of Radiology    PGY II  [phone]

## 2018-06-21 NOTE — PROGRESS NOTES
Pt arrived to IR room 173 for retroperitoneal mass biopsy, no acute distress noted. Orders, consent and labs reviewed on chart.

## 2018-06-21 NOTE — DISCHARGE INSTRUCTIONS
For scheduling: Call Bibi at [phone]    For questions or concerns call: HOWARD MON-FRI 8 AM- 5PM [phone].   Radiology resident on call [phone].    For immediate concerns that are not emergent, you may call our radiology clinic at: [phone]

## 2018-06-21 NOTE — PROGRESS NOTES
Pt arrived to [facility] bed 2 for hour post Retroperitoneal mass biopsy recovery. Report received from BRANT Loera. Pt denies pain/discomfort. Dressing CDI. VSS. No acute events. See flow sheets for post procedure monitoring.

## 2018-06-22 NOTE — DISCHARGE SUMMARY
Radiology Discharge Summary      Hospital Course: No complications    Admit Date: 6/21/2018  Discharge Date: 06/22/2018     Instructions Given to Patient: Yes  Diet: Resume prior diet  Activity: activity as tolerated and no driving for today    Description of Condition on Discharge: Stable  Vital Signs (Most Recent): Temp: 98 °F (36.7 °C) (06/21/18 1400)  Pulse: (!) 59 (06/21/18 1445)  Resp: 12 (06/21/18 1445)  BP: (!) 134/57 (06/21/18 1445)  SpO2: 96 % (06/21/18 1445)    Discharge Disposition: Home    Discharge Diagnosis:     Procedure: Right retroperitoneal mass aspiration and biopsy     Follow-up:   Per referring physician.    Seth Witt MD  Department of Radiology  Pager: [phone]

## 2018-06-22 NOTE — PROCEDURES
Radiology Post-Procedure Note    Pre Op Diagnosis: right retroperitoneal mass    Post Op Diagnosis: same    Procedure: CT guided  Biopsy and aspiration    Procedure performed by: Seth Witt MD    Written Informed Consent Obtained: Yes    Specimen Removed: YES 15 cc dark blood suggesting old hematoma    Estimated Blood Loss: Minimal    Findings:   Using CT guidance a 19g sheath needle was placed into a right retoperitoneal mass. 20g monopty biopsy gun used to take 4 core biopsy samples. Specimen sent to pathology. 15 cc dark bloody fluid aspirated.    Additional specimen sent to lab: Yes for cytology and culture.    Patient tolerated procedure well.    Seth Witt MD  Department of Radiology  Pager: [phone]

## 2018-06-25 LAB — BACTERIA SPEC AEROBE CULT: NO GROWTH

## 2018-06-28 LAB — BACTERIA SPEC ANAEROBE CULT: NORMAL

## 2018-06-29 ENCOUNTER — LAB VISIT (OUTPATIENT)
Dept: LAB | Facility: HOSPITAL | Age: 78
End: 2018-06-29
Attending: INTERNAL MEDICINE
Payer: MEDICARE

## 2018-06-29 ENCOUNTER — OFFICE VISIT (OUTPATIENT)
Dept: HEMATOLOGY/ONCOLOGY | Facility: CLINIC | Age: 78
End: 2018-06-29
Payer: MEDICARE

## 2018-06-29 VITALS
WEIGHT: 170.44 LBS | HEIGHT: 70 IN | OXYGEN SATURATION: 98 % | DIASTOLIC BLOOD PRESSURE: 65 MMHG | SYSTOLIC BLOOD PRESSURE: 139 MMHG | BODY MASS INDEX: 24.4 KG/M2 | HEART RATE: 56 BPM | TEMPERATURE: 98 F

## 2018-06-29 DIAGNOSIS — C90.01 MULTIPLE MYELOMA IN REMISSION: ICD-10-CM

## 2018-06-29 DIAGNOSIS — D72.819 LEUKOPENIA, UNSPECIFIED TYPE: ICD-10-CM

## 2018-06-29 DIAGNOSIS — C90.01 MULTIPLE MYELOMA IN REMISSION: Primary | ICD-10-CM

## 2018-06-29 DIAGNOSIS — K68.3 RETROPERITONEAL HEMATOMA: ICD-10-CM

## 2018-06-29 LAB
ALBUMIN SERPL BCP-MCNC: 3.5 G/DL
ALP SERPL-CCNC: 48 U/L
ALT SERPL W/O P-5'-P-CCNC: 18 U/L
ANION GAP SERPL CALC-SCNC: 5 MMOL/L
AST SERPL-CCNC: 13 U/L
BASOPHILS # BLD AUTO: 0.02 K/UL
BASOPHILS NFR BLD: 0.9 %
BILIRUB SERPL-MCNC: 0.5 MG/DL
BUN SERPL-MCNC: 24 MG/DL
CALCIUM SERPL-MCNC: 9 MG/DL
CHLORIDE SERPL-SCNC: 108 MMOL/L
CO2 SERPL-SCNC: 25 MMOL/L
CREAT SERPL-MCNC: 1.1 MG/DL
DIFFERENTIAL METHOD: ABNORMAL
EOSINOPHIL # BLD AUTO: 0.1 K/UL
EOSINOPHIL NFR BLD: 5.5 %
ERYTHROCYTE [DISTWIDTH] IN BLOOD BY AUTOMATED COUNT: 14.4 %
EST. GFR  (AFRICAN AMERICAN): >60 ML/MIN/1.73 M^2
EST. GFR  (NON AFRICAN AMERICAN): >60 ML/MIN/1.73 M^2
GLUCOSE SERPL-MCNC: 105 MG/DL
HCT VFR BLD AUTO: 40.2 %
HGB BLD-MCNC: 13.3 G/DL
IGA SERPL-MCNC: 119 MG/DL
IGG SERPL-MCNC: 729 MG/DL
IGM SERPL-MCNC: 37 MG/DL
IMM GRANULOCYTES # BLD AUTO: 0 K/UL
IMM GRANULOCYTES NFR BLD AUTO: 0 %
LYMPHOCYTES # BLD AUTO: 0.9 K/UL
LYMPHOCYTES NFR BLD: 42.3 %
MCH RBC QN AUTO: 30.1 PG
MCHC RBC AUTO-ENTMCNC: 33.1 G/DL
MCV RBC AUTO: 91 FL
MONOCYTES # BLD AUTO: 0.5 K/UL
MONOCYTES NFR BLD: 22.3 %
NEUTROPHILS # BLD AUTO: 0.6 K/UL
NEUTROPHILS NFR BLD: 29 %
NRBC BLD-RTO: 0 /100 WBC
PLATELET # BLD AUTO: 90 K/UL
PLATELET BLD QL SMEAR: ABNORMAL
PMV BLD AUTO: 10.7 FL
POTASSIUM SERPL-SCNC: 4.6 MMOL/L
PROT SERPL-MCNC: 5.9 G/DL
RBC # BLD AUTO: 4.42 M/UL
SODIUM SERPL-SCNC: 138 MMOL/L
WBC # BLD AUTO: 2.2 K/UL

## 2018-06-29 PROCEDURE — 82784 ASSAY IGA/IGD/IGG/IGM EACH: CPT | Mod: 59

## 2018-06-29 PROCEDURE — 84165 PROTEIN E-PHORESIS SERUM: CPT

## 2018-06-29 PROCEDURE — 36415 COLL VENOUS BLD VENIPUNCTURE: CPT

## 2018-06-29 PROCEDURE — 80053 COMPREHEN METABOLIC PANEL: CPT

## 2018-06-29 PROCEDURE — 84165 PROTEIN E-PHORESIS SERUM: CPT | Mod: 26,,, | Performed by: PATHOLOGY

## 2018-06-29 PROCEDURE — 99215 OFFICE O/P EST HI 40 MIN: CPT | Mod: PBBFAC | Performed by: INTERNAL MEDICINE

## 2018-06-29 PROCEDURE — 86334 IMMUNOFIX E-PHORESIS SERUM: CPT

## 2018-06-29 PROCEDURE — 86334 IMMUNOFIX E-PHORESIS SERUM: CPT | Mod: 26,,, | Performed by: PATHOLOGY

## 2018-06-29 PROCEDURE — 99214 OFFICE O/P EST MOD 30 MIN: CPT | Mod: S$PBB,,, | Performed by: INTERNAL MEDICINE

## 2018-06-29 PROCEDURE — 99999 PR PBB SHADOW E&M-EST. PATIENT-LVL V: CPT | Mod: PBBFAC,,, | Performed by: INTERNAL MEDICINE

## 2018-06-29 PROCEDURE — 83520 IMMUNOASSAY QUANT NOS NONAB: CPT | Mod: 59

## 2018-06-29 PROCEDURE — 85025 COMPLETE CBC W/AUTO DIFF WBC: CPT

## 2018-06-29 NOTE — Clinical Note
-cbc, cmp, serum free light chains, quantitative immunoglobulins, serum electropheresis, serum immunofixation lab only in [place] in 4 weeks  -same labs, MD appt, zometa on 8/20 (this is already scheduled)

## 2018-06-29 NOTE — PROGRESS NOTES
-PET was neg; hematoloma biopsy   -decrease rev to 5mg EOD then to 2.5 daily  -repeat PET early sept 2018  -cut down naproxyn  -cbc, cmp, serum free light chains, quantitative immunoglobulins, serum electropheresis, serum immunofixation lab only in [place] in 4 weeks   -same labs, MD fenton, zometa on 8/20 (this is already scheduled)

## 2018-06-29 NOTE — PROGRESS NOTES
SECTION OF HEMATOLOGY AND BONE MARROW TRANSPLANT  Return Patient Visit   07/02/2018  Referred by:  No ref. provider found  Referred for:  MM, autoSCT    CHIEF COMPLAINT:   Chief Complaint   Patient presents with    Multiple Myeloma       HISTORY OF PRESENT ILLNESS:   77 yo male with pmh as below, all well controlled on current medications; diagnosis of systemic myeloma, cytogenetic studies normal.  Initially presentedfor cataract surgery.  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  Patient subsequently completed 23 planned fractions of XRT + dex with rad onc in [place].     Both have resulted in improvement in proptosis and vision.  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  PET scan showed known orbit lesion as well as multiple other sites of  Bone disease.    He has completed 9 cycles of VRd and given CR on restaging, age, patient preference, decision was made not to proceed with transplant.  He was transitioned to revlimid maintenance may 2017 and has been tolerating well.   Due to cytopenias we transitioned to 5mg  21 day regimen of a 28 day cycle.   Also with right flank pain at prior appt had CT yesterday that showed RP mass. Pet was done and mildly pet avid so we obtained IR biopsy which he presents to review today.  Feels well. Pain resolved. Tolerating and compliant with revlimid . Comes to clinic with wife and daughter.  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 He has never used smokeless tobacco. He reports that he drinks about 4.2 oz of alcohol per week . He reports that he does not use drugs.    FAMILY HISTORY:  Family History   Problem Relation Age of Onset    Cancer Mother     Prostate cancer Father     Coronary artery disease Father     Dementia Sister     Stroke Brother     Dementia Brother     No Known Problems Brother     Dementia Brother     No Known Problems Brother     Pancreatitis Brother     Cancer Brother     No Known Problems Sister     No Known Problems Sister        CURRENT MEDICATIONS:   Current Outpatient Prescriptions   Medication Sig    acyclovir (ZOVIRAX) 400 MG tablet Take 1 tablet (400 mg total) by mouth 2 (two) times daily.    alfuzosin (UROXATRAL) 10 mg Tb24     aspirin (ECOTRIN) 81 MG EC tablet Take 81 mg by mouth once daily.    carvedilol (COREG) 6.25 MG tablet     cephALEXin (KEFLEX) 500 MG capsule Take 500 mg by mouth every 12 (twelve) hours.    COMBIGAN 0.2-0.5 % Drop     cycloSPORINE (RESTASIS) 0.05 % ophthalmic emulsion Place 0.4 mLs (1 drop total) into both eyes 2 (two) times daily.    dexamethasone (DECADRON) 4 MG Tab     DUREZOL 0.05 % Drop ophthalmic solution     hydrocortisone 1 % cream     ILEVRO 0.3 % DrpS INSTILL ONE DROP INTO LEFT EYE ONCE DAILY    ketoconazole (NIZORAL) 2 % shampoo     latanoprost 0.005 % ophthalmic solution     lenalidomide 5 mg Cap Take 5 mg by mouth once daily. Take on days 1-21 of a 28-day cycle. [employer] auth#[id number] on 06/15/18    levothyroxine (SYNTHROID) 125 MCG tablet     lifitegrast 5 % Dpet Place 1 drop into the right eye 2 (two) times daily.    multivitamin with minerals tablet Take 1 tablet by mouth once daily.    naproxen (NAPROSYN) 500 MG tablet     neomycin-polymyxin-hydrocortisone (CORTISPORIN) otic solution     omeprazole (PRILOSEC) 40 MG capsule     RESTASIS 0.05 % ophthalmic emulsion      rosuvastatin (CRESTOR) 20 MG tablet Take 20 mg by mouth.    timolol maleate 0.5% (TIMOPTIC) 0.5 % Drop     tobramycin-dexamethasone 0.3-0.1% (TOBRADEX) 0.3-0.1 % DrpS Place 1 drop into the right eye 4 (four) times daily.     No current facility-administered medications for this visit.      ALLERGIES:   Review of patient's allergies indicates:   Allergen Reactions    Decongest tabs      All over the counter medications containing decongestive.              REVIEW OF SYSTEMS:   General ROS: negative  Psychological ROS: negative  Ophthalmic ROS: see HPI  ENT ROS: + right eye blurry vision   Allergy and Immunology ROS: negative  Hematological and Lymphatic ROS: negative  Endocrine ROS: negative  Respiratory ROS: negative  Cardiovascular ROS: negative  Gastrointestinal ROS: negative  Genito-Urinary ROS: negative  Musculoskeletal ROS: negative  Neurological ROS: negative  Dermatological ROS: negative    PHYSICAL EXAM:   Vitals:    06/29/18 1307   BP: 139/65   Pulse: (!) 56   Temp: 97.9 °F (36.6 °C)       General - well developed, well nourished, no apparent distress  Head & Face - no sinus tenderness  Eyes - right eye appears red and irritated  ENT - normal external auditory canals and tympanic membranes bilaterally oropharynx clear,  Normal dentition and gums  Neck - normal thyroid  Chest and Lung - normal respiratory effort, clear to auscultation bilaterally   Cardiovascular - RRR with no MGR, normal S1 and S2; no pedal edema  Abdomen -  soft, nontender, no palpable hepatomegaly or splenomegaly  Lymph - no palpable lymphadenopathy  Extremities - unremarkable nails and digits  Heme - no bruising, petechiae, pallor  Skin - no rashes or lesions  Psych - appropriate mood and affect      ECOG Performance Status: (foot note - ECOG PS provided by [employer]cology [employer]) 1 - Symptomatic but completely ambulatory    Karnofsky Performance Score:  90%- Able to Carry on Normal Activity: Minor Symptoms of  Disease  DATA: .  Lab Results   Component Value Date    WBC 2.20 (L) 06/29/2018    HGB 13.3 (L) 06/29/2018    HCT 40.2 06/29/2018    MCV 91 06/29/2018    PLT 90 (L) 06/29/2018     Gran # (ANC)   Date Value Ref Range Status   06/29/2018 0.6 (L) 1.8 - 7.7 K/uL Final     Gran%   Date Value Ref Range Status   06/29/2018 29.0 (L) 38.0 - 73.0 % Final     Lymph #   Date Value Ref Range Status   06/29/2018 0.9 (L) 1.0 - 4.8 K/uL Final     Lymph%   Date Value Ref Range Status   06/29/2018 42.3 18.0 - 48.0 % Final     CMP  Sodium   Date Value Ref Range Status   06/29/2018 138 136 - 145 mmol/L Final     Potassium   Date Value Ref Range Status   06/29/2018 4.6 3.5 - 5.1 mmol/L Final     Chloride   Date Value Ref Range Status   06/29/2018 108 95 - 110 mmol/L Final     CO2   Date Value Ref Range Status   06/29/2018 25 23 - 29 mmol/L Final     Glucose   Date Value Ref Range Status   06/29/2018 105 70 - 110 mg/dL Final     BUN, Bld   Date Value Ref Range Status   06/29/2018 24 (H) 8 - 23 mg/dL Final     Creatinine   Date Value Ref Range Status   06/29/2018 1.1 0.5 - 1.4 mg/dL Final     Calcium   Date Value Ref Range Status   06/29/2018 9.0 8.7 - 10.5 mg/dL Final     Total Protein   Date Value Ref Range Status   06/29/2018 5.9 (L) 6.0 - 8.4 g/dL Final     Albumin   Date Value Ref Range Status   06/29/2018 3.5 3.5 - 5.2 g/dL Final     Total Bilirubin   Date Value Ref Range Status   06/29/2018 0.5 0.1 - 1.0 mg/dL Final     Comment:     For infants and newborns, interpretation of results should be based  on gestational age, weight and in agreement with clinical  observations.  Premature Infant recommended reference ranges:  Up to 24 hours.............<8.0 mg/dL  Up to 48 hours............<12.0 mg/dL  3-5 days..................<15.0 mg/dL  6-29 days.................<15.0 mg/dL       Alkaline Phosphatase   Date Value Ref Range Status   06/29/2018 48 (L) 55 - 135 U/L Final     AST   Date Value Ref Range Status   06/29/2018 13 10 - 40 U/L  Final     ALT   Date Value Ref Range Status   06/29/2018 18 10 - 44 U/L Final     Anion Gap   Date Value Ref Range Status   06/29/2018 5 (L) 8 - 16 mmol/L Final     eGFR if    Date Value Ref Range Status   06/29/2018 >60.0 >60 mL/min/1.73 m^2 Final     eGFR if non    Date Value Ref Range Status   06/29/2018 >60.0 >60 mL/min/1.73 m^2 Final     Comment:     Calculation used to obtain the estimated glomerular filtration  rate (eGFR) is the CKD-EPI equation.        IgG - Serum   Date Value Ref Range Status   06/29/2018 729 650 - 1600 mg/dL Final     Comment:     IgG Cord Blood Reference Range: 650-1600 mg/dL.     IgA   Date Value Ref Range Status   06/29/2018 119 40 - 350 mg/dL Final     Comment:     IgA Cord Blood Reference Range: <5 mg/dL.     IgM   Date Value Ref Range Status   06/29/2018 37 (L) 50 - 300 mg/dL Final     Comment:     IgM Cord Blood Reference Range: <25 mg/dL.     Kappa Free Light Chains   Date Value Ref Range Status   06/29/2018 2.39 (H) 0.33 - 1.94 mg/dL Final   05/14/2018 1.88 0.33 - 1.94 mg/dL Final   04/10/2018 1.94 0.33 - 1.94 mg/dL Final     Lambda Free Light Chains   Date Value Ref Range Status   06/29/2018 2.40 0.57 - 2.63 mg/dL Final   05/14/2018 1.88 0.57 - 2.63 mg/dL Final   04/10/2018 1.66 0.57 - 2.63 mg/dL Final     Kappa/Lambda FLC Ratio   Date Value Ref Range Status   06/29/2018 1.00 0.26 - 1.65 Final   05/14/2018 1.00 0.26 - 1.65 Final   04/10/2018 1.17 0.26 - 1.65 Final         PLEASE SEE EPIC MEDIA TAB FOR ALL IMAGING, PATHOLOGY REPORTS FROM OSH  ASSESSMENT AND PLAN:   Encounter Diagnoses   Name Primary?   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   -completed  9 cycles of VRd and achieved a IMWG CR on restaging pet, bone marrow  biopsy, and biochemical studies may 2017 staging; transitioned to revlimid maintenance 10mg daily may 2017; remains in remission as of   May 2018 BCS; today's studies pending   -repeat SS jan 2018 for baseline plain film imaging; no symptoms or labs to suggest these are active lesions; next due jan 2019  -due to persistent cytopenias despite transitioned to 21 day cycle with decreased revlimid to 5mg day 1-21 of 28 day cycle (1/15/17);  Given persistent cytopenias plan to decrease to 5mg EOD and once this prescription is out will transition to 2.5mg daily   -continue fu with local opthalmology for eye irritation; suspect residual effect from radiation and not related to myeloma    -supportive meds:   asa, bisphosphanate q 3 months x 1 year; dose 1 of 4  (1/15/17); next 2 of 4 today; dose 3 of 4 in 3 months   -biopsy of pet RP mass cw hematoma; will get fu pet sept 2018 to ensure pet avid mass was not false negative   -decreased naproxyn use due to RP bleed    Follow Up:  -cbc, cmp, serum free light chains, quantitative immunoglobulins, serum electropheresis, serum immunofixation lab only in [place] in 4 weeks to monitor cytopenias  -same labs, MD appt, zometa on 8/20 (this is already scheduled) ; prescribe 2.5mg rev dosing at this appt   Bernardo Park MD  Hematology/Oncology/Bone Marrow Transplant

## 2018-07-02 LAB
ALBUMIN SERPL ELPH-MCNC: 3.46 G/DL
ALPHA1 GLOB SERPL ELPH-MCNC: 0.24 G/DL
ALPHA2 GLOB SERPL ELPH-MCNC: 0.64 G/DL
B-GLOBULIN SERPL ELPH-MCNC: 0.55 G/DL
GAMMA GLOB SERPL ELPH-MCNC: 0.71 G/DL
INTERPRETATION SERPL IFE-IMP: NORMAL
KAPPA LC SER QL IA: 2.39 MG/DL
KAPPA LC/LAMBDA SER IA: 1
LAMBDA LC SER QL IA: 2.4 MG/DL
PATHOLOGIST INTERPRETATION IFE: NORMAL
PATHOLOGIST INTERPRETATION SPE: NORMAL
PROT SERPL-MCNC: 5.6 G/DL

## 2018-07-03 ENCOUNTER — TELEPHONE (OUTPATIENT)
Dept: HEMATOLOGY/ONCOLOGY | Facility: CLINIC | Age: 78
End: 2018-07-03

## 2018-07-03 NOTE — TELEPHONE ENCOUNTER
Spoke to daughter and informed her that her father's myeloma is in remission and Dr Park would like him to continue on his Revlimid. Verbalized understanding

## 2018-07-17 DIAGNOSIS — C90.01 MULTIPLE MYELOMA IN REMISSION: Primary | ICD-10-CM

## 2018-07-18 RX ORDER — LENALIDOMIDE 5 MG/1
5 CAPSULE ORAL EVERY OTHER DAY
Qty: 14 EACH | Refills: 0 | Status: SHIPPED | OUTPATIENT
Start: 2018-07-18 | End: 2018-08-06

## 2018-07-25 LAB — FUNGUS SPEC CULT: NORMAL

## 2018-07-27 ENCOUNTER — LAB VISIT (OUTPATIENT)
Dept: LAB | Facility: HOSPITAL | Age: 78
End: 2018-07-27
Attending: INTERNAL MEDICINE
Payer: MEDICARE

## 2018-07-27 DIAGNOSIS — C90.01 MULTIPLE MYELOMA IN REMISSION: ICD-10-CM

## 2018-07-27 LAB
ALBUMIN SERPL BCP-MCNC: 3.6 G/DL
ALP SERPL-CCNC: 43 U/L
ALT SERPL W/O P-5'-P-CCNC: 18 U/L
ANION GAP SERPL CALC-SCNC: 5 MMOL/L
AST SERPL-CCNC: 16 U/L
BASOPHILS # BLD AUTO: ABNORMAL K/UL
BASOPHILS NFR BLD: 0 %
BILIRUB SERPL-MCNC: 0.7 MG/DL
BUN SERPL-MCNC: 26 MG/DL
CALCIUM SERPL-MCNC: 9.3 MG/DL
CHLORIDE SERPL-SCNC: 109 MMOL/L
CO2 SERPL-SCNC: 25 MMOL/L
CREAT SERPL-MCNC: 1.1 MG/DL
DIFFERENTIAL METHOD: ABNORMAL
EOSINOPHIL # BLD AUTO: ABNORMAL K/UL
EOSINOPHIL NFR BLD: 2 %
ERYTHROCYTE [DISTWIDTH] IN BLOOD BY AUTOMATED COUNT: 14.2 %
EST. GFR  (AFRICAN AMERICAN): >60 ML/MIN/1.73 M^2
EST. GFR  (NON AFRICAN AMERICAN): >60 ML/MIN/1.73 M^2
GLUCOSE SERPL-MCNC: 109 MG/DL
HCT VFR BLD AUTO: 42.2 %
HGB BLD-MCNC: 14 G/DL
IGA SERPL-MCNC: 115 MG/DL
IGG SERPL-MCNC: 753 MG/DL
IGM SERPL-MCNC: 36 MG/DL
LYMPHOCYTES # BLD AUTO: ABNORMAL K/UL
LYMPHOCYTES NFR BLD: 36 %
MCH RBC QN AUTO: 29.9 PG
MCHC RBC AUTO-ENTMCNC: 33.2 G/DL
MCV RBC AUTO: 90 FL
MONOCYTES # BLD AUTO: ABNORMAL K/UL
MONOCYTES NFR BLD: 18 %
NEUTROPHILS NFR BLD: 41 %
NEUTS BAND NFR BLD MANUAL: 3 %
PLATELET # BLD AUTO: 95 K/UL
PMV BLD AUTO: 9.9 FL
POTASSIUM SERPL-SCNC: 4.6 MMOL/L
PROT SERPL-MCNC: 6.1 G/DL
RBC # BLD AUTO: 4.68 M/UL
SODIUM SERPL-SCNC: 139 MMOL/L
WBC # BLD AUTO: 2.23 K/UL

## 2018-07-27 PROCEDURE — 84165 PROTEIN E-PHORESIS SERUM: CPT

## 2018-07-27 PROCEDURE — 82784 ASSAY IGA/IGD/IGG/IGM EACH: CPT | Mod: 59

## 2018-07-27 PROCEDURE — 84165 PROTEIN E-PHORESIS SERUM: CPT | Mod: 26,,, | Performed by: PATHOLOGY

## 2018-07-27 PROCEDURE — 85007 BL SMEAR W/DIFF WBC COUNT: CPT

## 2018-07-27 PROCEDURE — 83520 IMMUNOASSAY QUANT NOS NONAB: CPT | Mod: 59

## 2018-07-27 PROCEDURE — 85027 COMPLETE CBC AUTOMATED: CPT

## 2018-07-27 PROCEDURE — 86334 IMMUNOFIX E-PHORESIS SERUM: CPT | Mod: 26,,, | Performed by: PATHOLOGY

## 2018-07-27 PROCEDURE — 80053 COMPREHEN METABOLIC PANEL: CPT

## 2018-07-27 PROCEDURE — 36415 COLL VENOUS BLD VENIPUNCTURE: CPT

## 2018-07-27 PROCEDURE — 86334 IMMUNOFIX E-PHORESIS SERUM: CPT

## 2018-07-30 LAB
ALBUMIN SERPL ELPH-MCNC: 3.53 G/DL
ALPHA1 GLOB SERPL ELPH-MCNC: 0.23 G/DL
ALPHA2 GLOB SERPL ELPH-MCNC: 0.58 G/DL
B-GLOBULIN SERPL ELPH-MCNC: 0.55 G/DL
GAMMA GLOB SERPL ELPH-MCNC: 0.7 G/DL
INTERPRETATION SERPL IFE-IMP: NORMAL
KAPPA LC SER QL IA: 1.95 MG/DL
KAPPA LC/LAMBDA SER IA: 0.92
LAMBDA LC SER QL IA: 2.11 MG/DL
PROT SERPL-MCNC: 5.6 G/DL

## 2018-07-31 LAB
PATHOLOGIST INTERPRETATION IFE: NORMAL
PATHOLOGIST INTERPRETATION SPE: NORMAL

## 2018-08-06 DIAGNOSIS — C90.01 MULTIPLE MYELOMA IN REMISSION: Primary | ICD-10-CM

## 2018-08-06 RX ORDER — LENALIDOMIDE 2.5 MG/1
2.5 CAPSULE ORAL DAILY
Qty: 28 CAPSULE | Refills: 0 | Status: SHIPPED | OUTPATIENT
Start: 2018-08-06 | End: 2018-08-16 | Stop reason: SDUPTHER

## 2018-08-16 DIAGNOSIS — C90.01 MULTIPLE MYELOMA IN REMISSION: ICD-10-CM

## 2018-08-16 RX ORDER — LENALIDOMIDE 2.5 MG/1
2.5 CAPSULE ORAL DAILY
Qty: 28 CAPSULE | Refills: 0 | Status: SHIPPED | OUTPATIENT
Start: 2018-08-16 | End: 2018-09-19 | Stop reason: SDUPTHER

## 2018-08-20 ENCOUNTER — INFUSION (OUTPATIENT)
Dept: INFUSION THERAPY | Facility: HOSPITAL | Age: 78
End: 2018-08-20
Attending: INTERNAL MEDICINE
Payer: MEDICARE

## 2018-08-20 ENCOUNTER — LAB VISIT (OUTPATIENT)
Dept: LAB | Facility: HOSPITAL | Age: 78
End: 2018-08-20
Attending: INTERNAL MEDICINE
Payer: MEDICARE

## 2018-08-20 ENCOUNTER — OFFICE VISIT (OUTPATIENT)
Dept: HEMATOLOGY/ONCOLOGY | Facility: CLINIC | Age: 78
End: 2018-08-20
Payer: MEDICARE

## 2018-08-20 ENCOUNTER — TELEPHONE (OUTPATIENT)
Dept: HEMATOLOGY/ONCOLOGY | Facility: CLINIC | Age: 78
End: 2018-08-20

## 2018-08-20 VITALS
DIASTOLIC BLOOD PRESSURE: 63 MMHG | RESPIRATION RATE: 16 BRPM | SYSTOLIC BLOOD PRESSURE: 132 MMHG | HEART RATE: 52 BPM | TEMPERATURE: 98 F

## 2018-08-20 VITALS
HEIGHT: 70 IN | SYSTOLIC BLOOD PRESSURE: 128 MMHG | OXYGEN SATURATION: 97 % | TEMPERATURE: 98 F | RESPIRATION RATE: 18 BRPM | BODY MASS INDEX: 23.64 KG/M2 | DIASTOLIC BLOOD PRESSURE: 60 MMHG | WEIGHT: 165.13 LBS | HEART RATE: 55 BPM

## 2018-08-20 DIAGNOSIS — D75.9 CYTOPENIA: ICD-10-CM

## 2018-08-20 DIAGNOSIS — C90.01 MULTIPLE MYELOMA IN REMISSION: ICD-10-CM

## 2018-08-20 DIAGNOSIS — C90.01 MULTIPLE MYELOMA IN REMISSION: Primary | ICD-10-CM

## 2018-08-20 LAB
ALBUMIN SERPL BCP-MCNC: 3.5 G/DL
ALP SERPL-CCNC: 46 U/L
ALT SERPL W/O P-5'-P-CCNC: 19 U/L
ANION GAP SERPL CALC-SCNC: 6 MMOL/L
AST SERPL-CCNC: 16 U/L
BASOPHILS # BLD AUTO: 0.02 K/UL
BASOPHILS NFR BLD: 0.7 %
BILIRUB SERPL-MCNC: 0.7 MG/DL
BUN SERPL-MCNC: 26 MG/DL
CALCIUM SERPL-MCNC: 9.9 MG/DL
CHLORIDE SERPL-SCNC: 103 MMOL/L
CO2 SERPL-SCNC: 28 MMOL/L
CREAT SERPL-MCNC: 1 MG/DL
DIFFERENTIAL METHOD: ABNORMAL
EOSINOPHIL # BLD AUTO: 0.1 K/UL
EOSINOPHIL NFR BLD: 3.1 %
ERYTHROCYTE [DISTWIDTH] IN BLOOD BY AUTOMATED COUNT: 13.4 %
EST. GFR  (AFRICAN AMERICAN): >60 ML/MIN/1.73 M^2
EST. GFR  (NON AFRICAN AMERICAN): >60 ML/MIN/1.73 M^2
GLUCOSE SERPL-MCNC: 103 MG/DL
HCT VFR BLD AUTO: 45.8 %
HGB BLD-MCNC: 14.7 G/DL
IGA SERPL-MCNC: 137 MG/DL
IGG SERPL-MCNC: 868 MG/DL
IGM SERPL-MCNC: 43 MG/DL
IMM GRANULOCYTES # BLD AUTO: 0.01 K/UL
IMM GRANULOCYTES NFR BLD AUTO: 0.3 %
LYMPHOCYTES # BLD AUTO: 1 K/UL
LYMPHOCYTES NFR BLD: 32.7 %
MCH RBC QN AUTO: 29.1 PG
MCHC RBC AUTO-ENTMCNC: 32.1 G/DL
MCV RBC AUTO: 91 FL
MONOCYTES # BLD AUTO: 0.5 K/UL
MONOCYTES NFR BLD: 18.4 %
NEUTROPHILS # BLD AUTO: 1.3 K/UL
NEUTROPHILS NFR BLD: 44.8 %
NRBC BLD-RTO: 0 /100 WBC
PLATELET # BLD AUTO: 109 K/UL
PMV BLD AUTO: 11 FL
POTASSIUM SERPL-SCNC: 4.6 MMOL/L
PROT SERPL-MCNC: 6.3 G/DL
RBC # BLD AUTO: 5.05 M/UL
SODIUM SERPL-SCNC: 137 MMOL/L
WBC # BLD AUTO: 2.94 K/UL

## 2018-08-20 PROCEDURE — 99215 OFFICE O/P EST HI 40 MIN: CPT | Mod: PBBFAC,25 | Performed by: INTERNAL MEDICINE

## 2018-08-20 PROCEDURE — 85025 COMPLETE CBC W/AUTO DIFF WBC: CPT

## 2018-08-20 PROCEDURE — 82784 ASSAY IGA/IGD/IGG/IGM EACH: CPT | Mod: 59

## 2018-08-20 PROCEDURE — 86334 IMMUNOFIX E-PHORESIS SERUM: CPT

## 2018-08-20 PROCEDURE — 83520 IMMUNOASSAY QUANT NOS NONAB: CPT

## 2018-08-20 PROCEDURE — 84165 PROTEIN E-PHORESIS SERUM: CPT | Mod: 26,,, | Performed by: PATHOLOGY

## 2018-08-20 PROCEDURE — 25000003 PHARM REV CODE 250: Performed by: INTERNAL MEDICINE

## 2018-08-20 PROCEDURE — 63600175 PHARM REV CODE 636 W HCPCS: Performed by: INTERNAL MEDICINE

## 2018-08-20 PROCEDURE — 80053 COMPREHEN METABOLIC PANEL: CPT

## 2018-08-20 PROCEDURE — 99215 OFFICE O/P EST HI 40 MIN: CPT | Mod: S$PBB,,, | Performed by: INTERNAL MEDICINE

## 2018-08-20 PROCEDURE — 99999 PR PBB SHADOW E&M-EST. PATIENT-LVL V: CPT | Mod: PBBFAC,,, | Performed by: INTERNAL MEDICINE

## 2018-08-20 PROCEDURE — 96365 THER/PROPH/DIAG IV INF INIT: CPT

## 2018-08-20 PROCEDURE — 86334 IMMUNOFIX E-PHORESIS SERUM: CPT | Mod: 26,,, | Performed by: PATHOLOGY

## 2018-08-20 PROCEDURE — 84165 PROTEIN E-PHORESIS SERUM: CPT

## 2018-08-20 PROCEDURE — 36415 COLL VENOUS BLD VENIPUNCTURE: CPT

## 2018-08-20 RX ORDER — HEPARIN 100 UNIT/ML
500 SYRINGE INTRAVENOUS
Status: CANCELLED | OUTPATIENT
Start: 2018-08-20

## 2018-08-20 RX ORDER — SODIUM CHLORIDE 0.9 % (FLUSH) 0.9 %
10 SYRINGE (ML) INJECTION
Status: CANCELLED | OUTPATIENT
Start: 2018-08-20

## 2018-08-20 RX ORDER — SODIUM CHLORIDE 0.9 % (FLUSH) 0.9 %
10 SYRINGE (ML) INJECTION
Status: DISCONTINUED | OUTPATIENT
Start: 2018-08-20 | End: 2018-08-20 | Stop reason: HOSPADM

## 2018-08-20 RX ORDER — HEPARIN 100 UNIT/ML
500 SYRINGE INTRAVENOUS
Status: DISCONTINUED | OUTPATIENT
Start: 2018-08-20 | End: 2018-08-20 | Stop reason: HOSPADM

## 2018-08-20 RX ADMIN — SODIUM CHLORIDE 4 MG: 9 INJECTION, SOLUTION INTRAVENOUS at 11:08

## 2018-08-20 NOTE — PLAN OF CARE
Problem: Patient Care Overview  Goal: Discharge Needs Assessment  Outcome: Ongoing (interventions implemented as appropriate)  Tolerated zometa infusion well no adverse reaction pt reports taking po vitamin d and nova at home no up coming dental work or jaw pain reported. Encouraged to stay well hydrated. Will report muscle twitching SOB fever and any other concerns to provider. Leaves clinic ambulatory avs given NAD noted PIV d/c'd cath tip intact site covered with 2x2 gauze and coban.

## 2018-08-20 NOTE — PROGRESS NOTES
SECTION OF HEMATOLOGY AND BONE MARROW TRANSPLANT  Return Patient Visit   08/22/2018  Referred by:  No ref. provider found  Referred for:  MM, autoSCT    CHIEF COMPLAINT:   No chief complaint on file.      HISTORY OF PRESENT ILLNESS:   77 yo male with pmh as below, ; diagnosis of systemic myeloma, cytogenetic studies normal.  Initially presentedfor cataract surgery.  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  Patient subsequently completed 23 planned fractions of XRT + dex with rad onc in [place].     Both have resulted in improvement in proptosis and vision.  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  PET scan showed known orbit lesion as well as multiple other sites of  Bone disease.    He has completed 9 cycles of VRd and given CR on restaging, age, patient preference, decision was made not to proceed with transplant.  He was transitioned to revlimid maintenance may 2017 and has been tolerating well with mild cytopenias and remained in biochemical remission since that time.  .   Due to cytopenias we transitioned to 5mg  21 day regimen of a 28 day cycle.   Feels well. Pain resolved. Tolerating and compliant with revlimid . Comes to clinic with wife and son.    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 he has never used smokeless tobacco. He reports that he drinks about 4.2 oz of alcohol per week. He  reports that he does not use drugs.    FAMILY HISTORY:  Family History   Problem Relation Age of Onset    Cancer Mother     Prostate cancer Father     Coronary artery disease Father     Dementia Sister     Stroke Brother     Dementia Brother     No Known Problems Brother     Dementia Brother     No Known Problems Brother     Pancreatitis Brother     Cancer Brother     No Known Problems Sister     No Known Problems Sister        CURRENT MEDICATIONS:   Current Outpatient Medications   Medication Sig    acyclovir (ZOVIRAX) 400 MG tablet Take 1 tablet (400 mg total) by mouth 2 (two) times daily.    alfuzosin (UROXATRAL) 10 mg Tb24     aspirin (ECOTRIN) 81 MG EC tablet Take 81 mg by mouth once daily.    carvedilol (COREG) 6.25 MG tablet     cephALEXin (KEFLEX) 500 MG capsule Take 500 mg by mouth every 12 (twelve) hours.    COMBIGAN 0.2-0.5 % Drop     cycloSPORINE (RESTASIS) 0.05 % ophthalmic emulsion Place 0.4 mLs (1 drop total) into both eyes 2 (two) times daily.    dexamethasone (DECADRON) 4 MG Tab     DUREZOL 0.05 % Drop ophthalmic solution     hydrocortisone 1 % cream     ILEVRO 0.3 % DrpS INSTILL ONE DROP INTO LEFT EYE ONCE DAILY    ketoconazole (NIZORAL) 2 % shampoo     latanoprost 0.005 % ophthalmic solution     lenalidomide (REVLIMID) 2.5 mg Cap Take 2.5 mg by mouth once daily. auth number [id number] 8/16/2018    levothyroxine (SYNTHROID) 125 MCG tablet     lifitegrast 5 % Dpet Place 1 drop into the right eye 2 (two) times daily.    multivitamin with minerals tablet Take 1 tablet by mouth once daily.    multivitamin with minerals tablet Take 1 tablet by mouth.    naproxen (NAPROSYN) 500 MG tablet     neomycin-polymyxin-hydrocortisone (CORTISPORIN) otic solution     omeprazole (PRILOSEC) 40 MG capsule     RESTASIS 0.05 % ophthalmic emulsion     rosuvastatin (CRESTOR) 20 MG tablet Take 20 mg by mouth.    timolol maleate 0.5% (TIMOPTIC) 0.5 % Drop     tobramycin-dexamethasone  0.3-0.1% (TOBRADEX) 0.3-0.1 % DrpS Place 1 drop into the right eye 4 (four) times daily.     No current facility-administered medications for this visit.      ALLERGIES:   Review of patient's allergies indicates:   Allergen Reactions    Decongest tabs      All over the counter medications containing decongestive.              REVIEW OF SYSTEMS:   General ROS: negative  Psychological ROS: negative  Ophthalmic ROS: see HPI  ENT ROS: + right eye blurry vision   Allergy and Immunology ROS: negative  Hematological and Lymphatic ROS: negative  Endocrine ROS: negative  Respiratory ROS: negative  Cardiovascular ROS: negative  Gastrointestinal ROS: negative  Genito-Urinary ROS: negative  Musculoskeletal ROS: negative  Neurological ROS: negative  Dermatological ROS: negative    PHYSICAL EXAM:   Vitals:    08/20/18 1001   BP: 128/60   Pulse: (!) 55   Resp: 18   Temp: 97.8 °F (36.6 °C)       General - well developed, well nourished, no apparent distress  Head & Face - no sinus tenderness  Eyes - right eye appears red and irritated  ENT - normal external auditory canals and tympanic membranes bilaterally oropharynx clear,  Normal dentition and gums  Neck - normal thyroid  Chest and Lung - normal respiratory effort, clear to auscultation bilaterally   Cardiovascular - RRR with no MGR, normal S1 and S2; no pedal edema  Abdomen -  soft, nontender, no palpable hepatomegaly or splenomegaly  Lymph - no palpable lymphadenopathy  Extremities - unremarkable nails and digits  Heme - no bruising, petechiae, pallor  Skin - no rashes or lesions  Psych - appropriate mood and affect      ECOG Performance Status: (foot note - ECOG PS provided by Eastern Cooperative Oncology Group) 1 - Symptomatic but completely ambulatory    Karnofsky Performance Score:  90%- Able to Carry on Normal Activity: Minor Symptoms of Disease  DATA: .  Lab Results   Component Value Date    WBC 2.94 (L) 08/20/2018    HGB 14.7 08/20/2018    HCT 45.8 08/20/2018    MCV 91  08/20/2018     (L) 08/20/2018     Gran # (ANC)   Date Value Ref Range Status   08/20/2018 1.3 (L) 1.8 - 7.7 K/uL Final     Gran%   Date Value Ref Range Status   08/20/2018 44.8 38.0 - 73.0 % Final     Lymph #   Date Value Ref Range Status   08/20/2018 1.0 1.0 - 4.8 K/uL Final     Lymph%   Date Value Ref Range Status   08/20/2018 32.7 18.0 - 48.0 % Final     CMP  Sodium   Date Value Ref Range Status   08/20/2018 137 136 - 145 mmol/L Final     Potassium   Date Value Ref Range Status   08/20/2018 4.6 3.5 - 5.1 mmol/L Final     Chloride   Date Value Ref Range Status   08/20/2018 103 95 - 110 mmol/L Final     CO2   Date Value Ref Range Status   08/20/2018 28 23 - 29 mmol/L Final     Glucose   Date Value Ref Range Status   08/20/2018 103 70 - 110 mg/dL Final     BUN, Bld   Date Value Ref Range Status   08/20/2018 26 (H) 8 - 23 mg/dL Final     Creatinine   Date Value Ref Range Status   08/20/2018 1.0 0.5 - 1.4 mg/dL Final     Calcium   Date Value Ref Range Status   08/20/2018 9.9 8.7 - 10.5 mg/dL Final     Total Protein   Date Value Ref Range Status   08/20/2018 6.3 6.0 - 8.4 g/dL Final     Albumin   Date Value Ref Range Status   08/20/2018 3.5 3.5 - 5.2 g/dL Final     Total Bilirubin   Date Value Ref Range Status   08/20/2018 0.7 0.1 - 1.0 mg/dL Final     Comment:     For infants and newborns, interpretation of results should be based  on gestational age, weight and in agreement with clinical  observations.  Premature Infant recommended reference ranges:  Up to 24 hours.............<8.0 mg/dL  Up to 48 hours............<12.0 mg/dL  3-5 days..................<15.0 mg/dL  6-29 days.................<15.0 mg/dL       Alkaline Phosphatase   Date Value Ref Range Status   08/20/2018 46 (L) 55 - 135 U/L Final     AST   Date Value Ref Range Status   08/20/2018 16 10 - 40 U/L Final     ALT   Date Value Ref Range Status   08/20/2018 19 10 - 44 U/L Final     Anion Gap   Date Value Ref Range Status   08/20/2018 6 (L) 8 - 16  mmol/L Final     eGFR if    Date Value Ref Range Status   08/20/2018 >60.0 >60 mL/min/1.73 m^2 Final     eGFR if non    Date Value Ref Range Status   08/20/2018 >60.0 >60 mL/min/1.73 m^2 Final     Comment:     Calculation used to obtain the estimated glomerular filtration  rate (eGFR) is the CKD-EPI equation.        IgG - Serum   Date Value Ref Range Status   08/20/2018 868 650 - 1600 mg/dL Final     Comment:     IgG Cord Blood Reference Range: 650-1600 mg/dL.     IgA   Date Value Ref Range Status   08/20/2018 137 40 - 350 mg/dL Final     Comment:     IgA Cord Blood Reference Range: <5 mg/dL.     IgM   Date Value Ref Range Status   08/20/2018 43 (L) 50 - 300 mg/dL Final     Comment:     IgM Cord Blood Reference Range: <25 mg/dL.     Kappa Free Light Chains   Date Value Ref Range Status   08/20/2018 2.98 (H) 0.33 - 1.94 mg/dL Final   07/27/2018 1.95 (H) 0.33 - 1.94 mg/dL Final   06/29/2018 2.39 (H) 0.33 - 1.94 mg/dL Final     Lambda Free Light Chains   Date Value Ref Range Status   08/20/2018 2.84 (H) 0.57 - 2.63 mg/dL Final   07/27/2018 2.11 0.57 - 2.63 mg/dL Final   06/29/2018 2.40 0.57 - 2.63 mg/dL Final     Kappa/Lambda FLC Ratio   Date Value Ref Range Status   08/20/2018 1.05 0.26 - 1.65 Final   07/27/2018 0.92 0.26 - 1.65 Final   06/29/2018 1.00 0.26 - 1.65 Final         PLEASE SEE EPIC MEDIA TAB FOR ALL IMAGING, PATHOLOGY REPORTS FROM OSH  ASSESSMENT AND PLAN:   Encounter Diagnoses   Name Primary?   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   -completed  9 cycles of VRd and achieved a IMWG CR on restaging pet, bone marrow biopsy, and biochemical studies may 2017 staging; transitioned to revlimid maintenance  may 2017; remains in remission as of  todays    BCS;    -repeat SS jan 2018 for baseline plain film  imaging; no symptoms or labs to suggest these are active lesions; next due jan 2019  -due to persistent cytopenias despite transitioned to 21 day cycle with decreased revlimid to 5mg day 1-21 of 28 day cycle (1/15/17);  Given persistent cytopenias transitioned to 2.5mg maintenance day 1-21 July 2018, continue  -continue fu with local opthalmology for eye irritation; suspect residual effect from radiation and not related to myeloma    -supportive meds:   asa, bisphosphanate q 3 months x 1 year; dose 3 of 4 today; last dose  due nov 21 2019  -biopsy of pet RP mass cw hematoma   -decreased naproxyn use due to RP bleed    Follow Up:  -cbc, cmp, serum free light chains, quantitative immunoglobulins, serum electropheresis, serum immunofixation lab only in [place] in 1 month  -same labs and MD appt at [facility] [place]   Bernardo Park MD  Hematology/Oncology/Bone Marrow Transplant       -cbc, cmp, serum free light chains, quantitative immunoglobulins, serum electropheresis, serum immunofixation lab in [place] in 1 month  -[place] labs [place] [facility] [place]

## 2018-08-20 NOTE — Clinical Note
-cbc, cmp, serum free light chains, quantitative immunoglobulins, serum electropheresis, serum immunofixation lab in [place] in 1 month-Same labs and MD [place] in 2 [place] [facility] [place]

## 2018-08-21 LAB
ALBUMIN SERPL ELPH-MCNC: 3.81 G/DL
ALPHA1 GLOB SERPL ELPH-MCNC: 0.26 G/DL
ALPHA2 GLOB SERPL ELPH-MCNC: 0.64 G/DL
B-GLOBULIN SERPL ELPH-MCNC: 0.62 G/DL
GAMMA GLOB SERPL ELPH-MCNC: 0.87 G/DL
INTERPRETATION SERPL IFE-IMP: NORMAL
KAPPA LC SER QL IA: 2.98 MG/DL
KAPPA LC/LAMBDA SER IA: 1.05
LAMBDA LC SER QL IA: 2.84 MG/DL
PATHOLOGIST INTERPRETATION IFE: NORMAL
PATHOLOGIST INTERPRETATION SPE: NORMAL
PROT SERPL-MCNC: 6.2 G/DL

## 2018-08-22 ENCOUNTER — TELEPHONE (OUTPATIENT)
Dept: HEMATOLOGY/ONCOLOGY | Facility: CLINIC | Age: 78
End: 2018-08-22

## 2018-08-23 LAB
ACID FAST MOD KINY STN SPEC: NORMAL
MYCOBACTERIUM SPEC QL CULT: NORMAL

## 2018-09-19 DIAGNOSIS — C90.01 MULTIPLE MYELOMA IN REMISSION: ICD-10-CM

## 2018-09-19 RX ORDER — LENALIDOMIDE 2.5 MG/1
2.5 CAPSULE ORAL DAILY
Qty: 28 CAPSULE | Refills: 0 | Status: SHIPPED | OUTPATIENT
Start: 2018-09-19 | End: 2018-10-26 | Stop reason: SDUPTHER

## 2018-09-21 ENCOUNTER — LAB VISIT (OUTPATIENT)
Dept: LAB | Facility: HOSPITAL | Age: 78
End: 2018-09-21
Attending: INTERNAL MEDICINE
Payer: MEDICARE

## 2018-09-21 DIAGNOSIS — C90.01 MULTIPLE MYELOMA IN REMISSION: ICD-10-CM

## 2018-09-21 LAB
ALBUMIN SERPL BCP-MCNC: 3.7 G/DL
ALP SERPL-CCNC: 50 U/L
ALT SERPL W/O P-5'-P-CCNC: 20 U/L
ANION GAP SERPL CALC-SCNC: 10 MMOL/L
AST SERPL-CCNC: 16 U/L
BASOPHILS # BLD AUTO: 0.01 K/UL
BASOPHILS NFR BLD: 0.3 %
BILIRUB SERPL-MCNC: 0.6 MG/DL
BUN SERPL-MCNC: 23 MG/DL
CALCIUM SERPL-MCNC: 9.6 MG/DL
CHLORIDE SERPL-SCNC: 105 MMOL/L
CO2 SERPL-SCNC: 22 MMOL/L
CREAT SERPL-MCNC: 1.2 MG/DL
DIFFERENTIAL METHOD: ABNORMAL
EOSINOPHIL # BLD AUTO: 0.1 K/UL
EOSINOPHIL NFR BLD: 4 %
ERYTHROCYTE [DISTWIDTH] IN BLOOD BY AUTOMATED COUNT: 13.8 %
EST. GFR  (AFRICAN AMERICAN): >60 ML/MIN/1.73 M^2
EST. GFR  (NON AFRICAN AMERICAN): 58 ML/MIN/1.73 M^2
GLUCOSE SERPL-MCNC: 97 MG/DL
HCT VFR BLD AUTO: 43.4 %
HGB BLD-MCNC: 14.5 G/DL
IGA SERPL-MCNC: 147 MG/DL
IGG SERPL-MCNC: 1020 MG/DL
IGM SERPL-MCNC: 45 MG/DL
LYMPHOCYTES # BLD AUTO: 1.1 K/UL
LYMPHOCYTES NFR BLD: 35.4 %
MCH RBC QN AUTO: 29.5 PG
MCHC RBC AUTO-ENTMCNC: 33.4 G/DL
MCV RBC AUTO: 88 FL
MONOCYTES # BLD AUTO: 0.6 K/UL
MONOCYTES NFR BLD: 18.9 %
NEUTROPHILS # BLD AUTO: 1.3 K/UL
NEUTROPHILS NFR BLD: 41.4 %
PLATELET # BLD AUTO: 103 K/UL
PMV BLD AUTO: 10.7 FL
POTASSIUM SERPL-SCNC: 4.4 MMOL/L
PROT SERPL-MCNC: 6.5 G/DL
RBC # BLD AUTO: 4.92 M/UL
SODIUM SERPL-SCNC: 137 MMOL/L
WBC # BLD AUTO: 3.02 K/UL

## 2018-09-21 PROCEDURE — 85025 COMPLETE CBC W/AUTO DIFF WBC: CPT

## 2018-09-21 PROCEDURE — 84165 PROTEIN E-PHORESIS SERUM: CPT | Mod: 26,,, | Performed by: PATHOLOGY

## 2018-09-21 PROCEDURE — 36415 COLL VENOUS BLD VENIPUNCTURE: CPT

## 2018-09-21 PROCEDURE — 80053 COMPREHEN METABOLIC PANEL: CPT

## 2018-09-21 PROCEDURE — 84165 PROTEIN E-PHORESIS SERUM: CPT

## 2018-09-21 PROCEDURE — 82784 ASSAY IGA/IGD/IGG/IGM EACH: CPT | Mod: 59

## 2018-09-21 PROCEDURE — 86334 IMMUNOFIX E-PHORESIS SERUM: CPT | Mod: 26,,, | Performed by: PATHOLOGY

## 2018-09-21 PROCEDURE — 83520 IMMUNOASSAY QUANT NOS NONAB: CPT | Mod: 59

## 2018-09-21 PROCEDURE — 86334 IMMUNOFIX E-PHORESIS SERUM: CPT

## 2018-09-24 LAB
ALBUMIN SERPL ELPH-MCNC: 3.63 G/DL
ALPHA1 GLOB SERPL ELPH-MCNC: 0.25 G/DL
ALPHA2 GLOB SERPL ELPH-MCNC: 0.62 G/DL
B-GLOBULIN SERPL ELPH-MCNC: 0.61 G/DL
GAMMA GLOB SERPL ELPH-MCNC: 0.99 G/DL
INTERPRETATION SERPL IFE-IMP: NORMAL
KAPPA LC SER QL IA: 2.72 MG/DL
KAPPA LC/LAMBDA SER IA: 0.86
LAMBDA LC SER QL IA: 3.16 MG/DL
PATHOLOGIST INTERPRETATION IFE: NORMAL
PATHOLOGIST INTERPRETATION SPE: NORMAL
PROT SERPL-MCNC: 6.1 G/DL

## 2018-09-27 ENCOUNTER — TELEPHONE (OUTPATIENT)
Dept: HEMATOLOGY/ONCOLOGY | Facility: CLINIC | Age: 78
End: 2018-09-27

## 2018-10-26 DIAGNOSIS — C90.01 MULTIPLE MYELOMA IN REMISSION: ICD-10-CM

## 2018-10-29 RX ORDER — LENALIDOMIDE 2.5 MG/1
2.5 CAPSULE ORAL DAILY
Qty: 28 CAPSULE | Refills: 0 | Status: SHIPPED | OUTPATIENT
Start: 2018-10-29 | End: 2018-11-21

## 2018-11-05 ENCOUNTER — LAB VISIT (OUTPATIENT)
Dept: LAB | Facility: HOSPITAL | Age: 78
End: 2018-11-05
Attending: INTERNAL MEDICINE
Payer: MEDICARE

## 2018-11-05 ENCOUNTER — OFFICE VISIT (OUTPATIENT)
Dept: HEMATOLOGY/ONCOLOGY | Facility: CLINIC | Age: 78
End: 2018-11-05
Payer: MEDICARE

## 2018-11-05 VITALS
TEMPERATURE: 99 F | DIASTOLIC BLOOD PRESSURE: 70 MMHG | HEIGHT: 70 IN | OXYGEN SATURATION: 99 % | HEART RATE: 55 BPM | WEIGHT: 167.75 LBS | SYSTOLIC BLOOD PRESSURE: 157 MMHG | RESPIRATION RATE: 17 BRPM | BODY MASS INDEX: 24.02 KG/M2

## 2018-11-05 DIAGNOSIS — C90.01 MULTIPLE MYELOMA IN REMISSION: Primary | ICD-10-CM

## 2018-11-05 DIAGNOSIS — T50.905A DRUG-INDUCED CYTOPENIA: ICD-10-CM

## 2018-11-05 DIAGNOSIS — C90.01 MULTIPLE MYELOMA IN REMISSION: ICD-10-CM

## 2018-11-05 DIAGNOSIS — D75.9 DRUG-INDUCED CYTOPENIA: ICD-10-CM

## 2018-11-05 LAB
ALBUMIN SERPL BCP-MCNC: 3.6 G/DL
ALP SERPL-CCNC: 47 U/L
ALT SERPL W/O P-5'-P-CCNC: 21 U/L
ANION GAP SERPL CALC-SCNC: 4 MMOL/L
AST SERPL-CCNC: 18 U/L
BASOPHILS # BLD AUTO: 0.03 K/UL
BASOPHILS NFR BLD: 1.1 %
BILIRUB SERPL-MCNC: 0.7 MG/DL
BUN SERPL-MCNC: 23 MG/DL
CALCIUM SERPL-MCNC: 9.3 MG/DL
CHLORIDE SERPL-SCNC: 105 MMOL/L
CO2 SERPL-SCNC: 26 MMOL/L
CREAT SERPL-MCNC: 1.3 MG/DL
DIFFERENTIAL METHOD: ABNORMAL
EOSINOPHIL # BLD AUTO: 0.1 K/UL
EOSINOPHIL NFR BLD: 3.6 %
ERYTHROCYTE [DISTWIDTH] IN BLOOD BY AUTOMATED COUNT: 14.4 %
EST. GFR  (AFRICAN AMERICAN): >60 ML/MIN/1.73 M^2
EST. GFR  (NON AFRICAN AMERICAN): 52.3 ML/MIN/1.73 M^2
GLUCOSE SERPL-MCNC: 106 MG/DL
HCT VFR BLD AUTO: 43.8 %
HGB BLD-MCNC: 13.8 G/DL
IGA SERPL-MCNC: 150 MG/DL
IGG SERPL-MCNC: 1022 MG/DL
IGM SERPL-MCNC: 44 MG/DL
IMM GRANULOCYTES # BLD AUTO: 0.01 K/UL
IMM GRANULOCYTES NFR BLD AUTO: 0.4 %
LYMPHOCYTES # BLD AUTO: 0.8 K/UL
LYMPHOCYTES NFR BLD: 30.5 %
MCH RBC QN AUTO: 28.3 PG
MCHC RBC AUTO-ENTMCNC: 31.5 G/DL
MCV RBC AUTO: 90 FL
MONOCYTES # BLD AUTO: 0.5 K/UL
MONOCYTES NFR BLD: 18.9 %
NEUTROPHILS # BLD AUTO: 1.3 K/UL
NEUTROPHILS NFR BLD: 45.5 %
NRBC BLD-RTO: 0 /100 WBC
PLATELET # BLD AUTO: 99 K/UL
PMV BLD AUTO: 11 FL
POTASSIUM SERPL-SCNC: 4.5 MMOL/L
PROT SERPL-MCNC: 6.6 G/DL
RBC # BLD AUTO: 4.87 M/UL
SODIUM SERPL-SCNC: 135 MMOL/L
WBC # BLD AUTO: 2.75 K/UL

## 2018-11-05 PROCEDURE — 85025 COMPLETE CBC W/AUTO DIFF WBC: CPT

## 2018-11-05 PROCEDURE — 99215 OFFICE O/P EST HI 40 MIN: CPT | Mod: PBBFAC | Performed by: INTERNAL MEDICINE

## 2018-11-05 PROCEDURE — 36415 COLL VENOUS BLD VENIPUNCTURE: CPT

## 2018-11-05 PROCEDURE — 86334 IMMUNOFIX E-PHORESIS SERUM: CPT

## 2018-11-05 PROCEDURE — 99999 PR PBB SHADOW E&M-EST. PATIENT-LVL V: CPT | Mod: PBBFAC,,, | Performed by: INTERNAL MEDICINE

## 2018-11-05 PROCEDURE — 86334 IMMUNOFIX E-PHORESIS SERUM: CPT | Mod: 26,,, | Performed by: PATHOLOGY

## 2018-11-05 PROCEDURE — 84165 PROTEIN E-PHORESIS SERUM: CPT

## 2018-11-05 PROCEDURE — 80053 COMPREHEN METABOLIC PANEL: CPT

## 2018-11-05 PROCEDURE — 82784 ASSAY IGA/IGD/IGG/IGM EACH: CPT | Mod: 59

## 2018-11-05 PROCEDURE — 84165 PROTEIN E-PHORESIS SERUM: CPT | Mod: 26,,, | Performed by: PATHOLOGY

## 2018-11-05 PROCEDURE — 83520 IMMUNOASSAY QUANT NOS NONAB: CPT | Mod: 59

## 2018-11-05 PROCEDURE — 99214 OFFICE O/P EST MOD 30 MIN: CPT | Mod: S$PBB,,, | Performed by: INTERNAL MEDICINE

## 2018-11-05 RX ORDER — SULFAMETHOXAZOLE AND TRIMETHOPRIM 800; 160 MG/1; MG/1
TABLET ORAL
COMMUNITY
Start: 2018-09-29

## 2018-11-05 RX ORDER — MIRABEGRON 25 MG/1
TABLET, FILM COATED, EXTENDED RELEASE ORAL
COMMUNITY
Start: 2018-10-22

## 2018-11-05 NOTE — PROGRESS NOTES
SECTION OF HEMATOLOGY AND BONE MARROW TRANSPLANT  Return Patient Visit   11/05/2018  Referred by:  No ref. provider found  Referred for:  MM, autoSCT    CHIEF COMPLAINT:   No chief complaint on file.      HISTORY OF PRESENT ILLNESS:   78 y.o. male  with pmh as below, ; diagnosis of systemic myeloma, cytogenetic studies normal.  Initially presentedfor cataract surgery.  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  Patient subsequently completed 23 planned fractions of XRT + dex with rad onc in [place].     Both have resulted in improvement in proptosis and vision.  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  PET scan showed known orbit lesion as well as multiple other sites of  Bone disease.    He has completed 9 cycles of VRd and given CR on restaging, age, patient preference, decision was made not to proceed with transplant.  He was transitioned to revlimid maintenance may 2017 and has been tolerating well with mild cytopenias and remained in biochemical remission since that time.  .   Due to cytopenias we transitioned to 5mg and then 2.5mg  21 day regimen of a 28 day cycle.   Feels well. Pain resolved. Tolerating and compliant with revlimid . Comes to clinic with wife and son.  Remains active.  Doing chores, fishing.     PAST MEDICAL HISTORY:   Past Medical History:   Diagnosis Date    Allergy     Anxiety     Cataract     Glaucoma     Hypertension     denies htn states takes coreg for rhythm    Prostate cancer     Thyroid disease        PAST SURGICAL HISTORY:   Past Surgical History:   Procedure Laterality Date    BIOPSY-BONE MARROW Left 5/29/2017    Performed by Warren [facility] MD at [facility] OR 2ND FLR    BIOPSY-BONE MARROW Left 3/2/2017    Performed by Warren Park MD at [facility] OR 2ND FLR    Rhzbkj-xukcyp-yz N/A 6/21/2018    Performed by [facility]  Diagnostic Provider at [facility] OR [address] [place] [address]    BRAIN SURGERY  2010    optic nerve tumor    broken leg Left     CARPAL TUNNEL RELEASE Bilateral     EYE SURGERY Right     REPAIR-ECTROPION Right 6/30/2017    Performed by Maya Sandoval MD at [facility] OR [address] [place] [address]    SHOULDER SURGERY Right        PAST SOCIAL HISTORY:   reports that he has quit smoking. he has never used smokeless tobacco. He reports that he drinks about 4.2 oz of alcohol per week. He reports that he does not use drugs.    FAMILY HISTORY:  Family History   Problem Relation Age of Onset    Cancer Mother     Prostate cancer Father     Coronary artery disease Father     Dementia Sister     Stroke Brother     Dementia Brother     No Known Problems Brother     Dementia Brother     No Known Problems Brother     Pancreatitis Brother     Cancer Brother     No Known Problems Sister     No Known Problems Sister        CURRENT MEDICATIONS:   Current Outpatient Medications   Medication Sig    acyclovir (ZOVIRAX) 400 MG tablet Take 1 tablet (400 mg total) by mouth 2 (two) times daily.    alfuzosin (UROXATRAL) 10 mg Tb24     aspirin (ECOTRIN) 81 MG EC tablet Take 81 mg by mouth once daily.    carvedilol (COREG) 6.25 MG tablet     cephALEXin (KEFLEX) 500 MG capsule Take 500 mg by mouth every 12 (twelve) hours.    COMBIGAN 0.2-0.5 % Drop     cycloSPORINE (RESTASIS) 0.05 % ophthalmic emulsion Place 0.4 mLs (1 drop total) into both eyes 2 (two) times daily.    dexamethasone (DECADRON) 4 MG Tab     DUREZOL 0.05 % Drop ophthalmic solution     hydrocortisone 1 % cream     ILEVRO 0.3 % DrpS INSTILL ONE DROP INTO LEFT EYE ONCE DAILY    ketoconazole (NIZORAL) 2 % shampoo     latanoprost 0.005 % ophthalmic solution     lenalidomide (REVLIMID) 2.5 mg Cap Take 2.5 mg by mouth once daily auth # [id number] on 10/26/18.    levothyroxine (SYNTHROID) 125 MCG tablet     lifitegrast 5 % Dpet Place 1 drop into the right eye 2 (two) times daily.    multivitamin with  minerals tablet Take 1 tablet by mouth once daily.    multivitamin with minerals tablet Take 1 tablet by mouth.    MYRBETRIQ 25 mg Tb24 ER tablet     naproxen (NAPROSYN) 500 MG tablet     neomycin-polymyxin-hydrocortisone (CORTISPORIN) otic solution     omeprazole (PRILOSEC) 40 MG capsule     RESTASIS 0.05 % ophthalmic emulsion     rosuvastatin (CRESTOR) 20 MG tablet Take 20 mg by mouth.    sulfamethoxazole-trimethoprim 800-160mg (BACTRIM DS) 800-160 mg Tab     timolol maleate 0.5% (TIMOPTIC) 0.5 % Drop     tobramycin-dexamethasone 0.3-0.1% (TOBRADEX) 0.3-0.1 % DrpS Place 1 drop into the right eye 4 (four) times daily.     No current facility-administered medications for this visit.      ALLERGIES:   Review of patient's allergies indicates:   Allergen Reactions    Decongest tabs      All over the counter medications containing decongestive.              REVIEW OF SYSTEMS:   General ROS: negative  Psychological ROS: negative  Ophthalmic ROS: see HPI  ENT ROS: + right eye blurry vision   Allergy and Immunology ROS: negative  Hematological and Lymphatic ROS: negative  Endocrine ROS: negative  Respiratory ROS: negative  Cardiovascular ROS: negative  Gastrointestinal ROS: negative  Genito-Urinary ROS: negative  Musculoskeletal ROS: negative  Neurological ROS: negative  Dermatological ROS: negative    PHYSICAL EXAM:   Vitals:    11/05/18 1006   BP: (!) 157/70   Pulse: (!) 55   Resp: 17   Temp: 98.7 °F (37.1 °C)       General - well developed, well nourished, no apparent distress  Head & Face - no sinus tenderness  Eyes - right eye appears red and irritated  ENT - normal external auditory canals and tympanic membranes bilaterally oropharynx clear,  Normal dentition and gums  Neck - normal thyroid  Chest and Lung - normal respiratory effort, clear to auscultation bilaterally   Cardiovascular - RRR with no MGR, normal S1 and S2; no pedal edema  Abdomen -  soft, nontender, no palpable hepatomegaly or  splenomegaly  Lymph - no palpable lymphadenopathy  Extremities - unremarkable nails and digits  Heme - no bruising, petechiae, pallor  Skin - no rashes or lesions  Psych - appropriate mood and affect      ECOG Performance Status: (foot note - ECOG PS provided by Eastern Cooperative Oncology Group) 1 - Symptomatic but completely ambulatory    Karnofsky Performance Score:  90%- Able to Carry on Normal Activity: Minor Symptoms of Disease  DATA: .  Lab Results   Component Value Date    WBC 2.75 (L) 11/05/2018    HGB 13.8 (L) 11/05/2018    HCT 43.8 11/05/2018    MCV 90 11/05/2018    PLT 99 (L) 11/05/2018     Gran # (ANC)   Date Value Ref Range Status   11/05/2018 1.3 (L) 1.8 - 7.7 K/uL Final     Gran%   Date Value Ref Range Status   11/05/2018 45.5 38.0 - 73.0 % Final     Lymph #   Date Value Ref Range Status   11/05/2018 0.8 (L) 1.0 - 4.8 K/uL Final     Lymph%   Date Value Ref Range Status   11/05/2018 30.5 18.0 - 48.0 % Final     CMP  Sodium   Date Value Ref Range Status   11/05/2018 135 (L) 136 - 145 mmol/L Final     Potassium   Date Value Ref Range Status   11/05/2018 4.5 3.5 - 5.1 mmol/L Final     Chloride   Date Value Ref Range Status   11/05/2018 105 95 - 110 mmol/L Final     CO2   Date Value Ref Range Status   11/05/2018 26 23 - 29 mmol/L Final     Glucose   Date Value Ref Range Status   11/05/2018 106 70 - 110 mg/dL Final     BUN, Bld   Date Value Ref Range Status   11/05/2018 23 8 - 23 mg/dL Final     Creatinine   Date Value Ref Range Status   11/05/2018 1.3 0.5 - 1.4 mg/dL Final     Calcium   Date Value Ref Range Status   11/05/2018 9.3 8.7 - 10.5 mg/dL Final     Total Protein   Date Value Ref Range Status   11/05/2018 6.6 6.0 - 8.4 g/dL Final     Albumin   Date Value Ref Range Status   11/05/2018 3.6 3.5 - 5.2 g/dL Final     Total Bilirubin   Date Value Ref Range Status   11/05/2018 0.7 0.1 - 1.0 mg/dL Final     Comment:     For infants and newborns, interpretation of results should be based  on gestational  age, weight and in agreement with clinical  observations.  Premature Infant recommended reference ranges:  Up to 24 hours.............<8.0 mg/dL  Up to 48 hours............<12.0 mg/dL  3-5 days..................<15.0 mg/dL  6-29 days.................<15.0 mg/dL       Alkaline Phosphatase   Date Value Ref Range Status   11/05/2018 47 (L) 55 - 135 U/L Final     AST   Date Value Ref Range Status   11/05/2018 18 10 - 40 U/L Final     ALT   Date Value Ref Range Status   11/05/2018 21 10 - 44 U/L Final     Anion Gap   Date Value Ref Range Status   11/05/2018 4 (L) 8 - 16 mmol/L Final     eGFR if    Date Value Ref Range Status   11/05/2018 >60.0 >60 mL/min/1.73 m^2 Final     eGFR if non    Date Value Ref Range Status   11/05/2018 52.3 (A) >60 mL/min/1.73 m^2 Final     Comment:     Calculation used to obtain the estimated glomerular filtration  rate (eGFR) is the CKD-EPI equation.        IgG - Serum   Date Value Ref Range Status   11/05/2018 1022 650 - 1600 mg/dL Final     Comment:     IgG Cord Blood Reference Range: 650-1600 mg/dL.     IgA   Date Value Ref Range Status   11/05/2018 150 40 - 350 mg/dL Final     Comment:     IgA Cord Blood Reference Range: <5 mg/dL.     IgM   Date Value Ref Range Status   11/05/2018 44 (L) 50 - 300 mg/dL Final     Comment:     IgM Cord Blood Reference Range: <25 mg/dL.     Kappa Free Light Chains   Date Value Ref Range Status   09/21/2018 2.72 (H) 0.33 - 1.94 mg/dL Final   08/20/2018 2.98 (H) 0.33 - 1.94 mg/dL Final   07/27/2018 1.95 (H) 0.33 - 1.94 mg/dL Final     Lambda Free Light Chains   Date Value Ref Range Status   09/21/2018 3.16 (H) 0.57 - 2.63 mg/dL Final   08/20/2018 2.84 (H) 0.57 - 2.63 mg/dL Final   07/27/2018 2.11 0.57 - 2.63 mg/dL Final     Kappa/Lambda FLC Ratio   Date Value Ref Range Status   09/21/2018 0.86 0.26 - 1.65 Final   08/20/2018 1.05 0.26 - 1.65 Final   07/27/2018 0.92 0.26 - 1.65 Final         PLEASE SEE EPIC MEDIA TAB FOR ALL  IMAGING, PATHOLOGY REPORTS FROM OSH  ASSESSMENT AND PLAN:   Encounter Diagnoses   Name Primary?   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   -completed  9 cycles of VRd and achieved a IMWG CR on restaging pet, bone marrow biopsy, and biochemical studies may 2017 staging; transitioned to revlimid maintenance  may 2017; remains in remission as sept 2018 BCS; today's pending   -last imaging pet scan June 2018 with MYLENE; next SS due June 2019  -due to persistent cytopenias despite transitioned to 21 day cycle with decreased revlimid to 5mg day 1-21 of 28 day cycle (1/15/17);  Given persistent cytopenias transitioned to 2.5mg maintenance day 1-21 July 2018; today with mild tolerable cytopenias  -reviewed instructions to call with fevers, bleeding   -continue fu with local opthalmology for eye irritation; suspect residual effect from radiation and not related to myeloma    -supportive meds:   asa, bisphosphanate q 3 months x 1 year; dose 3 of 4 today; last dose  due nov 21 2019  -biopsy of pet RP mass cw hematoma   -decreased naproxyn use due to RP bleed    Follow Up:  -cbc, cmp, serum free light chains, quantitative immunoglobulins, serum electropheresis, serum immunofixation lab in [place] in 6 weeks  -Same labs, MD appt, chair for zometa in 12 weeks    Bernardo Park MD  Hematology/Oncology/Bone Marrow Transplant

## 2018-11-05 NOTE — Clinical Note
-cbc, cmp, serum free light chains, quantitative immunoglobulins, serum electropheresis, serum immunofixation at [facility] in 6 weeks-same labs, and MD fenton in 3 months

## 2018-11-06 LAB
ALBUMIN SERPL ELPH-MCNC: 3.64 G/DL
ALPHA1 GLOB SERPL ELPH-MCNC: 0.27 G/DL
ALPHA2 GLOB SERPL ELPH-MCNC: 0.66 G/DL
B-GLOBULIN SERPL ELPH-MCNC: 0.64 G/DL
GAMMA GLOB SERPL ELPH-MCNC: 0.99 G/DL
INTERPRETATION SERPL IFE-IMP: NORMAL
KAPPA LC SER QL IA: 3.44 MG/DL
KAPPA LC/LAMBDA SER IA: 1.17
LAMBDA LC SER QL IA: 2.94 MG/DL
PATHOLOGIST INTERPRETATION IFE: NORMAL
PATHOLOGIST INTERPRETATION SPE: NORMAL
PROT SERPL-MCNC: 6.2 G/DL

## 2018-11-21 DIAGNOSIS — C90.00 MULTIPLE MYELOMA, REMISSION STATUS UNSPECIFIED: Primary | ICD-10-CM

## 2018-11-21 RX ORDER — LENALIDOMIDE 2.5 MG/1
2.5 CAPSULE ORAL DAILY
Qty: 21 CAPSULE | Refills: 0 | Status: SHIPPED | OUTPATIENT
Start: 2018-11-21 | End: 2018-12-19 | Stop reason: SDUPTHER

## 2018-12-17 ENCOUNTER — LAB VISIT (OUTPATIENT)
Dept: LAB | Facility: HOSPITAL | Age: 78
End: 2018-12-17
Attending: INTERNAL MEDICINE
Payer: MEDICARE

## 2018-12-17 DIAGNOSIS — C90.01 MULTIPLE MYELOMA IN REMISSION: ICD-10-CM

## 2018-12-17 LAB
ALBUMIN SERPL BCP-MCNC: 3.7 G/DL
ALP SERPL-CCNC: 48 U/L
ALT SERPL W/O P-5'-P-CCNC: 22 U/L
ANION GAP SERPL CALC-SCNC: 6 MMOL/L
AST SERPL-CCNC: 18 U/L
BASOPHILS # BLD AUTO: 0.02 K/UL
BASOPHILS NFR BLD: 0.8 %
BILIRUB SERPL-MCNC: 0.7 MG/DL
BUN SERPL-MCNC: 18 MG/DL
CALCIUM SERPL-MCNC: 9.4 MG/DL
CHLORIDE SERPL-SCNC: 103 MMOL/L
CO2 SERPL-SCNC: 27 MMOL/L
CREAT SERPL-MCNC: 1.2 MG/DL
DIFFERENTIAL METHOD: ABNORMAL
EOSINOPHIL # BLD AUTO: 0.1 K/UL
EOSINOPHIL NFR BLD: 4.5 %
ERYTHROCYTE [DISTWIDTH] IN BLOOD BY AUTOMATED COUNT: 14.8 %
EST. GFR  (AFRICAN AMERICAN): >60 ML/MIN/1.73 M^2
EST. GFR  (NON AFRICAN AMERICAN): 58 ML/MIN/1.73 M^2
GLUCOSE SERPL-MCNC: 114 MG/DL
HCT VFR BLD AUTO: 45.1 %
HGB BLD-MCNC: 14.8 G/DL
IGA SERPL-MCNC: 156 MG/DL
IGG SERPL-MCNC: 994 MG/DL
IGM SERPL-MCNC: 41 MG/DL
LYMPHOCYTES # BLD AUTO: 1 K/UL
LYMPHOCYTES NFR BLD: 36 %
MCH RBC QN AUTO: 29 PG
MCHC RBC AUTO-ENTMCNC: 32.8 G/DL
MCV RBC AUTO: 88 FL
MONOCYTES # BLD AUTO: 0.5 K/UL
MONOCYTES NFR BLD: 20.1 %
NEUTROPHILS # BLD AUTO: 1 K/UL
NEUTROPHILS NFR BLD: 38.6 %
PLATELET # BLD AUTO: 105 K/UL
PMV BLD AUTO: 10.7 FL
POTASSIUM SERPL-SCNC: 4.3 MMOL/L
PROT SERPL-MCNC: 6.5 G/DL
RBC # BLD AUTO: 5.11 M/UL
SODIUM SERPL-SCNC: 136 MMOL/L
WBC # BLD AUTO: 2.64 K/UL

## 2018-12-17 PROCEDURE — 36415 COLL VENOUS BLD VENIPUNCTURE: CPT

## 2018-12-17 PROCEDURE — 86334 IMMUNOFIX E-PHORESIS SERUM: CPT

## 2018-12-17 PROCEDURE — 83520 IMMUNOASSAY QUANT NOS NONAB: CPT | Mod: 59

## 2018-12-17 PROCEDURE — 84165 PROTEIN E-PHORESIS SERUM: CPT | Mod: 26,,, | Performed by: PATHOLOGY

## 2018-12-17 PROCEDURE — 84165 PROTEIN E-PHORESIS SERUM: CPT

## 2018-12-17 PROCEDURE — 80053 COMPREHEN METABOLIC PANEL: CPT

## 2018-12-17 PROCEDURE — 85025 COMPLETE CBC W/AUTO DIFF WBC: CPT

## 2018-12-17 PROCEDURE — 86334 IMMUNOFIX E-PHORESIS SERUM: CPT | Mod: 26,,, | Performed by: PATHOLOGY

## 2018-12-17 PROCEDURE — 82784 ASSAY IGA/IGD/IGG/IGM EACH: CPT | Mod: 59

## 2018-12-18 LAB
ALBUMIN SERPL ELPH-MCNC: 3.7 G/DL
ALPHA1 GLOB SERPL ELPH-MCNC: 0.24 G/DL
ALPHA2 GLOB SERPL ELPH-MCNC: 0.62 G/DL
B-GLOBULIN SERPL ELPH-MCNC: 0.61 G/DL
GAMMA GLOB SERPL ELPH-MCNC: 0.93 G/DL
INTERPRETATION SERPL IFE-IMP: NORMAL
KAPPA LC SER QL IA: 2.55 MG/DL
KAPPA LC/LAMBDA SER IA: 1.11
LAMBDA LC SER QL IA: 2.3 MG/DL
PATHOLOGIST INTERPRETATION IFE: NORMAL
PATHOLOGIST INTERPRETATION SPE: NORMAL
PROT SERPL-MCNC: 6.1 G/DL

## 2018-12-19 ENCOUNTER — TELEPHONE (OUTPATIENT)
Dept: HEMATOLOGY/ONCOLOGY | Facility: CLINIC | Age: 78
End: 2018-12-19

## 2018-12-19 DIAGNOSIS — C90.00 MULTIPLE MYELOMA, REMISSION STATUS UNSPECIFIED: ICD-10-CM

## 2018-12-19 RX ORDER — LENALIDOMIDE 2.5 MG/1
2.5 CAPSULE ORAL DAILY
Qty: 21 CAPSULE | Refills: 0 | Status: SHIPPED | OUTPATIENT
Start: 2018-12-19 | End: 2019-01-17 | Stop reason: SDUPTHER

## 2019-01-17 DIAGNOSIS — C90.00 MULTIPLE MYELOMA, REMISSION STATUS UNSPECIFIED: ICD-10-CM

## 2019-01-18 DIAGNOSIS — C90.00 MULTIPLE MYELOMA, REMISSION STATUS UNSPECIFIED: ICD-10-CM

## 2019-01-18 RX ORDER — LENALIDOMIDE 2.5 MG/1
2.5 CAPSULE ORAL DAILY
Qty: 21 CAPSULE | Refills: 0 | Status: SHIPPED | OUTPATIENT
Start: 2019-01-18 | End: 2019-02-14 | Stop reason: SDUPTHER

## 2019-01-21 RX ORDER — LENALIDOMIDE 2.5 MG/1
CAPSULE ORAL
Qty: 21 CAPSULE | Refills: 0 | Status: SHIPPED | OUTPATIENT
Start: 2019-01-21 | End: 2019-03-06 | Stop reason: SDUPTHER

## 2019-02-11 ENCOUNTER — INFUSION (OUTPATIENT)
Dept: INFUSION THERAPY | Facility: HOSPITAL | Age: 79
End: 2019-02-11
Attending: INTERNAL MEDICINE
Payer: MEDICARE

## 2019-02-11 ENCOUNTER — LAB VISIT (OUTPATIENT)
Dept: LAB | Facility: HOSPITAL | Age: 79
End: 2019-02-11
Attending: INTERNAL MEDICINE
Payer: MEDICARE

## 2019-02-11 ENCOUNTER — OFFICE VISIT (OUTPATIENT)
Dept: HEMATOLOGY/ONCOLOGY | Facility: CLINIC | Age: 79
End: 2019-02-11
Payer: MEDICARE

## 2019-02-11 VITALS
OXYGEN SATURATION: 98 % | RESPIRATION RATE: 16 BRPM | HEIGHT: 70 IN | HEART RATE: 53 BPM | WEIGHT: 168 LBS | TEMPERATURE: 98 F | DIASTOLIC BLOOD PRESSURE: 74 MMHG | SYSTOLIC BLOOD PRESSURE: 156 MMHG | BODY MASS INDEX: 24.05 KG/M2

## 2019-02-11 VITALS — SYSTOLIC BLOOD PRESSURE: 140 MMHG | RESPIRATION RATE: 18 BRPM | DIASTOLIC BLOOD PRESSURE: 63 MMHG | TEMPERATURE: 98 F

## 2019-02-11 DIAGNOSIS — C90.01 MULTIPLE MYELOMA IN REMISSION: ICD-10-CM

## 2019-02-11 DIAGNOSIS — C90.01 MULTIPLE MYELOMA IN REMISSION: Primary | ICD-10-CM

## 2019-02-11 DIAGNOSIS — D61.818 PANCYTOPENIA: ICD-10-CM

## 2019-02-11 LAB
ALBUMIN SERPL BCP-MCNC: 3.6 G/DL
ALP SERPL-CCNC: 49 U/L
ALT SERPL W/O P-5'-P-CCNC: 19 U/L
ANION GAP SERPL CALC-SCNC: 5 MMOL/L
AST SERPL-CCNC: 18 U/L
BASOPHILS # BLD AUTO: 0.02 K/UL
BASOPHILS NFR BLD: 0.7 %
BILIRUB SERPL-MCNC: 0.6 MG/DL
BUN SERPL-MCNC: 23 MG/DL
CALCIUM SERPL-MCNC: 9.4 MG/DL
CHLORIDE SERPL-SCNC: 105 MMOL/L
CO2 SERPL-SCNC: 27 MMOL/L
CREAT SERPL-MCNC: 1 MG/DL
DIFFERENTIAL METHOD: ABNORMAL
EOSINOPHIL # BLD AUTO: 0.1 K/UL
EOSINOPHIL NFR BLD: 2.7 %
ERYTHROCYTE [DISTWIDTH] IN BLOOD BY AUTOMATED COUNT: 13.7 %
EST. GFR  (AFRICAN AMERICAN): >60 ML/MIN/1.73 M^2
EST. GFR  (NON AFRICAN AMERICAN): >60 ML/MIN/1.73 M^2
GLUCOSE SERPL-MCNC: 85 MG/DL
HCT VFR BLD AUTO: 44.2 %
HGB BLD-MCNC: 14 G/DL
IGA SERPL-MCNC: 135 MG/DL
IGG SERPL-MCNC: 855 MG/DL
IGM SERPL-MCNC: 38 MG/DL
IMM GRANULOCYTES # BLD AUTO: 0.02 K/UL
IMM GRANULOCYTES NFR BLD AUTO: 0.7 %
LYMPHOCYTES # BLD AUTO: 0.9 K/UL
LYMPHOCYTES NFR BLD: 30 %
MCH RBC QN AUTO: 29.3 PG
MCHC RBC AUTO-ENTMCNC: 31.7 G/DL
MCV RBC AUTO: 93 FL
MONOCYTES # BLD AUTO: 0.5 K/UL
MONOCYTES NFR BLD: 16 %
NEUTROPHILS # BLD AUTO: 1.5 K/UL
NEUTROPHILS NFR BLD: 49.9 %
NRBC BLD-RTO: 0 /100 WBC
PLATELET # BLD AUTO: 102 K/UL
PMV BLD AUTO: 10.9 FL
POTASSIUM SERPL-SCNC: 4.4 MMOL/L
PROT SERPL-MCNC: 6.1 G/DL
RBC # BLD AUTO: 4.78 M/UL
SODIUM SERPL-SCNC: 137 MMOL/L
WBC # BLD AUTO: 3 K/UL

## 2019-02-11 PROCEDURE — 63600175 PHARM REV CODE 636 W HCPCS: Performed by: INTERNAL MEDICINE

## 2019-02-11 PROCEDURE — 99214 PR OFFICE/OUTPT VISIT, EST, LEVL IV, 30-39 MIN: ICD-10-PCS | Mod: S$PBB,,, | Performed by: INTERNAL MEDICINE

## 2019-02-11 PROCEDURE — 25000003 PHARM REV CODE 250: Performed by: INTERNAL MEDICINE

## 2019-02-11 PROCEDURE — 86334 IMMUNOFIX E-PHORESIS SERUM: CPT | Mod: 26,,, | Performed by: PATHOLOGY

## 2019-02-11 PROCEDURE — 99999 PR PBB SHADOW E&M-EST. PATIENT-LVL V: CPT | Mod: PBBFAC,,, | Performed by: INTERNAL MEDICINE

## 2019-02-11 PROCEDURE — 84165 PATHOLOGIST INTERPRETATION SPE: ICD-10-PCS | Mod: 26,,, | Performed by: PATHOLOGY

## 2019-02-11 PROCEDURE — 36415 COLL VENOUS BLD VENIPUNCTURE: CPT

## 2019-02-11 PROCEDURE — 86334 IMMUNOFIX E-PHORESIS SERUM: CPT

## 2019-02-11 PROCEDURE — 82784 ASSAY IGA/IGD/IGG/IGM EACH: CPT | Mod: 59

## 2019-02-11 PROCEDURE — 99214 OFFICE O/P EST MOD 30 MIN: CPT | Mod: S$PBB,,, | Performed by: INTERNAL MEDICINE

## 2019-02-11 PROCEDURE — 84165 PROTEIN E-PHORESIS SERUM: CPT | Mod: 26,,, | Performed by: PATHOLOGY

## 2019-02-11 PROCEDURE — 96365 THER/PROPH/DIAG IV INF INIT: CPT

## 2019-02-11 PROCEDURE — 99999 PR PBB SHADOW E&M-EST. PATIENT-LVL V: ICD-10-PCS | Mod: PBBFAC,,, | Performed by: INTERNAL MEDICINE

## 2019-02-11 PROCEDURE — 86334 PATHOLOGIST INTERPRETATION IFE: ICD-10-PCS | Mod: 26,,, | Performed by: PATHOLOGY

## 2019-02-11 PROCEDURE — 85025 COMPLETE CBC W/AUTO DIFF WBC: CPT

## 2019-02-11 PROCEDURE — 83520 IMMUNOASSAY QUANT NOS NONAB: CPT | Mod: 59

## 2019-02-11 PROCEDURE — 84165 PROTEIN E-PHORESIS SERUM: CPT

## 2019-02-11 PROCEDURE — 99215 OFFICE O/P EST HI 40 MIN: CPT | Mod: PBBFAC,25 | Performed by: INTERNAL MEDICINE

## 2019-02-11 PROCEDURE — 80053 COMPREHEN METABOLIC PANEL: CPT

## 2019-02-11 RX ORDER — NYSTATIN 100000 U/G
CREAM TOPICAL
COMMUNITY
Start: 2018-12-19

## 2019-02-11 RX ORDER — HEPARIN 100 UNIT/ML
500 SYRINGE INTRAVENOUS
Status: CANCELLED | OUTPATIENT
Start: 2019-02-11

## 2019-02-11 RX ORDER — SODIUM CHLORIDE 0.9 % (FLUSH) 0.9 %
10 SYRINGE (ML) INJECTION
Status: CANCELLED | OUTPATIENT
Start: 2019-02-11

## 2019-02-11 RX ORDER — SODIUM CHLORIDE 0.9 % (FLUSH) 0.9 %
10 SYRINGE (ML) INJECTION
Status: DISCONTINUED | OUTPATIENT
Start: 2019-02-11 | End: 2019-02-11 | Stop reason: HOSPADM

## 2019-02-11 RX ORDER — DORZOLAMIDE HYDROCHLORIDE AND TIMOLOL MALEATE 20; 5 MG/ML; MG/ML
SOLUTION/ DROPS OPHTHALMIC
COMMUNITY
Start: 2019-01-23

## 2019-02-11 RX ORDER — LEVOTHYROXINE SODIUM 100 UG/1
TABLET ORAL
Refills: 0 | COMMUNITY
Start: 2019-02-04

## 2019-02-11 RX ORDER — TRIAMCINOLONE ACETONIDE 0.25 MG/G
CREAM TOPICAL
COMMUNITY
Start: 2018-11-15

## 2019-02-11 RX ORDER — TRIAMCINOLONE ACETONIDE 1 MG/G
CREAM TOPICAL
COMMUNITY
Start: 2018-12-19

## 2019-02-11 RX ORDER — HEPARIN 100 UNIT/ML
500 SYRINGE INTRAVENOUS
Status: DISCONTINUED | OUTPATIENT
Start: 2019-02-11 | End: 2019-02-11 | Stop reason: HOSPADM

## 2019-02-11 RX ADMIN — SODIUM CHLORIDE 4 MG: 9 INJECTION, SOLUTION INTRAVENOUS at 01:02

## 2019-02-11 NOTE — PLAN OF CARE
Problem: Adult Inpatient Plan of Care  Goal: Plan of Care Review  Outcome: Outcome(s) achieved Date Met: 02/11/19  Tolerated Zometa infusion without complications. PIV removed. Educated patient to take Vit. D and calcium supplements - handout provided with recommended doses. Also, patient verbalized understanding that he should not have dental work for 3 months following the administration of this drug. AVS provided. Discharged home.

## 2019-02-11 NOTE — PROGRESS NOTES
SECTION OF HEMATOLOGY AND BONE MARROW TRANSPLANT  Return Patient Visit   02/12/2019  Referred by:  No ref. provider found  Referred for:  MM, autoSCT    CHIEF COMPLAINT:   No chief complaint on file.      HISTORY OF PRESENT ILLNESS:   78 y.o. male  with pmh as below, ; diagnosis of systemic myeloma, cytogenetic studies normal.  Initially presentedfor cataract surgery.  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  Patient subsequently completed 23 planned fractions of XRT + dex with rad onc in [place].     Both have resulted in improvement in proptosis and vision.  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  PET scan showed known orbit lesion as well as multiple other sites of  Bone disease.    He has completed 9 cycles of VRd and given CR on restaging, age, patient preference, decision was made not to proceed with transplant.  He was transitioned to revlimid maintenance may 2017 and has been tolerating well with mild cytopenias and remained in biochemical remission since that time.  .   Due to cytopenias we transitioned to 5mg and then 2.5mg  21 day regimen of a 28 day cycle.   Feels well. Pain resolved. Tolerating and compliant with revlimid . Comes to clinic   son.  Remains active.  Doing chores, fishing.     PAST MEDICAL HISTORY:   Past Medical History:   Diagnosis Date    Allergy     Anxiety     Cataract     Glaucoma     Hypertension     denies htn states takes coreg for rhythm    Prostate cancer     Thyroid disease        PAST SURGICAL HISTORY:   Past Surgical History:   Procedure Laterality Date    BIOPSY-BONE MARROW Left 5/29/2017    Performed by Warren [facility] at [facility] 2ND FLR    BIOPSY-BONE MARROW Left 3/2/2017    Performed by Warren [facility] at [facility] OR 2ND FLR    Hilrwx-cmurdw-ps N/A 6/21/2018    Performed by [facility]  at [facility] OR 2ND FLR    BRAIN SURGERY  2010    optic nerve tumor    broken leg Left     CARPAL TUNNEL RELEASE Bilateral     EYE SURGERY Right     REPAIR-ECTROPION Right 6/30/2017    Performed by Maya Sandoval MD at [facility] OR 1ST FLR    SHOULDER SURGERY Right        PAST SOCIAL HISTORY:   reports that he has quit smoking. he has never used smokeless tobacco. He reports that he drinks about 4.2 oz of alcohol per week. He reports that he does not use drugs.    FAMILY HISTORY:  Family History   Problem Relation Age of Onset    Cancer Mother     Prostate cancer Father     Coronary artery disease Father     Dementia Sister     Stroke Brother     Dementia Brother     No Known Problems Brother     Dementia Brother     No Known Problems Brother     Pancreatitis Brother     Cancer Brother     No Known Problems Sister     No Known Problems Sister        CURRENT MEDICATIONS:   Current Outpatient Medications   Medication Sig    acyclovir (ZOVIRAX) 400 MG tablet Take 1 tablet (400 mg total) by mouth 2 (two) times daily.    alfuzosin (UROXATRAL) 10 mg Tb24     aspirin (ECOTRIN) 81 MG EC tablet Take 81 mg by mouth once daily.    carvedilol (COREG) 6.25 MG tablet     cephALEXin (KEFLEX) 500 MG capsule Take 500 mg by mouth every 12 (twelve) hours.    COMBIGAN 0.2-0.5 % Drop     cycloSPORINE (RESTASIS) 0.05 % ophthalmic emulsion Place 0.4 mLs (1 drop total) into both eyes 2 (two) times daily.    dexamethasone (DECADRON) 4 MG Tab     dorzolamide-timolol 2-0.5% (COSOPT) 22.3-6.8 mg/mL ophthalmic solution     DUREZOL 0.05 % Drop ophthalmic solution     hydrocortisone 1 % cream     ILEVRO 0.3 % DrpS INSTILL ONE DROP INTO LEFT EYE ONCE DAILY    ketoconazole (NIZORAL) 2 % shampoo     latanoprost 0.005 % ophthalmic solution     lenalidomide (REVLIMID) 2.5 mg Cap Take 2.5 mg by mouth Daily auth # [id number] 01/17/19 male.    levothyroxine (SYNTHROID) 100 MCG tablet     levothyroxine (SYNTHROID) 125 MCG tablet      lifitegrast 5 % Dpet Place 1 drop into the right eye 2 (two) times daily.    multivitamin with minerals tablet Take 1 tablet by mouth once daily.    MYRBETRIQ 25 mg Tb24 ER tablet     naproxen (NAPROSYN) 500 MG tablet     neomycin-polymyxin-hydrocortisone (CORTISPORIN) otic solution     nystatin (MYCOSTATIN) cream     omeprazole (PRILOSEC) 40 MG capsule     RESTASIS 0.05 % ophthalmic emulsion     REVLIMID (REVLIMID) 2.5 mg Cap Take 1 capsule by mouth once daily.    rosuvastatin (CRESTOR) 20 MG tablet Take 20 mg by mouth.    sulfamethoxazole-trimethoprim 800-160mg (BACTRIM DS) 800-160 mg Tab     timolol maleate 0.5% (TIMOPTIC) 0.5 % Drop     tobramycin-dexamethasone 0.3-0.1% (TOBRADEX) 0.3-0.1 % DrpS Place 1 drop into the right eye 4 (four) times daily.    triamcinolone acetonide 0.025% (KENALOG) 0.025 % cream     triamcinolone acetonide 0.1% (KENALOG) 0.1 % cream      No current facility-administered medications for this visit.      ALLERGIES:   Review of patient's allergies indicates:   Allergen Reactions    Decongest tabs      All over the counter medications containing decongestive.              REVIEW OF SYSTEMS:   General ROS: negative  Psychological ROS: negative  Ophthalmic ROS: see HPI  ENT ROS: + right eye blurry vision   Allergy and Immunology ROS: negative  Hematological and Lymphatic ROS: negative  Endocrine ROS: negative  Respiratory ROS: negative  Cardiovascular ROS: negative  Gastrointestinal ROS: negative  Genito-Urinary ROS: negative  Musculoskeletal ROS: negative  Neurological ROS: negative  Dermatological ROS: negative    PHYSICAL EXAM:   Vitals:    02/11/19 1054   BP: (!) 156/74   Pulse: (!) 53   Resp: 16   Temp: 97.9 °F (36.6 °C)       General - well developed, well nourished, no apparent distress  Head & Face - no sinus tenderness  Eyes - right eye appears red and irritated  ENT - normal external auditory canals and tympanic membranes bilaterally oropharynx clear,  Normal  dentition and gums  Neck - normal thyroid  Chest and Lung - normal respiratory effort, clear to auscultation bilaterally   Cardiovascular - RRR with no MGR, normal S1 and S2; no pedal edema  Abdomen -  soft, nontender, no palpable hepatomegaly or splenomegaly  Lymph - no palpable lymphadenopathy  Extremities - unremarkable nails and digits  Heme - no bruising, petechiae, pallor  Skin - no rashes or lesions  Psych - appropriate mood and affect      ECOG Performance Status: (foot note - ECOG PS provided by [employer]cology [employer]) 1 - Symptomatic but completely ambulatory    Karnofsky Performance Score:  90%- Able to Carry on Normal Activity: Minor Symptoms of Disease  DATA: .  Lab Results   Component Value Date    WBC 3.00 (L) 02/11/2019    HGB 14.0 02/11/2019    HCT 44.2 02/11/2019    MCV 93 02/11/2019     (L) 02/11/2019     Gran # (ANC)   Date Value Ref Range Status   02/11/2019 1.5 (L) 1.8 - 7.7 K/uL Final     Gran%   Date Value Ref Range Status   02/11/2019 49.9 38.0 - 73.0 % Final     Lymph #   Date Value Ref Range Status   02/11/2019 0.9 (L) 1.0 - 4.8 K/uL Final     Lymph%   Date Value Ref Range Status   02/11/2019 30.0 18.0 - 48.0 % Final     CMP  Sodium   Date Value Ref Range Status   02/11/2019 137 136 - 145 mmol/L Final     Potassium   Date Value Ref Range Status   02/11/2019 4.4 3.5 - 5.1 mmol/L Final     Chloride   Date Value Ref Range Status   02/11/2019 105 95 - 110 mmol/L Final     CO2   Date Value Ref Range Status   02/11/2019 27 23 - 29 mmol/L Final     Glucose   Date Value Ref Range Status   02/11/2019 85 70 - 110 mg/dL Final     BUN, Bld   Date Value Ref Range Status   02/11/2019 23 8 - 23 mg/dL Final     Creatinine   Date Value Ref Range Status   02/11/2019 1.0 0.5 - 1.4 mg/dL Final     Calcium   Date Value Ref Range Status   02/11/2019 9.4 8.7 - 10.5 mg/dL Final     Total Protein   Date Value Ref Range Status   02/11/2019 6.1 6.0 - 8.4 g/dL Final     Albumin   Date Value Ref  Range Status   02/11/2019 3.6 3.5 - 5.2 g/dL Final     Total Bilirubin   Date Value Ref Range Status   02/11/2019 0.6 0.1 - 1.0 mg/dL Final     Comment:     For infants and newborns, interpretation of results should be based  on gestational age, weight and in agreement with clinical  observations.  Premature Infant recommended reference ranges:  Up to 24 hours.............<8.0 mg/dL  Up to 48 hours............<12.0 mg/dL  3-5 days..................<15.0 mg/dL  6-29 days.................<15.0 mg/dL       Alkaline Phosphatase   Date Value Ref Range Status   02/11/2019 49 (L) 55 - 135 U/L Final     AST   Date Value Ref Range Status   02/11/2019 18 10 - 40 U/L Final     ALT   Date Value Ref Range Status   02/11/2019 19 10 - 44 U/L Final     Anion Gap   Date Value Ref Range Status   02/11/2019 5 (L) 8 - 16 mmol/L Final     eGFR if    Date Value Ref Range Status   02/11/2019 >60.0 >60 mL/min/1.73 m^2 Final     eGFR if non    Date Value Ref Range Status   02/11/2019 >60.0 >60 mL/min/1.73 m^2 Final     Comment:     Calculation used to obtain the estimated glomerular filtration  rate (eGFR) is the CKD-EPI equation.        IgG - Serum   Date Value Ref Range Status   02/11/2019 855 650 - 1600 mg/dL Final     Comment:     IgG Cord Blood Reference Range: 650-1600 mg/dL.     IgA   Date Value Ref Range Status   02/11/2019 135 40 - 350 mg/dL Final     Comment:     IgA Cord Blood Reference Range: <5 mg/dL.     IgM   Date Value Ref Range Status   02/11/2019 38 (L) 50 - 300 mg/dL Final     Comment:     IgM Cord Blood Reference Range: <25 mg/dL.     Kappa Free Light Chains   Date Value Ref Range Status   12/17/2018 2.55 (H) 0.33 - 1.94 mg/dL Final   11/05/2018 3.44 (H) 0.33 - 1.94 mg/dL Final   09/21/2018 2.72 (H) 0.33 - 1.94 mg/dL Final     Lambda Free Light Chains   Date Value Ref Range Status   12/17/2018 2.30 0.57 - 2.63 mg/dL Final   11/05/2018 2.94 (H) 0.57 - 2.63 mg/dL Final   09/21/2018 3.16 (H)  0.57 - 2.63 mg/dL Final     Kappa/Lambda FLC Ratio   Date Value Ref Range Status   12/17/2018 1.11 0.26 - 1.65 Final   11/05/2018 1.17 0.26 - 1.65 Final   09/21/2018 0.86 0.26 - 1.65 Final         PLEASE SEE EPIC MEDIA TAB FOR ALL IMAGING, PATHOLOGY REPORTS FROM OSH  ASSESSMENT AND PLAN:   Encounter Diagnoses   Name Primary?   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   -completed  9 cycles of VRd and achieved a IMWG CR on restaging pet, bone marrow biopsy, and biochemical studies may 2017 staging; transitioned to revlimid maintenance  may 2017; remains in remission as dec  2018 BCS; today's pending   -last imaging pet scan June 2018 with MYLENE; next SS due June 2019  -continue 2.5mg maintenance day 1-21 (since July 2018(; required dose reductions/schedule alterations due to persistent cytopenias ; today with stable mild asymptomatic  cytopenias so will continue current dose   -reviewed instructions to call with fevers, bleeding   -continue fu with local opthalmology for eye irritation; suspect residual effect from radiation and not related to myeloma    -supportive meds:   asa, bisphosphanate q 3 months x 1 year; dose 4  of 4 today;   -biopsy of pet RP mass cw hematoma   -decreased naproxyn use due to RP bleed  -educated on symptoms for which to seek attn in our clinic earlier       Follow Up:  -cbc, cmp, serum free light chains, quantitative immunoglobulins, serum electropheresis, serum immunofixation lab in [place] in 6 weeks  -Same labs, MD appt,   12 weeks    Bernardo Park MD  Hematology/Oncology/Bone Marrow Transplant

## 2019-02-11 NOTE — PLAN OF CARE
Problem: Adult Inpatient Plan of Care  Goal: Plan of Care Review  Outcome: Ongoing (interventions implemented as appropriate)  Patient here for Zometa infusion. VS stable. PIV accessed obtained. Labs reviewed. Will proceed with treatment.

## 2019-02-11 NOTE — Clinical Note
-cbc, cmp, serum free light chains, quantitative immunoglobulins, serum electropheresis, serum immunofixation lab in [place] in 6 weeks-Same labs, MD [place],   12 weeks

## 2019-02-12 LAB
ALBUMIN SERPL ELPH-MCNC: 3.56 G/DL
ALPHA1 GLOB SERPL ELPH-MCNC: 0.24 G/DL
ALPHA2 GLOB SERPL ELPH-MCNC: 0.58 G/DL
B-GLOBULIN SERPL ELPH-MCNC: 0.59 G/DL
GAMMA GLOB SERPL ELPH-MCNC: 0.84 G/DL
INTERPRETATION SERPL IFE-IMP: NORMAL
KAPPA LC SER QL IA: 2.58 MG/DL
KAPPA LC/LAMBDA SER IA: 0.94
LAMBDA LC SER QL IA: 2.74 MG/DL
PATHOLOGIST INTERPRETATION IFE: NORMAL
PATHOLOGIST INTERPRETATION SPE: NORMAL
PROT SERPL-MCNC: 5.8 G/DL

## 2019-02-13 ENCOUNTER — TELEPHONE (OUTPATIENT)
Dept: HEMATOLOGY/ONCOLOGY | Facility: CLINIC | Age: 79
End: 2019-02-13

## 2019-02-14 DIAGNOSIS — C90.00 MULTIPLE MYELOMA, REMISSION STATUS UNSPECIFIED: ICD-10-CM

## 2019-02-14 RX ORDER — LENALIDOMIDE 2.5 MG/1
2.5 CAPSULE ORAL DAILY
Qty: 21 CAPSULE | Refills: 0 | OUTPATIENT
Start: 2019-02-14 | End: 2019-03-04 | Stop reason: SDUPTHER

## 2019-02-22 DIAGNOSIS — C90.00 MULTIPLE MYELOMA, REMISSION STATUS UNSPECIFIED: ICD-10-CM

## 2019-02-25 DIAGNOSIS — C90.00 MULTIPLE MYELOMA, REMISSION STATUS UNSPECIFIED: ICD-10-CM

## 2019-02-25 RX ORDER — LENALIDOMIDE 2.5 MG/1
CAPSULE ORAL
Qty: 21 CAPSULE | Refills: 0 | OUTPATIENT
Start: 2019-02-25

## 2019-02-25 RX ORDER — LENALIDOMIDE 2.5 MG/1
CAPSULE ORAL
Qty: 21 CAPSULE | Refills: 10 | OUTPATIENT
Start: 2019-02-25

## 2019-03-04 DIAGNOSIS — C90.00 MULTIPLE MYELOMA, REMISSION STATUS UNSPECIFIED: ICD-10-CM

## 2019-03-04 RX ORDER — LENALIDOMIDE 2.5 MG/1
2.5 CAPSULE ORAL DAILY
Qty: 21 CAPSULE | Refills: 0 | OUTPATIENT
Start: 2019-03-04 | End: 2019-03-21 | Stop reason: SDUPTHER

## 2019-03-04 RX ORDER — LENALIDOMIDE 2.5 MG/1
2.5 CAPSULE ORAL DAILY
Qty: 21 CAPSULE | Refills: 0 | OUTPATIENT
Start: 2019-03-04 | End: 2019-03-04 | Stop reason: SDUPTHER

## 2019-03-06 DIAGNOSIS — C90.00 MULTIPLE MYELOMA, REMISSION STATUS UNSPECIFIED: ICD-10-CM

## 2019-03-06 RX ORDER — LENALIDOMIDE 2.5 MG/1
1 CAPSULE ORAL DAILY
Qty: 21 CAPSULE | Refills: 0 | Status: SHIPPED | OUTPATIENT
Start: 2019-03-06 | End: 2019-03-21 | Stop reason: SDUPTHER

## 2019-03-18 ENCOUNTER — TELEPHONE (OUTPATIENT)
Dept: HEMATOLOGY/ONCOLOGY | Facility: CLINIC | Age: 79
End: 2019-03-18

## 2019-03-18 NOTE — TELEPHONE ENCOUNTER
----- Message from Shahab Antoine sent at 3/18/2019  3:38 PM CDT -----  Contact: Barbara Calabrese (Daughter)  Pt needs to schedule f/u appt with Dr Park.      Contact:: [phone] or [phone]

## 2019-03-21 DIAGNOSIS — C90.00 MULTIPLE MYELOMA, REMISSION STATUS UNSPECIFIED: ICD-10-CM

## 2019-03-21 RX ORDER — LENALIDOMIDE 2.5 MG/1
1 CAPSULE ORAL DAILY
Qty: 21 CAPSULE | Refills: 0 | Status: SHIPPED | OUTPATIENT
Start: 2019-03-21 | End: 2019-04-18 | Stop reason: SDUPTHER

## 2019-03-21 RX ORDER — LENALIDOMIDE 2.5 MG/1
2.5 CAPSULE ORAL DAILY
Qty: 21 CAPSULE | Refills: 0 | Status: SHIPPED | OUTPATIENT
Start: 2019-03-21 | End: 2019-04-18 | Stop reason: SDUPTHER

## 2019-03-22 NOTE — TELEPHONE ENCOUNTER
Prescription sent on 3/21/19      ----- Message from Britt Meza sent at 3/22/2019 10:53 AM CDT -----  Contact: [facility]   Will need a auth number, current one has  already for the Revlimid. Please contact and provide a new one via eScript.     [facility] - [place], [place] - [address] [place], [place] [address] [phone] (Phone)  [phone] (Fax)

## 2019-03-25 ENCOUNTER — TELEPHONE (OUTPATIENT)
Dept: HEMATOLOGY/ONCOLOGY | Facility: CLINIC | Age: 79
End: 2019-03-25

## 2019-03-25 ENCOUNTER — LAB VISIT (OUTPATIENT)
Dept: LAB | Facility: HOSPITAL | Age: 79
End: 2019-03-25
Attending: INTERNAL MEDICINE
Payer: MEDICARE

## 2019-03-25 DIAGNOSIS — C90.01 MULTIPLE MYELOMA IN REMISSION: ICD-10-CM

## 2019-03-25 LAB
ALBUMIN SERPL BCP-MCNC: 3.8 G/DL (ref 3.5–5.2)
ALP SERPL-CCNC: 45 U/L (ref 55–135)
ALT SERPL W/O P-5'-P-CCNC: 25 U/L (ref 10–44)
ANION GAP SERPL CALC-SCNC: 6 MMOL/L (ref 8–16)
AST SERPL-CCNC: 23 U/L (ref 10–40)
BASOPHILS # BLD AUTO: 0.01 K/UL (ref 0–0.2)
BASOPHILS NFR BLD: 0.3 % (ref 0–1.9)
BILIRUB SERPL-MCNC: 0.8 MG/DL (ref 0.1–1)
BUN SERPL-MCNC: 27 MG/DL (ref 8–23)
CALCIUM SERPL-MCNC: 9.5 MG/DL (ref 8.7–10.5)
CHLORIDE SERPL-SCNC: 105 MMOL/L (ref 95–110)
CO2 SERPL-SCNC: 26 MMOL/L (ref 23–29)
CREAT SERPL-MCNC: 1.3 MG/DL (ref 0.5–1.4)
DIFFERENTIAL METHOD: ABNORMAL
EOSINOPHIL # BLD AUTO: 0.1 K/UL (ref 0–0.5)
EOSINOPHIL NFR BLD: 3.4 % (ref 0–8)
ERYTHROCYTE [DISTWIDTH] IN BLOOD BY AUTOMATED COUNT: 14.5 % (ref 11.5–14.5)
EST. GFR  (AFRICAN AMERICAN): 60 ML/MIN/1.73 M^2
EST. GFR  (NON AFRICAN AMERICAN): 52 ML/MIN/1.73 M^2
GLUCOSE SERPL-MCNC: 93 MG/DL (ref 70–110)
HCT VFR BLD AUTO: 43.1 % (ref 40–54)
HGB BLD-MCNC: 14.2 G/DL (ref 14–18)
IGA SERPL-MCNC: 149 MG/DL (ref 40–350)
IGG SERPL-MCNC: 940 MG/DL (ref 650–1600)
IGM SERPL-MCNC: 44 MG/DL (ref 50–300)
LYMPHOCYTES # BLD AUTO: 0.8 K/UL (ref 1–4.8)
LYMPHOCYTES NFR BLD: 21.6 % (ref 18–48)
MCH RBC QN AUTO: 29.5 PG (ref 27–31)
MCHC RBC AUTO-ENTMCNC: 32.9 G/DL (ref 32–36)
MCV RBC AUTO: 90 FL (ref 82–98)
MONOCYTES # BLD AUTO: 0.6 K/UL (ref 0.3–1)
MONOCYTES NFR BLD: 17.4 % (ref 4–15)
NEUTROPHILS # BLD AUTO: 2 K/UL (ref 1.8–7.7)
NEUTROPHILS NFR BLD: 57.3 % (ref 38–73)
PLATELET # BLD AUTO: 89 K/UL (ref 150–350)
PMV BLD AUTO: 10.5 FL (ref 9.2–12.9)
POTASSIUM SERPL-SCNC: 4.7 MMOL/L (ref 3.5–5.1)
PROT SERPL-MCNC: 6.3 G/DL (ref 6–8.4)
RBC # BLD AUTO: 4.81 M/UL (ref 4.6–6.2)
SODIUM SERPL-SCNC: 137 MMOL/L (ref 136–145)
WBC # BLD AUTO: 3.56 K/UL (ref 3.9–12.7)

## 2019-03-25 PROCEDURE — 83520 IMMUNOASSAY QUANT NOS NONAB: CPT

## 2019-03-25 PROCEDURE — 86334 IMMUNOFIX E-PHORESIS SERUM: CPT

## 2019-03-25 PROCEDURE — 84165 PROTEIN E-PHORESIS SERUM: CPT

## 2019-03-25 PROCEDURE — 84165 PATHOLOGIST INTERPRETATION SPE: ICD-10-PCS | Mod: 26,,, | Performed by: PATHOLOGY

## 2019-03-25 PROCEDURE — 85025 COMPLETE CBC W/AUTO DIFF WBC: CPT

## 2019-03-25 PROCEDURE — 82784 ASSAY IGA/IGD/IGG/IGM EACH: CPT | Mod: 59

## 2019-03-25 PROCEDURE — 86334 IMMUNOFIX E-PHORESIS SERUM: CPT | Mod: 26,,, | Performed by: PATHOLOGY

## 2019-03-25 PROCEDURE — 86334 PATHOLOGIST INTERPRETATION IFE: ICD-10-PCS | Mod: 26,,, | Performed by: PATHOLOGY

## 2019-03-25 PROCEDURE — 36415 COLL VENOUS BLD VENIPUNCTURE: CPT

## 2019-03-25 PROCEDURE — 84165 PROTEIN E-PHORESIS SERUM: CPT | Mod: 26,,, | Performed by: PATHOLOGY

## 2019-03-25 PROCEDURE — 80053 COMPREHEN METABOLIC PANEL: CPT

## 2019-03-25 NOTE — TELEPHONE ENCOUNTER
Spoke to pharmacy. [employer] auth number provided    ----- Message from Britt Meza sent at 3/25/2019  2:55 PM CDT -----  Contact: [facility]   New cell gene authorization number to dispense the revlimid   Please address.     Please call [employer] to get a new one.   [phone] please call if needed.

## 2019-03-26 LAB
ALBUMIN SERPL ELPH-MCNC: 3.71 G/DL (ref 3.35–5.55)
ALPHA1 GLOB SERPL ELPH-MCNC: 0.22 G/DL (ref 0.17–0.41)
ALPHA2 GLOB SERPL ELPH-MCNC: 0.55 G/DL (ref 0.43–0.99)
B-GLOBULIN SERPL ELPH-MCNC: 0.57 G/DL (ref 0.5–1.1)
GAMMA GLOB SERPL ELPH-MCNC: 0.85 G/DL (ref 0.67–1.58)
INTERPRETATION SERPL IFE-IMP: NORMAL
KAPPA LC SER QL IA: 3.07 MG/DL (ref 0.33–1.94)
KAPPA LC/LAMBDA SER IA: 0.99 (ref 0.26–1.65)
LAMBDA LC SER QL IA: 3.1 MG/DL (ref 0.57–2.63)
PATHOLOGIST INTERPRETATION IFE: NORMAL
PATHOLOGIST INTERPRETATION SPE: NORMAL
PROT SERPL-MCNC: 5.9 G/DL (ref 6–8.4)

## 2019-03-28 ENCOUNTER — TELEPHONE (OUTPATIENT)
Dept: HEMATOLOGY/ONCOLOGY | Facility: CLINIC | Age: 79
End: 2019-03-28

## 2019-04-18 DIAGNOSIS — C90.00 MULTIPLE MYELOMA, REMISSION STATUS UNSPECIFIED: ICD-10-CM

## 2019-04-18 RX ORDER — LENALIDOMIDE 2.5 MG/1
2.5 CAPSULE ORAL DAILY
Qty: 21 CAPSULE | Refills: 0 | Status: SHIPPED | OUTPATIENT
Start: 2019-04-18 | End: 2019-10-01 | Stop reason: SDUPTHER

## 2019-04-18 RX ORDER — LENALIDOMIDE 2.5 MG/1
2.5 CAPSULE ORAL DAILY
Qty: 21 CAPSULE | Refills: 0 | Status: CANCELLED | OUTPATIENT
Start: 2019-04-18

## 2019-04-18 RX ORDER — LENALIDOMIDE 2.5 MG/1
CAPSULE ORAL
Qty: 21 CAPSULE | Refills: 11 | Status: SHIPPED | OUTPATIENT
Start: 2019-04-18 | End: 2019-05-13 | Stop reason: SDUPTHER

## 2019-05-13 DIAGNOSIS — C90.00 MULTIPLE MYELOMA, REMISSION STATUS UNSPECIFIED: ICD-10-CM

## 2019-05-13 RX ORDER — LENALIDOMIDE 2.5 MG/1
CAPSULE ORAL
Qty: 21 CAPSULE | Refills: 9 | Status: SHIPPED | OUTPATIENT
Start: 2019-05-13 | End: 2019-06-12 | Stop reason: SDUPTHER

## 2019-05-14 ENCOUNTER — TELEPHONE (OUTPATIENT)
Dept: HEMATOLOGY/ONCOLOGY | Facility: CLINIC | Age: 79
End: 2019-05-14

## 2019-05-14 NOTE — TELEPHONE ENCOUNTER
----- Message from Shahab Antoine sent at 5/14/2019  9:45 AM CDT -----  Contact: [facility]  Pharmacy needs the [employer] auth for Rx REVLIMID (REVLIMID) 2.5 mg Cap       Contact:: [phone]

## 2019-05-30 NOTE — PROGRESS NOTES
SECTION OF HEMATOLOGY AND BONE MARROW TRANSPLANT  Return Patient Visit   05/31/2019  Referred by:  No ref. provider found  Referred for:  MM, autoSCT    CHIEF COMPLAINT:   Chief Complaint   Patient presents with    Multiple myeloma in remission       HISTORY OF PRESENT ILLNESS:   79 y.o. male  with pmh as below, ; diagnosis of systemic myeloma, cytogenetic studies normal.  Initially presentedfor cataract surgery.  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  Patient subsequently completed 23 planned fractions of XRT + dex with rad onc in [place].     Both have resulted in improvement in proptosis and vision.  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  PET scan showed known orbit lesion as well as multiple other sites of  Bone disease.    He has completed 9 cycles of VRd and given CR on restaging, age, patient preference, decision was made not to proceed with transplant.  He was transitioned to revlimid maintenance may 2017 and has been tolerating well with mild cytopenias and remained in biochemical remission since that time.  .   Due to cytopenias we transitioned to 5mg and then 2.5mg  21 day regimen of a 28 day cycle.       Today: Pt presents for routine f/u. He feels well. Some pain in neck from spurs and will have surgery with neurosurg in [place] on 6/25/19. Tolerating and compliant with revlimid 2.5 mg 21 day regimen. Compliant with ASA. Will hold as indicated by neurosurg. Cytopenias remain but pt asymptomatic. Remains active still. Excited to celebrate his 60th wedding anniversary tomorrow with a big party. Eye irritation persists and pt saw optho yesterday with new eye drops to try. Denies fevers, chills, n/v/c/d, sob, chest pain, bleeding, and bruising.       PAST MEDICAL HISTORY:   Past Medical History:   Diagnosis Date    Allergy     Anxiety      Cataract     Glaucoma     Hypertension     denies htn states takes coreg for rhythm    Prostate cancer     Thyroid disease        PAST SURGICAL HISTORY:   Past Surgical History:   Procedure Laterality Date    BIOPSY-BONE MARROW Left 5/29/2017    Performed by Warren Park MD at [facility] OR [address] [place] [address]    BIOPSY-BONE MARROW Left 3/2/2017    Performed by Warren Park MD at [facility] OR [address] [place] [address]    Krlqqg-[id number]-bh N/A 6/21/2018    Performed by [facility] at [facility] OR [address] [place] [address]    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 He has never used smokeless tobacco. He reports that he drinks about 4.2 oz of alcohol per week. He reports that he does not use drugs.    FAMILY HISTORY:  Family History   Problem Relation Age of Onset    Cancer Mother     Prostate cancer Father     Coronary artery disease Father     Dementia Sister     Stroke Brother     Dementia Brother     No Known Problems Brother     Dementia Brother     No Known Problems Brother     Pancreatitis Brother     Cancer Brother     No Known Problems Sister     No Known Problems Sister        CURRENT MEDICATIONS:   Current Outpatient Medications   Medication Sig    alfuzosin (UROXATRAL) 10 mg Tb24     aspirin (ECOTRIN) 81 MG EC tablet Take 81 mg by mouth once daily.    carvedilol (COREG) 6.25 MG tablet     cephALEXin (KEFLEX) 500 MG capsule Take 500 mg by mouth every 12 (twelve) hours.    COMBIGAN 0.2-0.5 % Drop     cycloSPORINE (RESTASIS) 0.05 % ophthalmic emulsion Place 0.4 mLs (1 drop total) into both eyes 2 (two) times daily.    dexamethasone (DECADRON) 4 MG Tab     dorzolamide-timolol 2-0.5% (COSOPT) 22.3-6.8 mg/mL ophthalmic solution     DUREZOL 0.05 % Drop  ophthalmic solution     hydrocortisone 1 % cream     ILEVRO 0.3 % DrpS INSTILL ONE DROP INTO LEFT EYE ONCE DAILY    ketoconazole (NIZORAL) 2 % shampoo     latanoprost 0.005 % ophthalmic solution     lenalidomide (REVLIMID) 2.5 mg Cap Take 2.5 mg by mouth Daily auth # [id number]   0/18/19 male.    levothyroxine (SYNTHROID) 100 MCG tablet     levothyroxine (SYNTHROID) 125 MCG tablet     lifitegrast 5 % Dpet Place 1 drop into the right eye 2 (two) times daily.    multivitamin with minerals tablet Take 1 tablet by mouth once daily.    MYRBETRIQ 25 mg Tb24 ER tablet     naproxen (NAPROSYN) 500 MG tablet     neomycin-polymyxin-hydrocortisone (CORTISPORIN) otic solution     nystatin (MYCOSTATIN) cream     omeprazole (PRILOSEC) 40 MG capsule     RESTASIS 0.05 % ophthalmic emulsion     REVLIMID (REVLIMID) 2.5 mg Cap Take 1 capsule (2.5mg)  by mouth once daily.    rosuvastatin (CRESTOR) 20 MG tablet Take 20 mg by mouth.    sulfamethoxazole-trimethoprim 800-160mg (BACTRIM DS) 800-160 mg Tab     timolol maleate 0.5% (TIMOPTIC) 0.5 % Drop     tobramycin-dexamethasone 0.3-0.1% (TOBRADEX) 0.3-0.1 % DrpS Place 1 drop into the right eye 4 (four) times daily.    triamcinolone acetonide 0.025% (KENALOG) 0.025 % cream     triamcinolone acetonide 0.1% (KENALOG) 0.1 % cream      No current facility-administered medications for this visit.      Review of patient's allergies indicates:   Allergen Reactions    Levofloxacin Hives    Decongest tabs      All over the counter medications containing decongestive.     Quinolones Hives         REVIEW OF SYSTEMS:   General ROS: negative  Psychological ROS: negative  Ophthalmic ROS:  right eye blurry vision, see HPI  ENT ROS: negative   Allergy and Immunology ROS: negative  Hematological and Lymphatic ROS: negative  Endocrine ROS: negative  Respiratory ROS: negative  Cardiovascular ROS: negative  Gastrointestinal ROS: negative  Genito-Urinary ROS: negative  Musculoskeletal ROS:  see hpi regarding neck spurs and herniated disc   Neurological ROS: negative  Dermatological ROS: negative    PHYSICAL EXAM:   Vitals:    05/31/19 1307   BP: (!) 126/58   Pulse: 62   Temp: 97.9 °F (36.6 °C)       General - well developed, well nourished, no apparent distress  Head & Face - no sinus tenderness  Eyes - right eye appears red and irritated  ENT - normal external auditory canals;  Normal dentition and gums  Neck - normal thyroid  Chest and Lung - normal respiratory effort, clear to auscultation bilaterally   Cardiovascular - no swelling; murmur heard, chronic in nature  Abdomen -  soft, nontender, no palpable hepatomegaly or splenomegaly  Lymph - no palpable lymphadenopathy  Extremities - unremarkable nails and digits  Heme - no bruising, petechiae, pallor  Skin - no rashes or lesions  Psych - appropriate mood and affect      ECOG Performance Status: (foot note - ECOG PS provided by [employer]cology [employer]) 1 - Symptomatic but completely ambulatory    Karnofsky Performance Score:  90%- Able to Carry on Normal Activity: Minor Symptoms of Disease  DATA: .  Lab Results   Component Value Date    WBC 3.12 (L) 05/31/2019    HGB 14.4 05/31/2019    HCT 44.5 05/31/2019    MCV 93 05/31/2019     (L) 05/31/2019     Gran # (ANC)   Date Value Ref Range Status   05/31/2019 1.4 (L) 1.8 - 7.7 K/uL Final     Gran%   Date Value Ref Range Status   05/31/2019 43.6 38.0 - 73.0 % Final     Lymph #   Date Value Ref Range Status   05/31/2019 0.9 (L) 1.0 - 4.8 K/uL Final     Lymph%   Date Value Ref Range Status   05/31/2019 29.5 18.0 - 48.0 % Final     CMP  Sodium   Date Value Ref Range Status   05/31/2019 136 136 - 145 mmol/L Final     Potassium   Date Value Ref Range Status   05/31/2019 4.4 3.5 - 5.1 mmol/L Final     Chloride   Date Value Ref Range Status   05/31/2019 105 95 - 110 mmol/L Final     CO2   Date Value Ref Range Status   05/31/2019 25 23 - 29 mmol/L Final     Glucose   Date Value Ref Range Status    05/31/2019 96 70 - 110 mg/dL Final     BUN, Bld   Date Value Ref Range Status   05/31/2019 21 8 - 23 mg/dL Final     Creatinine   Date Value Ref Range Status   05/31/2019 1.2 0.5 - 1.4 mg/dL Final     Calcium   Date Value Ref Range Status   05/31/2019 9.8 8.7 - 10.5 mg/dL Final     Total Protein   Date Value Ref Range Status   05/31/2019 6.4 6.0 - 8.4 g/dL Final     Albumin   Date Value Ref Range Status   05/31/2019 3.7 3.5 - 5.2 g/dL Final     Total Bilirubin   Date Value Ref Range Status   05/31/2019 0.7 0.1 - 1.0 mg/dL Final     Comment:     For infants and newborns, interpretation of results should be based  on gestational age, weight and in agreement with clinical  observations.  Premature Infant recommended reference ranges:  Up to 24 hours.............<8.0 mg/dL  Up to 48 hours............<12.0 mg/dL  3-5 days..................<15.0 mg/dL  6-29 days.................<15.0 mg/dL       Alkaline Phosphatase   Date Value Ref Range Status   05/31/2019 48 (L) 55 - 135 U/L Final     AST   Date Value Ref Range Status   05/31/2019 23 10 - 40 U/L Final     ALT   Date Value Ref Range Status   05/31/2019 32 10 - 44 U/L Final     Anion Gap   Date Value Ref Range Status   05/31/2019 6 (L) 8 - 16 mmol/L Final     eGFR if    Date Value Ref Range Status   05/31/2019 >60.0 >60 mL/min/1.73 m^2 Final     eGFR if non    Date Value Ref Range Status   05/31/2019 57.2 (A) >60 mL/min/1.73 m^2 Final     Comment:     Calculation used to obtain the estimated glomerular filtration  rate (eGFR) is the CKD-EPI equation.        IgG - Serum   Date Value Ref Range Status   05/31/2019 835 650 - 1600 mg/dL Final     Comment:     IgG Cord Blood Reference Range: 650-1600 mg/dL.     IgA   Date Value Ref Range Status   05/31/2019 146 40 - 350 mg/dL Final     Comment:     IgA Cord Blood Reference Range: <5 mg/dL.     IgM   Date Value Ref Range Status   05/31/2019 42 (L) 50 - 300 mg/dL Final     Comment:     IgM Cord  Blood Reference Range: <25 mg/dL.     Kappa Free Light Chains   Date Value Ref Range Status   03/25/2019 3.07 (H) 0.33 - 1.94 mg/dL Final   02/11/2019 2.58 (H) 0.33 - 1.94 mg/dL Final   12/17/2018 2.55 (H) 0.33 - 1.94 mg/dL Final     Lambda Free Light Chains   Date Value Ref Range Status   03/25/2019 3.10 (H) 0.57 - 2.63 mg/dL Final   02/11/2019 2.74 (H) 0.57 - 2.63 mg/dL Final   12/17/2018 2.30 0.57 - 2.63 mg/dL Final     Kappa/Lambda FLC Ratio   Date Value Ref Range Status   03/25/2019 0.99 0.26 - 1.65 Final   02/11/2019 0.94 0.26 - 1.65 Final   12/17/2018 1.11 0.26 - 1.65 Final         PLEASE SEE EPIC MEDIA TAB FOR ALL IMAGING, PATHOLOGY REPORTS FROM OSH  ASSESSMENT AND PLAN:   Encounter Diagnoses   Name Primary?   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   -completed  9 cycles of VRd and achieved a IMWG CR on restaging pet, bone marrow biopsy, and biochemical studies may 2017 staging; transitioned to revlimid maintenance May 2017; remains in remission as March 2019 BCS; today's pending   -last imaging pet scan June 2018 with MYLENE; next SS due June 2019  -continue 2.5mg maintenance day 1-21 (since July 2018); required dose reductions/schedule alterations due to persistent cytopenias ; today with stable mild asymptomatic cytopenias so will continue current dose ___  -supportive meds:  ASA continues, bisphosphanate q 3 months x 1 year has been completed  -biopsy of pet RP mass cw hematoma   -decreased naproxyn use due to RP bleed  -reviewed instructions to call with fevers, bleeding and has been educated again today on symptoms for which to seek attn in our clinic earlier     Eye irritation  -continue f/u with local opthalmology for eye irritation; suspect residual effect from radiation and not  related to myeloma   - went to optho yesterday; got sample of new drops; continues with current drops    Neck spurs and herniated disc  -will have surgery on 6/25/19 by neurosurg in [place]  -pt will hold ASA per neurosurg and resume after procedure     Follow Up:    -cbc, cmp, serum free light chains, quantitative immunoglobulins, serum electropheresis, serum immunofixation lab in [place] in 8 weeks  -Same labs, appt with Dr. Park in 16 weeks    Elizabeth Lyon DNP, NP  Hematology/Oncology    Distress Screening Results: Psychosocial Distress screening score of Distress Score: 0 noted and reviewed. No intervention indicated.

## 2019-05-31 ENCOUNTER — OFFICE VISIT (OUTPATIENT)
Dept: HEMATOLOGY/ONCOLOGY | Facility: CLINIC | Age: 79
End: 2019-05-31
Payer: MEDICARE

## 2019-05-31 ENCOUNTER — LAB VISIT (OUTPATIENT)
Dept: LAB | Facility: HOSPITAL | Age: 79
End: 2019-05-31
Attending: INTERNAL MEDICINE
Payer: MEDICARE

## 2019-05-31 VITALS
TEMPERATURE: 98 F | BODY MASS INDEX: 23.77 KG/M2 | DIASTOLIC BLOOD PRESSURE: 58 MMHG | SYSTOLIC BLOOD PRESSURE: 126 MMHG | WEIGHT: 166 LBS | HEART RATE: 62 BPM | OXYGEN SATURATION: 98 % | HEIGHT: 70 IN

## 2019-05-31 DIAGNOSIS — C90.01 MULTIPLE MYELOMA IN REMISSION: Primary | ICD-10-CM

## 2019-05-31 DIAGNOSIS — H57.89 EYE IRRITATION: ICD-10-CM

## 2019-05-31 DIAGNOSIS — M77.9 BONE SPUR: ICD-10-CM

## 2019-05-31 DIAGNOSIS — C90.01 MULTIPLE MYELOMA IN REMISSION: ICD-10-CM

## 2019-05-31 LAB
ALBUMIN SERPL BCP-MCNC: 3.7 G/DL (ref 3.5–5.2)
ALP SERPL-CCNC: 48 U/L (ref 55–135)
ALT SERPL W/O P-5'-P-CCNC: 32 U/L (ref 10–44)
ANION GAP SERPL CALC-SCNC: 6 MMOL/L (ref 8–16)
AST SERPL-CCNC: 23 U/L (ref 10–40)
BASOPHILS # BLD AUTO: 0.03 K/UL (ref 0–0.2)
BASOPHILS NFR BLD: 1 % (ref 0–1.9)
BILIRUB SERPL-MCNC: 0.7 MG/DL (ref 0.1–1)
BUN SERPL-MCNC: 21 MG/DL (ref 8–23)
CALCIUM SERPL-MCNC: 9.8 MG/DL (ref 8.7–10.5)
CHLORIDE SERPL-SCNC: 105 MMOL/L (ref 95–110)
CO2 SERPL-SCNC: 25 MMOL/L (ref 23–29)
CREAT SERPL-MCNC: 1.2 MG/DL (ref 0.5–1.4)
DIFFERENTIAL METHOD: ABNORMAL
EOSINOPHIL # BLD AUTO: 0.1 K/UL (ref 0–0.5)
EOSINOPHIL NFR BLD: 1.9 % (ref 0–8)
ERYTHROCYTE [DISTWIDTH] IN BLOOD BY AUTOMATED COUNT: 14.3 % (ref 11.5–14.5)
EST. GFR  (AFRICAN AMERICAN): >60 ML/MIN/1.73 M^2
EST. GFR  (NON AFRICAN AMERICAN): 57.2 ML/MIN/1.73 M^2
GLUCOSE SERPL-MCNC: 96 MG/DL (ref 70–110)
HCT VFR BLD AUTO: 44.5 % (ref 40–54)
HGB BLD-MCNC: 14.4 G/DL (ref 14–18)
IGA SERPL-MCNC: 146 MG/DL (ref 40–350)
IGG SERPL-MCNC: 835 MG/DL (ref 650–1600)
IGM SERPL-MCNC: 42 MG/DL (ref 50–300)
IMM GRANULOCYTES # BLD AUTO: 0.01 K/UL (ref 0–0.04)
IMM GRANULOCYTES NFR BLD AUTO: 0.3 % (ref 0–0.5)
LYMPHOCYTES # BLD AUTO: 0.9 K/UL (ref 1–4.8)
LYMPHOCYTES NFR BLD: 29.5 % (ref 18–48)
MCH RBC QN AUTO: 30 PG (ref 27–31)
MCHC RBC AUTO-ENTMCNC: 32.4 G/DL (ref 32–36)
MCV RBC AUTO: 93 FL (ref 82–98)
MONOCYTES # BLD AUTO: 0.7 K/UL (ref 0.3–1)
MONOCYTES NFR BLD: 23.7 % (ref 4–15)
NEUTROPHILS # BLD AUTO: 1.4 K/UL (ref 1.8–7.7)
NEUTROPHILS NFR BLD: 43.6 % (ref 38–73)
NRBC BLD-RTO: 0 /100 WBC
PLATELET # BLD AUTO: 110 K/UL (ref 150–350)
PMV BLD AUTO: 10.7 FL (ref 9.2–12.9)
POTASSIUM SERPL-SCNC: 4.4 MMOL/L (ref 3.5–5.1)
PROT SERPL-MCNC: 6.4 G/DL (ref 6–8.4)
RBC # BLD AUTO: 4.8 M/UL (ref 4.6–6.2)
SODIUM SERPL-SCNC: 136 MMOL/L (ref 136–145)
WBC # BLD AUTO: 3.12 K/UL (ref 3.9–12.7)

## 2019-05-31 PROCEDURE — 86334 IMMUNOFIX E-PHORESIS SERUM: CPT

## 2019-05-31 PROCEDURE — 84165 PATHOLOGIST INTERPRETATION SPE: ICD-10-PCS | Mod: 26,,, | Performed by: PATHOLOGY

## 2019-05-31 PROCEDURE — 84165 PROTEIN E-PHORESIS SERUM: CPT

## 2019-05-31 PROCEDURE — 84165 PROTEIN E-PHORESIS SERUM: CPT | Mod: 26,,, | Performed by: PATHOLOGY

## 2019-05-31 PROCEDURE — 86334 PATHOLOGIST INTERPRETATION IFE: ICD-10-PCS | Mod: 26,,, | Performed by: PATHOLOGY

## 2019-05-31 PROCEDURE — 83520 IMMUNOASSAY QUANT NOS NONAB: CPT | Mod: 59

## 2019-05-31 PROCEDURE — 99214 PR OFFICE/OUTPT VISIT, EST, LEVL IV, 30-39 MIN: ICD-10-PCS | Mod: S$PBB,,, | Performed by: NURSE PRACTITIONER

## 2019-05-31 PROCEDURE — 99999 PR PBB SHADOW E&M-EST. PATIENT-LVL III: ICD-10-PCS | Mod: PBBFAC,,, | Performed by: NURSE PRACTITIONER

## 2019-05-31 PROCEDURE — 99214 OFFICE O/P EST MOD 30 MIN: CPT | Mod: S$PBB,,, | Performed by: NURSE PRACTITIONER

## 2019-05-31 PROCEDURE — 86334 IMMUNOFIX E-PHORESIS SERUM: CPT | Mod: 26,,, | Performed by: PATHOLOGY

## 2019-05-31 PROCEDURE — 99999 PR PBB SHADOW E&M-EST. PATIENT-LVL III: CPT | Mod: PBBFAC,,, | Performed by: NURSE PRACTITIONER

## 2019-05-31 PROCEDURE — 80053 COMPREHEN METABOLIC PANEL: CPT

## 2019-05-31 PROCEDURE — 99213 OFFICE O/P EST LOW 20 MIN: CPT | Mod: PBBFAC | Performed by: NURSE PRACTITIONER

## 2019-05-31 PROCEDURE — 36415 COLL VENOUS BLD VENIPUNCTURE: CPT

## 2019-05-31 PROCEDURE — 85025 COMPLETE CBC W/AUTO DIFF WBC: CPT

## 2019-05-31 PROCEDURE — 82784 ASSAY IGA/IGD/IGG/IGM EACH: CPT | Mod: 59

## 2019-05-31 NOTE — Clinical Note
-cbc, cmp, serum free light chains, quantitative immunoglobulins, serum electropheresis, serum immunofixation lab in [place] in 8 weeks-Same labs, appt with Dr. Park in 16 weeks

## 2019-06-03 LAB
ALBUMIN SERPL ELPH-MCNC: 3.78 G/DL (ref 3.35–5.55)
ALPHA1 GLOB SERPL ELPH-MCNC: 0.25 G/DL (ref 0.17–0.41)
ALPHA2 GLOB SERPL ELPH-MCNC: 0.65 G/DL (ref 0.43–0.99)
B-GLOBULIN SERPL ELPH-MCNC: 0.62 G/DL (ref 0.5–1.1)
GAMMA GLOB SERPL ELPH-MCNC: 0.8 G/DL (ref 0.67–1.58)
INTERPRETATION SERPL IFE-IMP: NORMAL
KAPPA LC SER QL IA: 2.42 MG/DL (ref 0.33–1.94)
KAPPA LC/LAMBDA SER IA: 0.92 (ref 0.26–1.65)
LAMBDA LC SER QL IA: 2.62 MG/DL (ref 0.57–2.63)
PATHOLOGIST INTERPRETATION IFE: NORMAL
PATHOLOGIST INTERPRETATION SPE: NORMAL
PROT SERPL-MCNC: 6.1 G/DL (ref 6–8.4)

## 2019-06-10 ENCOUNTER — TELEPHONE (OUTPATIENT)
Dept: HEMATOLOGY/ONCOLOGY | Facility: CLINIC | Age: 79
End: 2019-06-10

## 2019-06-12 DIAGNOSIS — C90.00 MULTIPLE MYELOMA, REMISSION STATUS UNSPECIFIED: ICD-10-CM

## 2019-06-12 RX ORDER — LENALIDOMIDE 2.5 MG/1
2.5 CAPSULE ORAL DAILY
Qty: 21 CAPSULE | Refills: 0 | Status: SHIPPED | OUTPATIENT
Start: 2019-06-12 | End: 2019-07-10 | Stop reason: SDUPTHER

## 2019-07-10 DIAGNOSIS — C90.00 MULTIPLE MYELOMA, REMISSION STATUS UNSPECIFIED: ICD-10-CM

## 2019-07-11 RX ORDER — LENALIDOMIDE 2.5 MG/1
2.5 CAPSULE ORAL DAILY
Qty: 21 CAPSULE | Refills: 0 | Status: SHIPPED | OUTPATIENT
Start: 2019-07-11 | End: 2019-08-07 | Stop reason: SDUPTHER

## 2019-07-26 ENCOUNTER — LAB VISIT (OUTPATIENT)
Dept: LAB | Facility: HOSPITAL | Age: 79
End: 2019-07-26
Attending: INTERNAL MEDICINE
Payer: MEDICARE

## 2019-07-26 DIAGNOSIS — C90.01 MULTIPLE MYELOMA IN REMISSION: ICD-10-CM

## 2019-07-26 LAB
ALBUMIN SERPL BCP-MCNC: 3.6 G/DL (ref 3.5–5.2)
ALP SERPL-CCNC: 58 U/L (ref 55–135)
ALT SERPL W/O P-5'-P-CCNC: 22 U/L (ref 10–44)
ANION GAP SERPL CALC-SCNC: 4 MMOL/L (ref 8–16)
AST SERPL-CCNC: 16 U/L (ref 10–40)
BASOPHILS # BLD AUTO: 0.02 K/UL (ref 0–0.2)
BASOPHILS NFR BLD: 0.6 % (ref 0–1.9)
BILIRUB SERPL-MCNC: 0.5 MG/DL (ref 0.1–1)
BUN SERPL-MCNC: 20 MG/DL (ref 8–23)
CALCIUM SERPL-MCNC: 9.3 MG/DL (ref 8.7–10.5)
CHLORIDE SERPL-SCNC: 103 MMOL/L (ref 95–110)
CO2 SERPL-SCNC: 30 MMOL/L (ref 23–29)
CREAT SERPL-MCNC: 1.1 MG/DL (ref 0.5–1.4)
DIFFERENTIAL METHOD: ABNORMAL
EOSINOPHIL # BLD AUTO: 0.1 K/UL (ref 0–0.5)
EOSINOPHIL NFR BLD: 4.4 % (ref 0–8)
ERYTHROCYTE [DISTWIDTH] IN BLOOD BY AUTOMATED COUNT: 13.4 % (ref 11.5–14.5)
EST. GFR  (AFRICAN AMERICAN): >60 ML/MIN/1.73 M^2
EST. GFR  (NON AFRICAN AMERICAN): >60 ML/MIN/1.73 M^2
GLUCOSE SERPL-MCNC: 93 MG/DL (ref 70–110)
HCT VFR BLD AUTO: 41.6 % (ref 40–54)
HGB BLD-MCNC: 13.5 G/DL (ref 14–18)
IGA SERPL-MCNC: 158 MG/DL (ref 40–350)
IGG SERPL-MCNC: 885 MG/DL (ref 650–1600)
IGM SERPL-MCNC: 39 MG/DL (ref 50–300)
IMM GRANULOCYTES # BLD AUTO: 0.01 K/UL (ref 0–0.04)
IMM GRANULOCYTES NFR BLD AUTO: 0.3 % (ref 0–0.5)
LYMPHOCYTES # BLD AUTO: 1 K/UL (ref 1–4.8)
LYMPHOCYTES NFR BLD: 31.3 % (ref 18–48)
MCH RBC QN AUTO: 28.8 PG (ref 27–31)
MCHC RBC AUTO-ENTMCNC: 32.5 G/DL (ref 32–36)
MCV RBC AUTO: 89 FL (ref 82–98)
MONOCYTES # BLD AUTO: 0.7 K/UL (ref 0.3–1)
MONOCYTES NFR BLD: 20.4 % (ref 4–15)
NEUTROPHILS # BLD AUTO: 1.4 K/UL (ref 1.8–7.7)
NEUTROPHILS NFR BLD: 43 % (ref 38–73)
NRBC BLD-RTO: 0 /100 WBC
PLATELET # BLD AUTO: 110 K/UL (ref 150–350)
PMV BLD AUTO: 10.7 FL (ref 9.2–12.9)
POTASSIUM SERPL-SCNC: 4.4 MMOL/L (ref 3.5–5.1)
PROT SERPL-MCNC: 6.3 G/DL (ref 6–8.4)
RBC # BLD AUTO: 4.68 M/UL (ref 4.6–6.2)
SODIUM SERPL-SCNC: 137 MMOL/L (ref 136–145)
WBC # BLD AUTO: 3.19 K/UL (ref 3.9–12.7)

## 2019-07-26 PROCEDURE — 82784 ASSAY IGA/IGD/IGG/IGM EACH: CPT | Mod: 59

## 2019-07-26 PROCEDURE — 85025 COMPLETE CBC W/AUTO DIFF WBC: CPT

## 2019-07-26 PROCEDURE — 84165 PROTEIN E-PHORESIS SERUM: CPT

## 2019-07-26 PROCEDURE — 83520 IMMUNOASSAY QUANT NOS NONAB: CPT | Mod: 59

## 2019-07-26 PROCEDURE — 36415 COLL VENOUS BLD VENIPUNCTURE: CPT

## 2019-07-26 PROCEDURE — 86334 IMMUNOFIX E-PHORESIS SERUM: CPT

## 2019-07-26 PROCEDURE — 86334 IMMUNOFIX E-PHORESIS SERUM: CPT | Mod: 26,,, | Performed by: PATHOLOGY

## 2019-07-26 PROCEDURE — 84165 PROTEIN E-PHORESIS SERUM: CPT | Mod: 26,,, | Performed by: PATHOLOGY

## 2019-07-26 PROCEDURE — 80053 COMPREHEN METABOLIC PANEL: CPT

## 2019-07-26 PROCEDURE — 84165 PATHOLOGIST INTERPRETATION SPE: ICD-10-PCS | Mod: 26,,, | Performed by: PATHOLOGY

## 2019-07-26 PROCEDURE — 86334 PATHOLOGIST INTERPRETATION IFE: ICD-10-PCS | Mod: 26,,, | Performed by: PATHOLOGY

## 2019-07-29 LAB
ALBUMIN SERPL ELPH-MCNC: 3.62 G/DL (ref 3.35–5.55)
ALPHA1 GLOB SERPL ELPH-MCNC: 0.26 G/DL (ref 0.17–0.41)
ALPHA2 GLOB SERPL ELPH-MCNC: 0.63 G/DL (ref 0.43–0.99)
B-GLOBULIN SERPL ELPH-MCNC: 0.65 G/DL (ref 0.5–1.1)
GAMMA GLOB SERPL ELPH-MCNC: 0.84 G/DL (ref 0.67–1.58)
INTERPRETATION SERPL IFE-IMP: NORMAL
KAPPA LC SER QL IA: 2.61 MG/DL (ref 0.33–1.94)
KAPPA LC/LAMBDA SER IA: 0.78 (ref 0.26–1.65)
LAMBDA LC SER QL IA: 3.35 MG/DL (ref 0.57–2.63)
PATHOLOGIST INTERPRETATION IFE: NORMAL
PATHOLOGIST INTERPRETATION SPE: NORMAL
PROT SERPL-MCNC: 6 G/DL (ref 6–8.4)

## 2019-07-30 ENCOUNTER — TELEPHONE (OUTPATIENT)
Dept: HEMATOLOGY/ONCOLOGY | Facility: CLINIC | Age: 79
End: 2019-07-30

## 2019-08-07 DIAGNOSIS — C90.00 MULTIPLE MYELOMA, REMISSION STATUS UNSPECIFIED: ICD-10-CM

## 2019-08-08 RX ORDER — LENALIDOMIDE 2.5 MG/1
2.5 CAPSULE ORAL DAILY
Qty: 21 CAPSULE | Refills: 0 | Status: SHIPPED | OUTPATIENT
Start: 2019-08-08 | End: 2019-09-04 | Stop reason: SDUPTHER

## 2019-09-04 DIAGNOSIS — C90.00 MULTIPLE MYELOMA, REMISSION STATUS UNSPECIFIED: ICD-10-CM

## 2019-09-04 RX ORDER — LENALIDOMIDE 2.5 MG/1
2.5 CAPSULE ORAL DAILY
Qty: 21 CAPSULE | Refills: 0 | Status: SHIPPED | OUTPATIENT
Start: 2019-09-04 | End: 2019-09-17 | Stop reason: SDUPTHER

## 2019-09-05 ENCOUNTER — TELEPHONE (OUTPATIENT)
Dept: INFUSION THERAPY | Facility: HOSPITAL | Age: 79
End: 2019-09-05

## 2019-09-05 NOTE — TELEPHONE ENCOUNTER
----- Message from Sujata Quezada sent at 9/5/2019  9:02 AM CDT -----  Contact: Pt wife Suellen  Pt wife Suellen called to Schedule  appt w/ Dr Park and would like a Friday  Callback#[phone]  Thank You  VIBHA Quezada

## 2019-09-16 ENCOUNTER — LAB VISIT (OUTPATIENT)
Dept: LAB | Facility: HOSPITAL | Age: 79
End: 2019-09-16
Attending: INTERNAL MEDICINE
Payer: MEDICARE

## 2019-09-16 DIAGNOSIS — C90.01 MULTIPLE MYELOMA IN REMISSION: ICD-10-CM

## 2019-09-16 LAB
ALBUMIN SERPL BCP-MCNC: 3.6 G/DL (ref 3.5–5.2)
ALP SERPL-CCNC: 49 U/L (ref 55–135)
ALT SERPL W/O P-5'-P-CCNC: 20 U/L (ref 10–44)
ANION GAP SERPL CALC-SCNC: 7 MMOL/L (ref 8–16)
AST SERPL-CCNC: 19 U/L (ref 10–40)
BASOPHILS # BLD AUTO: 0.01 K/UL (ref 0–0.2)
BASOPHILS NFR BLD: 0.4 % (ref 0–1.9)
BILIRUB SERPL-MCNC: 0.6 MG/DL (ref 0.1–1)
BUN SERPL-MCNC: 21 MG/DL (ref 8–23)
CALCIUM SERPL-MCNC: 9.1 MG/DL (ref 8.7–10.5)
CHLORIDE SERPL-SCNC: 107 MMOL/L (ref 95–110)
CO2 SERPL-SCNC: 25 MMOL/L (ref 23–29)
CREAT SERPL-MCNC: 1.1 MG/DL (ref 0.5–1.4)
DIFFERENTIAL METHOD: ABNORMAL
EOSINOPHIL # BLD AUTO: 0.1 K/UL (ref 0–0.5)
EOSINOPHIL NFR BLD: 2.5 % (ref 0–8)
ERYTHROCYTE [DISTWIDTH] IN BLOOD BY AUTOMATED COUNT: 13.7 % (ref 11.5–14.5)
EST. GFR  (AFRICAN AMERICAN): >60 ML/MIN/1.73 M^2
EST. GFR  (NON AFRICAN AMERICAN): >60 ML/MIN/1.73 M^2
GLUCOSE SERPL-MCNC: 100 MG/DL (ref 70–110)
HCT VFR BLD AUTO: 43 % (ref 40–54)
HGB BLD-MCNC: 13.5 G/DL (ref 14–18)
IGA SERPL-MCNC: 146 MG/DL (ref 40–350)
IGG SERPL-MCNC: 895 MG/DL (ref 650–1600)
IGM SERPL-MCNC: 37 MG/DL (ref 50–300)
IMM GRANULOCYTES # BLD AUTO: 0.01 K/UL (ref 0–0.04)
IMM GRANULOCYTES NFR BLD AUTO: 0.4 % (ref 0–0.5)
LYMPHOCYTES # BLD AUTO: 0.8 K/UL (ref 1–4.8)
LYMPHOCYTES NFR BLD: 34.4 % (ref 18–48)
MCH RBC QN AUTO: 28.9 PG (ref 27–31)
MCHC RBC AUTO-ENTMCNC: 31.4 G/DL (ref 32–36)
MCV RBC AUTO: 92 FL (ref 82–98)
MONOCYTES # BLD AUTO: 0.4 K/UL (ref 0.3–1)
MONOCYTES NFR BLD: 17.6 % (ref 4–15)
NEUTROPHILS # BLD AUTO: 1.1 K/UL (ref 1.8–7.7)
NEUTROPHILS NFR BLD: 44.7 % (ref 38–73)
NRBC BLD-RTO: 0 /100 WBC
PLATELET # BLD AUTO: 95 K/UL (ref 150–350)
PMV BLD AUTO: 10.5 FL (ref 9.2–12.9)
POTASSIUM SERPL-SCNC: 4.7 MMOL/L (ref 3.5–5.1)
PROT SERPL-MCNC: 6.1 G/DL (ref 6–8.4)
RBC # BLD AUTO: 4.67 M/UL (ref 4.6–6.2)
SODIUM SERPL-SCNC: 139 MMOL/L (ref 136–145)
WBC # BLD AUTO: 2.44 K/UL (ref 3.9–12.7)

## 2019-09-16 PROCEDURE — 80053 COMPREHEN METABOLIC PANEL: CPT

## 2019-09-16 PROCEDURE — 82784 ASSAY IGA/IGD/IGG/IGM EACH: CPT | Mod: 59

## 2019-09-16 PROCEDURE — 36415 COLL VENOUS BLD VENIPUNCTURE: CPT

## 2019-09-16 PROCEDURE — 85025 COMPLETE CBC W/AUTO DIFF WBC: CPT

## 2019-09-16 PROCEDURE — 84165 PROTEIN E-PHORESIS SERUM: CPT | Mod: 26,,, | Performed by: PATHOLOGY

## 2019-09-16 PROCEDURE — 86334 IMMUNOFIX E-PHORESIS SERUM: CPT | Mod: 26,,, | Performed by: PATHOLOGY

## 2019-09-16 PROCEDURE — 86334 PATHOLOGIST INTERPRETATION IFE: ICD-10-PCS | Mod: 26,,, | Performed by: PATHOLOGY

## 2019-09-16 PROCEDURE — 86334 IMMUNOFIX E-PHORESIS SERUM: CPT

## 2019-09-16 PROCEDURE — 84165 PATHOLOGIST INTERPRETATION SPE: ICD-10-PCS | Mod: 26,,, | Performed by: PATHOLOGY

## 2019-09-16 PROCEDURE — 83520 IMMUNOASSAY QUANT NOS NONAB: CPT | Mod: 59

## 2019-09-16 PROCEDURE — 84165 PROTEIN E-PHORESIS SERUM: CPT

## 2019-09-17 ENCOUNTER — OFFICE VISIT (OUTPATIENT)
Dept: HEMATOLOGY/ONCOLOGY | Facility: CLINIC | Age: 79
End: 2019-09-17
Payer: MEDICARE

## 2019-09-17 VITALS
HEART RATE: 60 BPM | HEIGHT: 70 IN | WEIGHT: 168.19 LBS | TEMPERATURE: 98 F | RESPIRATION RATE: 16 BRPM | DIASTOLIC BLOOD PRESSURE: 65 MMHG | SYSTOLIC BLOOD PRESSURE: 142 MMHG | BODY MASS INDEX: 24.08 KG/M2 | OXYGEN SATURATION: 98 %

## 2019-09-17 DIAGNOSIS — D61.818 PANCYTOPENIA: ICD-10-CM

## 2019-09-17 DIAGNOSIS — C90.01 MULTIPLE MYELOMA IN REMISSION: Primary | ICD-10-CM

## 2019-09-17 LAB
ALBUMIN SERPL ELPH-MCNC: 3.57 G/DL (ref 3.35–5.55)
ALPHA1 GLOB SERPL ELPH-MCNC: 0.22 G/DL (ref 0.17–0.41)
ALPHA2 GLOB SERPL ELPH-MCNC: 0.57 G/DL (ref 0.43–0.99)
B-GLOBULIN SERPL ELPH-MCNC: 0.6 G/DL (ref 0.5–1.1)
GAMMA GLOB SERPL ELPH-MCNC: 0.84 G/DL (ref 0.67–1.58)
INTERPRETATION SERPL IFE-IMP: NORMAL
KAPPA LC SER QL IA: 2.2 MG/DL (ref 0.33–1.94)
KAPPA LC/LAMBDA SER IA: 0.67 (ref 0.26–1.65)
LAMBDA LC SER QL IA: 3.28 MG/DL (ref 0.57–2.63)
PROT SERPL-MCNC: 5.8 G/DL (ref 6–8.4)

## 2019-09-17 PROCEDURE — 99214 PR OFFICE/OUTPT VISIT, EST, LEVL IV, 30-39 MIN: ICD-10-PCS | Mod: S$PBB,,, | Performed by: INTERNAL MEDICINE

## 2019-09-17 PROCEDURE — 99215 OFFICE O/P EST HI 40 MIN: CPT | Mod: PBBFAC | Performed by: INTERNAL MEDICINE

## 2019-09-17 PROCEDURE — 99999 PR PBB SHADOW E&M-EST. PATIENT-LVL V: CPT | Mod: PBBFAC,,, | Performed by: INTERNAL MEDICINE

## 2019-09-17 PROCEDURE — 99214 OFFICE O/P EST MOD 30 MIN: CPT | Mod: S$PBB,,, | Performed by: INTERNAL MEDICINE

## 2019-09-17 PROCEDURE — 99999 PR PBB SHADOW E&M-EST. PATIENT-LVL V: ICD-10-PCS | Mod: PBBFAC,,, | Performed by: INTERNAL MEDICINE

## 2019-09-17 NOTE — PROGRESS NOTES
"SECTION OF HEMATOLOGY AND BONE MARROW TRANSPLANT  Return Patient Visit   09/18/2019  Referred by:  No ref. provider found  Referred for:  MM, autoSCT    CHIEF COMPLAINT:   No chief complaint on file.      HISTORY OF PRESENT ILLNESS:   79 y.o. male  with pmh as below, ; diagnosis of systemic myeloma, cytogenetic studies normal.  Initially presentedfor cataract surgery.  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  Patient subsequently completed 23 planned fractions of XRT + dex with rad onc in [place].     Both have resulted in improvement in proptosis and vision.  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  PET scan showed known orbit lesion as well as multiple other sites of  Bone disease.    He has completed 9 cycles of VRd and given CR on restaging, age, patient preference, decision was made not to proceed with transplant.  He was transitioned to revlimid maintenance may 2017 and has been tolerating well with mild cytopenias and remained in biochemical remission since that time.  .   Due to cytopenias we transitioned to 5mg and then 2.5mg  21 day regimen of a 28 day cycle.   Feels well. Pain resolved. Tolerating and compliant with revlimid . Comes to clinic  friend.  Remains active.  Doing chores, fishing. Also of note since last appt has minimally invasive cervical neck fusion for chronic DJD with profund improvement in symptoms.   Per pt orthopedist told him his "bone looked good during surgery".      PAST MEDICAL HISTORY:   Past Medical History:   Diagnosis Date    Allergy     Anxiety     Cataract     Glaucoma     Hypertension     denies htn states takes coreg for rhythm    Prostate cancer     Thyroid disease        PAST SURGICAL HISTORY:   Past Surgical History:   Procedure Laterality Date    BIOPSY-BONE MARROW Left 5/29/2017    Performed by Warren DIEGO" MD [facility] OR 2ND FLR    BIOPSY-BONE MARROW Left 3/2/2017    Performed by Warren Park MD at [facility] OR 2ND FLR    Fqifrr-nlsmex-cf N/A 6/21/2018    Performed by [facility] at [facility] OR 2ND FLR    BRAIN SURGERY  2010    optic nerve tumor    broken leg Left     CARPAL TUNNEL RELEASE Bilateral     EYE SURGERY Right     REPAIR-ECTROPION Right 6/30/2017    Performed by Maya Sandoval MD at [facility] OR 1ST FLR    SHOULDER SURGERY Right        PAST SOCIAL HISTORY:   reports that he has quit smoking. He has never used smokeless tobacco. He reports that he drinks about 4.2 oz of alcohol per week. He reports that he does not use drugs.    FAMILY HISTORY:  Family History   Problem Relation Age of Onset    Cancer Mother     Prostate cancer Father     Coronary artery disease Father     Dementia Sister     Stroke Brother     Dementia Brother     No Known Problems Brother     Dementia Brother     No Known Problems Brother     Pancreatitis Brother     Cancer Brother     No Known Problems Sister     No Known Problems Sister        CURRENT MEDICATIONS:   Current Outpatient Medications   Medication Sig    alfuzosin (UROXATRAL) 10 mg Tb24     aspirin (ECOTRIN) 81 MG EC tablet Take 81 mg by mouth once daily.    carvedilol (COREG) 6.25 MG tablet     cephALEXin (KEFLEX) 500 MG capsule Take 500 mg by mouth every 12 (twelve) hours.    cycloSPORINE (RESTASIS) 0.05 % ophthalmic emulsion Place 0.4 mLs (1 drop total) into both eyes 2 (two) times daily.    dexamethasone (DECADRON) 4 MG Tab     lenalidomide (REVLIMID) 2.5 mg Cap Take 2.5 mg by mouth Daily auth # [id number]   0/18/19 male.    levothyroxine (SYNTHROID) 125 MCG tablet     MYRBETRIQ 25 mg Tb24 ER tablet     rosuvastatin (CRESTOR) 20 MG tablet Take 20 mg by mouth.    COMBIGAN 0.2-0.5 % Drop     dorzolamide-timolol 2-0.5% (COSOPT) 22.3-6.8 mg/mL ophthalmic solution     DUREZOL 0.05 % Drop ophthalmic solution     hydrocortisone 1  % cream     ILEVRO 0.3 % DrpS INSTILL ONE DROP INTO LEFT EYE ONCE DAILY    ketoconazole (NIZORAL) 2 % shampoo     latanoprost 0.005 % ophthalmic solution     levothyroxine (SYNTHROID) 100 MCG tablet     lifitegrast 5 % Dpet Place 1 drop into the right eye 2 (two) times daily.    multivitamin with minerals tablet Take 1 tablet by mouth once daily.    naproxen (NAPROSYN) 500 MG tablet     neomycin-polymyxin-hydrocortisone (CORTISPORIN) otic solution     nystatin (MYCOSTATIN) cream     omeprazole (PRILOSEC) 40 MG capsule     RESTASIS 0.05 % ophthalmic emulsion     sulfamethoxazole-trimethoprim 800-160mg (BACTRIM DS) 800-160 mg Tab     timolol maleate 0.5% (TIMOPTIC) 0.5 % Drop     tobramycin-dexamethasone 0.3-0.1% (TOBRADEX) 0.3-0.1 % DrpS Place 1 drop into the right eye 4 (four) times daily.    triamcinolone acetonide 0.025% (KENALOG) 0.025 % cream     triamcinolone acetonide 0.1% (KENALOG) 0.1 % cream      No current facility-administered medications for this visit.      ALLERGIES:   Review of patient's allergies indicates:   Allergen Reactions    Decongest tabs      All over the counter medications containing decongestive.              REVIEW OF SYSTEMS:   General ROS: negative  Psychological ROS: negative  Ophthalmic ROS: see HPI  ENT ROS: + right eye blurry vision   Allergy and Immunology ROS: negative  Hematological and Lymphatic ROS: negative  Endocrine ROS: negative  Respiratory ROS: negative  Cardiovascular ROS: negative  Gastrointestinal ROS: negative  Genito-Urinary ROS: negative  Musculoskeletal ROS: negative  Neurological ROS: negative  Dermatological ROS: negative    PHYSICAL EXAM:   Vitals:    09/17/19 1405   BP: (!) 142/65   Pulse: 60   Resp: 16   Temp: 98 °F (36.7 °C)       General - well developed, well nourished, no apparent distress  Head & Face - no sinus tenderness  Eyes - right eye appears red and irritated  ENT - normal external auditory canals and tympanic membranes bilaterally  oropharynx clear,  Normal dentition and gums  Neck - normal thyroid  Chest and Lung - normal respiratory effort, clear to auscultation bilaterally   Cardiovascular - RRR with no MGR, normal S1 and S2; no pedal edema  Abdomen -  soft, nontender, no palpable hepatomegaly or splenomegaly  Lymph - no palpable lymphadenopathy  Extremities - unremarkable nails and digits  Heme - no bruising, petechiae, pallor  Skin - no rashes or lesions  Psych - appropriate mood and affect      ECOG Performance Status: (foot note - ECOG PS provided by [employer]cology [employer]) 1 - Symptomatic but completely ambulatory    Karnofsky Performance Score:  90%- Able to Carry on Normal Activity: Minor Symptoms of Disease  DATA: .  Lab Results   Component Value Date    WBC 2.44 (L) 09/16/2019    HGB 13.5 (L) 09/16/2019    HCT 43.0 09/16/2019    MCV 92 09/16/2019    PLT 95 (L) 09/16/2019     Gran # (ANC)   Date Value Ref Range Status   09/16/2019 1.1 (L) 1.8 - 7.7 K/uL Final     Gran%   Date Value Ref Range Status   09/16/2019 44.7 38.0 - 73.0 % Final     Lymph #   Date Value Ref Range Status   09/16/2019 0.8 (L) 1.0 - 4.8 K/uL Final     Lymph%   Date Value Ref Range Status   09/16/2019 34.4 18.0 - 48.0 % Final     CMP  Sodium   Date Value Ref Range Status   09/16/2019 139 136 - 145 mmol/L Final     Potassium   Date Value Ref Range Status   09/16/2019 4.7 3.5 - 5.1 mmol/L Final     Chloride   Date Value Ref Range Status   09/16/2019 107 95 - 110 mmol/L Final     CO2   Date Value Ref Range Status   09/16/2019 25 23 - 29 mmol/L Final     Glucose   Date Value Ref Range Status   09/16/2019 100 70 - 110 mg/dL Final     BUN, Bld   Date Value Ref Range Status   09/16/2019 21 8 - 23 mg/dL Final     Creatinine   Date Value Ref Range Status   09/16/2019 1.1 0.5 - 1.4 mg/dL Final     Calcium   Date Value Ref Range Status   09/16/2019 9.1 8.7 - 10.5 mg/dL Final     Total Protein   Date Value Ref Range Status   09/16/2019 6.1 6.0 - 8.4 g/dL Final      Albumin   Date Value Ref Range Status   09/16/2019 3.6 3.5 - 5.2 g/dL Final     Total Bilirubin   Date Value Ref Range Status   09/16/2019 0.6 0.1 - 1.0 mg/dL Final     Comment:     For infants and newborns, interpretation of results should be based  on gestational age, weight and in agreement with clinical  observations.  Premature Infant recommended reference ranges:  Up to 24 hours.............<8.0 mg/dL  Up to 48 hours............<12.0 mg/dL  3-5 days..................<15.0 mg/dL  6-29 days.................<15.0 mg/dL       Alkaline Phosphatase   Date Value Ref Range Status   09/16/2019 49 (L) 55 - 135 U/L Final     AST   Date Value Ref Range Status   09/16/2019 19 10 - 40 U/L Final     ALT   Date Value Ref Range Status   09/16/2019 20 10 - 44 U/L Final     Anion Gap   Date Value Ref Range Status   09/16/2019 7 (L) 8 - 16 mmol/L Final     eGFR if    Date Value Ref Range Status   09/16/2019 >60 >60 mL/min/1.73 m^2 Final     eGFR if non    Date Value Ref Range Status   09/16/2019 >60 >60 mL/min/1.73 m^2 Final     Comment:     Calculation used to obtain the estimated glomerular filtration  rate (eGFR) is the CKD-EPI equation.        IgG - Serum   Date Value Ref Range Status   09/16/2019 895 650 - 1600 mg/dL Final     Comment:     IgG Cord Blood Reference Range: 650-1600 mg/dL.     IgA   Date Value Ref Range Status   09/16/2019 146 40 - 350 mg/dL Final     Comment:     IgA Cord Blood Reference Range: <5 mg/dL.     IgM   Date Value Ref Range Status   09/16/2019 37 (L) 50 - 300 mg/dL Final     Comment:     IgM Cord Blood Reference Range: <25 mg/dL.     Kappa Free Light Chains   Date Value Ref Range Status   09/16/2019 2.20 (H) 0.33 - 1.94 mg/dL Final   07/26/2019 2.61 (H) 0.33 - 1.94 mg/dL Final   05/31/2019 2.42 (H) 0.33 - 1.94 mg/dL Final     Lambda Free Light Chains   Date Value Ref Range Status   09/16/2019 3.28 (H) 0.57 - 2.63 mg/dL Final   07/26/2019 3.35 (H) 0.57 - 2.63 mg/dL  Final   05/31/2019 2.62 0.57 - 2.63 mg/dL Final     Kappa/Lambda FLC Ratio   Date Value Ref Range Status   09/16/2019 0.67 0.26 - 1.65 Final   07/26/2019 0.78 0.26 - 1.65 Final   05/31/2019 0.92 0.26 - 1.65 Final         PLEASE SEE EPIC MEDIA TAB FOR ALL IMAGING, PATHOLOGY REPORTS FROM OSH  ASSESSMENT AND PLAN:   Encounter Diagnoses   Name Primary?   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   -completed  9 cycles of VRd and achieved a IMWG CR on restaging pet, bone marrow biopsy, and biochemical studies may 2017 staging; transitioned to revlimid maintenance  may 2017; remains in remission as July 2019 biochemical studes; no CRAB today;  today's biochemical studies pending    -last imaging pet scan June 2018 with MYLENE; will plan on imaging based on symptoms or biochemical relapse moving forward  -continue 2.5mg maintenance day 1-21 (since July 2018 ; required dose reductions/schedule alterations due to persistent cytopenias ; today with stable mild asymptomatic  cytopenias so will continue current dose   -reviewed instructions to call with fevers, bleeding   -continue fu with local opthalmology for eye irritation; suspect residual effect from radiation and not related to myeloma    -supportive meds:   Asa; completed 2 years bisphosph   -educated on symptoms for which to seek attn in our clinic earlier       Follow Up: -cbc, cmp, serum free light chains, quantitative immunoglobulins, serum electropheresis, serum immunofixation lab in [place] in 6 weeks  -Same labs, MD appt,   12 weeks at [facility] [place]       Bernardo Park MD  Hematology/Oncology/Bone Marrow Transplant

## 2019-09-17 NOTE — Clinical Note
-cbc, cmp, serum free light chains, quantitative immunoglobulins, serum electropheresis, serum immunofixation lab in [place] in 6 weeks-Same labs, [place],   [place] [facility] [place]

## 2019-09-18 ENCOUNTER — TELEPHONE (OUTPATIENT)
Dept: HEMATOLOGY/ONCOLOGY | Facility: CLINIC | Age: 79
End: 2019-09-18

## 2019-09-18 LAB
PATHOLOGIST INTERPRETATION IFE: NORMAL
PATHOLOGIST INTERPRETATION SPE: NORMAL

## 2019-09-18 NOTE — TELEPHONE ENCOUNTER
----- Message from Warren Park MD sent at 9/18/2019 10:56 AM CDT -----  Pt not on my [facility] can you please call him to let him know most recent labs show myeloma remains in remission. Good news.   Thank you   ----- Message -----  From: [facility]  Sent: 9/16/2019  11:32 AM  To: Warren Park MD

## 2019-10-01 DIAGNOSIS — C90.00 MULTIPLE MYELOMA, REMISSION STATUS UNSPECIFIED: ICD-10-CM

## 2019-10-01 RX ORDER — LENALIDOMIDE 2.5 MG/1
CAPSULE ORAL
Qty: 21 CAPSULE | Refills: 0 | Status: SHIPPED | OUTPATIENT
Start: 2019-10-01 | End: 2019-10-29 | Stop reason: SDUPTHER

## 2019-10-01 NOTE — TELEPHONE ENCOUNTER
----- Message from Shahab Antoine sent at 10/1/2019  3:43 PM CDT -----  Contact: [facility]  Pharmacy calling to clarify a prescription     Name of caller:: Karyn     Pharmacy name and phone number:: [facility] [phone]    What medication do they need to clarify: lenalidomide (REVLIMID) 2.5 mg Cap    What do they need to clarify:: Pharmacy is requesting a celgene auth number    Additional info::

## 2019-10-02 RX ORDER — LENALIDOMIDE 2.5 MG/1
2.5 CAPSULE ORAL DAILY
Qty: 21 CAPSULE | Refills: 0 | Status: SHIPPED | OUTPATIENT
Start: 2019-10-02 | End: 2019-10-30 | Stop reason: SDUPTHER

## 2019-10-29 DIAGNOSIS — C90.00 MULTIPLE MYELOMA, REMISSION STATUS UNSPECIFIED: ICD-10-CM

## 2019-10-29 RX ORDER — LENALIDOMIDE 2.5 MG/1
CAPSULE ORAL
Qty: 21 CAPSULE | Refills: 0 | Status: SHIPPED | OUTPATIENT
Start: 2019-10-29 | End: 2019-12-24

## 2019-10-30 NOTE — TELEPHONE ENCOUNTER
----- Message from Roselia Manzano sent at 10/30/2019 10:16 AM CDT -----  Contact: Ameena  Yolanda# [phone]  [phone]    Need authorization number for lenalidomide (REVLIMID) 2.5 mg Cap.

## 2019-10-31 DIAGNOSIS — C90.00 MULTIPLE MYELOMA, REMISSION STATUS UNSPECIFIED: ICD-10-CM

## 2019-10-31 RX ORDER — LENALIDOMIDE 2.5 MG/1
2.5 CAPSULE ORAL DAILY
Qty: 21 CAPSULE | Refills: 0 | Status: SHIPPED | OUTPATIENT
Start: 2019-10-31 | End: 2019-11-27 | Stop reason: SDUPTHER

## 2019-10-31 RX ORDER — LENALIDOMIDE 2.5 MG/1
2.5 CAPSULE ORAL DAILY
Qty: 21 CAPSULE | Refills: 0 | Status: SHIPPED | OUTPATIENT
Start: 2019-10-31 | End: 2019-10-31 | Stop reason: SDUPTHER

## 2019-10-31 NOTE — TELEPHONE ENCOUNTER
----- Message from Yolanda Brooks sent at 10/31/2019  2:01 PM CDT -----  Contact: [facility]/ Yolanda / tel: [phone] x [phone]   Caller says she called yesterday, wants to know the status of their msg. From yesterday.    Pt. Needs the REVLIMID  2.5 mgs. Needs the new authorization no. .   Every month will need to get a new authorization no.   Pls call.

## 2019-11-01 ENCOUNTER — LAB VISIT (OUTPATIENT)
Dept: LAB | Facility: HOSPITAL | Age: 79
End: 2019-11-01
Attending: INTERNAL MEDICINE
Payer: MEDICARE

## 2019-11-01 ENCOUNTER — TELEPHONE (OUTPATIENT)
Dept: HEMATOLOGY/ONCOLOGY | Facility: CLINIC | Age: 79
End: 2019-11-01

## 2019-11-01 DIAGNOSIS — C90.01 MULTIPLE MYELOMA IN REMISSION: ICD-10-CM

## 2019-11-01 LAB
ALBUMIN SERPL BCP-MCNC: 3.9 G/DL (ref 3.5–5.2)
ALP SERPL-CCNC: 51 U/L (ref 55–135)
ALT SERPL W/O P-5'-P-CCNC: 22 U/L (ref 10–44)
ANION GAP SERPL CALC-SCNC: 7 MMOL/L (ref 8–16)
AST SERPL-CCNC: 20 U/L (ref 10–40)
BASOPHILS # BLD AUTO: 0.01 K/UL (ref 0–0.2)
BASOPHILS NFR BLD: 0.4 % (ref 0–1.9)
BILIRUB SERPL-MCNC: 0.6 MG/DL (ref 0.1–1)
BUN SERPL-MCNC: 22 MG/DL (ref 8–23)
CALCIUM SERPL-MCNC: 9.4 MG/DL (ref 8.7–10.5)
CHLORIDE SERPL-SCNC: 106 MMOL/L (ref 95–110)
CO2 SERPL-SCNC: 25 MMOL/L (ref 23–29)
CREAT SERPL-MCNC: 1.2 MG/DL (ref 0.5–1.4)
DIFFERENTIAL METHOD: ABNORMAL
EOSINOPHIL # BLD AUTO: 0.1 K/UL (ref 0–0.5)
EOSINOPHIL NFR BLD: 4.6 % (ref 0–8)
ERYTHROCYTE [DISTWIDTH] IN BLOOD BY AUTOMATED COUNT: 13.6 % (ref 11.5–14.5)
EST. GFR  (AFRICAN AMERICAN): >60 ML/MIN/1.73 M^2
EST. GFR  (NON AFRICAN AMERICAN): 57 ML/MIN/1.73 M^2
GLUCOSE SERPL-MCNC: 96 MG/DL (ref 70–110)
HCT VFR BLD AUTO: 46 % (ref 40–54)
HGB BLD-MCNC: 14.6 G/DL (ref 14–18)
IGA SERPL-MCNC: 161 MG/DL (ref 40–350)
IGG SERPL-MCNC: 922 MG/DL (ref 650–1600)
IGM SERPL-MCNC: 49 MG/DL (ref 50–300)
IMM GRANULOCYTES # BLD AUTO: 0.01 K/UL (ref 0–0.04)
IMM GRANULOCYTES NFR BLD AUTO: 0.4 % (ref 0–0.5)
LYMPHOCYTES # BLD AUTO: 0.9 K/UL (ref 1–4.8)
LYMPHOCYTES NFR BLD: 30.3 % (ref 18–48)
MCH RBC QN AUTO: 29 PG (ref 27–31)
MCHC RBC AUTO-ENTMCNC: 31.7 G/DL (ref 32–36)
MCV RBC AUTO: 92 FL (ref 82–98)
MONOCYTES # BLD AUTO: 0.5 K/UL (ref 0.3–1)
MONOCYTES NFR BLD: 17.6 % (ref 4–15)
NEUTROPHILS # BLD AUTO: 1.3 K/UL (ref 1.8–7.7)
NEUTROPHILS NFR BLD: 46.7 % (ref 38–73)
NRBC BLD-RTO: 0 /100 WBC
PLATELET # BLD AUTO: 92 K/UL (ref 150–350)
PMV BLD AUTO: 11.1 FL (ref 9.2–12.9)
POTASSIUM SERPL-SCNC: 4.7 MMOL/L (ref 3.5–5.1)
PROT SERPL-MCNC: 6.6 G/DL (ref 6–8.4)
RBC # BLD AUTO: 5.03 M/UL (ref 4.6–6.2)
SODIUM SERPL-SCNC: 138 MMOL/L (ref 136–145)
WBC # BLD AUTO: 2.84 K/UL (ref 3.9–12.7)

## 2019-11-01 PROCEDURE — 36415 COLL VENOUS BLD VENIPUNCTURE: CPT

## 2019-11-01 PROCEDURE — 84165 PATHOLOGIST INTERPRETATION SPE: ICD-10-PCS | Mod: 26,,, | Performed by: PATHOLOGY

## 2019-11-01 PROCEDURE — 85025 COMPLETE CBC W/AUTO DIFF WBC: CPT

## 2019-11-01 PROCEDURE — 84165 PROTEIN E-PHORESIS SERUM: CPT | Mod: 26,,, | Performed by: PATHOLOGY

## 2019-11-01 PROCEDURE — 80053 COMPREHEN METABOLIC PANEL: CPT

## 2019-11-01 PROCEDURE — 86334 IMMUNOFIX E-PHORESIS SERUM: CPT

## 2019-11-01 PROCEDURE — 83520 IMMUNOASSAY QUANT NOS NONAB: CPT

## 2019-11-01 PROCEDURE — 86334 PATHOLOGIST INTERPRETATION IFE: ICD-10-PCS | Mod: 26,,, | Performed by: PATHOLOGY

## 2019-11-01 PROCEDURE — 84165 PROTEIN E-PHORESIS SERUM: CPT

## 2019-11-01 PROCEDURE — 86334 IMMUNOFIX E-PHORESIS SERUM: CPT | Mod: 26,,, | Performed by: PATHOLOGY

## 2019-11-01 PROCEDURE — 82784 ASSAY IGA/IGD/IGG/IGM EACH: CPT | Mod: 59

## 2019-11-01 NOTE — TELEPHONE ENCOUNTER
Spoke to pharmacy. auth number provided.    ----- Message from Shahab Antoine sent at 11/1/2019  1:36 PM CDT -----  Contact: Declan ([facility])  Pharmacy calling to clarify a prescription     Name of caller:: Declan    Pharmacy name and phone number:: [facility] [phone]    What medication do they need to clarify: lenalidomide (REVLIMID) 2.5 mg Cap    What do they need to clarify:: Pharmacy requesting a prior auth    Additional info::

## 2019-11-01 NOTE — TELEPHONE ENCOUNTER
Spoke to wife.  Informed her I spoke with pharmacy and provided requested information.  They should be contacting them for shipment    ----- Message from Zahira Lombardi sent at 11/1/2019  1:41 PM CDT -----  Pt called stating the pharmacy have not yet received a refill for lenalidomide (REVLIMID) 2.5 mg Cap. Pt asked can you call to get this straighten out.      PT contact # [phone].      Thanks

## 2019-11-04 LAB
ALBUMIN SERPL ELPH-MCNC: 3.91 G/DL (ref 3.35–5.55)
ALPHA1 GLOB SERPL ELPH-MCNC: 0.24 G/DL (ref 0.17–0.41)
ALPHA2 GLOB SERPL ELPH-MCNC: 0.62 G/DL (ref 0.43–0.99)
B-GLOBULIN SERPL ELPH-MCNC: 0.64 G/DL (ref 0.5–1.1)
GAMMA GLOB SERPL ELPH-MCNC: 0.89 G/DL (ref 0.67–1.58)
INTERPRETATION SERPL IFE-IMP: NORMAL
KAPPA LC SER QL IA: 3.12 MG/DL (ref 0.33–1.94)
KAPPA LC/LAMBDA SER IA: 0.55 (ref 0.26–1.65)
LAMBDA LC SER QL IA: 5.65 MG/DL (ref 0.57–2.63)
PATHOLOGIST INTERPRETATION IFE: NORMAL
PATHOLOGIST INTERPRETATION SPE: NORMAL
PROT SERPL-MCNC: 6.3 G/DL (ref 6–8.4)

## 2019-11-05 ENCOUNTER — TELEPHONE (OUTPATIENT)
Dept: HEMATOLOGY/ONCOLOGY | Facility: CLINIC | Age: 79
End: 2019-11-05

## 2019-11-27 DIAGNOSIS — C90.00 MULTIPLE MYELOMA, REMISSION STATUS UNSPECIFIED: ICD-10-CM

## 2019-11-27 RX ORDER — LENALIDOMIDE 2.5 MG/1
2.5 CAPSULE ORAL DAILY
Qty: 21 CAPSULE | Refills: 0 | Status: SHIPPED | OUTPATIENT
Start: 2019-11-27 | End: 2019-12-24

## 2019-12-19 NOTE — PROGRESS NOTES
SECTION OF HEMATOLOGY AND BONE MARROW TRANSPLANT  Return Patient Visit   12/20/2019  Referred by:  No ref. provider found  Referred for:  MM, autoSCT    CHIEF COMPLAINT:   Chief Complaint   Patient presents with    Follow-up       HISTORY OF PRESENT ILLNESS:   79 y.o. male  with pmh as below, ; diagnosis of systemic myeloma, cytogenetic studies normal.  Initially presentedfor cataract surgery.  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  Patient subsequently completed 23 planned fractions of XRT + dex with rad onc in [place].     Both have resulted in improvement in proptosis and vision.  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  PET scan showed known orbit lesion as well as multiple other sites of  Bone disease.    He has completed 9 cycles of VRd and given CR on restaging, age, patient preference, decision was made not to proceed with transplant.  He was transitioned to revlimid maintenance may 2017 and has been tolerating well with mild cytopenias and remained in biochemical remission since that time.        Due to cytopenias we transitioned to 5mg and then 2.5mg  21 day regimen of a 28 day cycle.       Today, pt presents for routine f/u of his myeloma. He feels well and denies pain. He is tolerating revlimid 2.5 mg on days 1-21 of 28 day cycle well without issues. He is compliant with ppx ASA. He remains active. Denies fevers, chills, sweats, sob, chest pain, diarrhea.     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 He has never used smokeless tobacco. He reports that he drinks about 7.0 standard drinks of alcohol per week. He reports that he does not use drugs.    FAMILY HISTORY:  Family History   Problem Relation Age of Onset    Cancer Mother     Prostate cancer Father     Coronary artery disease Father     Dementia Sister     Stroke Brother     Dementia Brother     No Known Problems Brother     Dementia Brother     No Known Problems Brother     Pancreatitis Brother     Cancer Brother     No Known Problems Sister     No Known Problems Sister        CURRENT MEDICATIONS:   Current Outpatient Medications   Medication Sig    alfuzosin (UROXATRAL) 10 mg Tb24     aspirin (ECOTRIN) 81 MG EC tablet Take 81 mg by mouth once daily.    carvedilol (COREG) 6.25 MG tablet     cephALEXin (KEFLEX) 500 MG capsule Take 500 mg by mouth every 12 (twelve) hours.    COMBIGAN 0.2-0.5 % Drop     cycloSPORINE (RESTASIS) 0.05 % ophthalmic emulsion Place 0.4 mLs (1 drop total) into both eyes 2 (two) times daily.    dexamethasone (DECADRON) 4 MG Tab     dorzolamide-timolol 2-0.5% (COSOPT) 22.3-6.8 mg/mL ophthalmic solution     DUREZOL 0.05 % Drop ophthalmic solution     hydrocortisone 1 % cream     ILEVRO 0.3 % DrpS INSTILL ONE DROP INTO LEFT EYE ONCE DAILY    ketoconazole (NIZORAL) 2 % shampoo     latanoprost 0.005 % ophthalmic solution     lenalidomide (REVLIMID) 2.5 mg Cap Take 1 capsule (2.5mg) by mouth once daily for 21 days of a 28 day cycle.    lenalidomide (REVLIMID) 2.5 mg Cap Take 2.5 mg by mouth Daily auth [id number] 11/27/19.    levothyroxine (SYNTHROID) 100 MCG tablet     levothyroxine (SYNTHROID) 125 MCG tablet     lifitegrast 5 % Dpet Place 1 drop into the right eye 2 (two) times daily.    multivitamin with minerals tablet Take 1 tablet by mouth once daily.    MYRBETRIQ 25 mg Tb24 ER  tablet     naproxen (NAPROSYN) 500 MG tablet     neomycin-polymyxin-hydrocortisone (CORTISPORIN) otic solution     nystatin (MYCOSTATIN) cream     omeprazole (PRILOSEC) 40 MG capsule     RESTASIS 0.05 % ophthalmic emulsion     rosuvastatin (CRESTOR) 20 MG tablet Take 20 mg by mouth.    sulfamethoxazole-trimethoprim 800-160mg (BACTRIM DS) 800-160 mg Tab     timolol maleate 0.5% (TIMOPTIC) 0.5 % Drop     tobramycin-dexamethasone 0.3-0.1% (TOBRADEX) 0.3-0.1 % DrpS Place 1 drop into the right eye 4 (four) times daily.    triamcinolone acetonide 0.025% (KENALOG) 0.025 % cream     triamcinolone acetonide 0.1% (KENALOG) 0.1 % cream      No current facility-administered medications for this visit.      Review of patient's allergies indicates:   Allergen Reactions    Levofloxacin Hives    Decongest tabs      All over the counter medications containing decongestive.     Quinolones Hives       REVIEW OF SYSTEMS:   General ROS: negative  Psychological ROS: negative  Ophthalmic ROS: see HPI  ENT ROS: negative  Allergy and Immunology ROS: negative  Hematological and Lymphatic ROS: negative  Endocrine ROS: negative  Respiratory ROS: negative  Cardiovascular ROS: negative  Gastrointestinal ROS: negative  Genito-Urinary ROS: negative  Musculoskeletal ROS: negative  Neurological ROS: negative  Dermatological ROS: negative    PHYSICAL EXAM:   Vitals:    12/20/19 1008   BP: (!) 148/67   Pulse: 62   Resp: 16   Temp: 98.3 °F (36.8 °C)       General - well developed, well nourished, no apparent distress  Head & Face - no sinus tenderness  Eyes - right eye appears red and irritated  ENT - oropharynx clear,  Normal dentition and gums  Neck - normal thyroid  Chest and Lung - normal respiratory effort, clear to auscultation bilaterally   Cardiovascular - RRR with no MGR, normal S1 and S2; no pedal edema  Abdomen -  soft, nontender, no palpable hepatomegaly or splenomegaly  Extremities - unremarkable nails and digits  Heme - no  bruising, petechiae, pallor  Skin - no rashes or lesions  Psych - appropriate mood and affect      ECOG Performance Status: (foot note - ECOG PS provided by Eastern Cooperative [employer]cology Group) 1 - Symptomatic but completely ambulatory    Karnofsky Performance Score:  90%- Able to Carry on Normal Activity: Minor Symptoms of Disease  DATA: .  Lab Results   Component Value Date    WBC 2.92 (L) 12/20/2019    HGB 14.9 12/20/2019    HCT 47.5 12/20/2019    MCV 94 12/20/2019     (L) 12/20/2019     Gran # (ANC)   Date Value Ref Range Status   12/20/2019 1.6 (L) 1.8 - 7.7 K/uL Final     Gran%   Date Value Ref Range Status   12/20/2019 55.1 38.0 - 73.0 % Final     Lymph #   Date Value Ref Range Status   12/20/2019 0.8 (L) 1.0 - 4.8 K/uL Final     Lymph%   Date Value Ref Range Status   12/20/2019 28.1 18.0 - 48.0 % Final     CMP  Sodium   Date Value Ref Range Status   12/20/2019 137 136 - 145 mmol/L Final     Potassium   Date Value Ref Range Status   12/20/2019 4.8 3.5 - 5.1 mmol/L Final     Chloride   Date Value Ref Range Status   12/20/2019 104 95 - 110 mmol/L Final     CO2   Date Value Ref Range Status   12/20/2019 28 23 - 29 mmol/L Final     Glucose   Date Value Ref Range Status   12/20/2019 104 70 - 110 mg/dL Final     BUN, Bld   Date Value Ref Range Status   12/20/2019 23 8 - 23 mg/dL Final     Creatinine   Date Value Ref Range Status   12/20/2019 1.3 0.5 - 1.4 mg/dL Final     Calcium   Date Value Ref Range Status   12/20/2019 9.8 8.7 - 10.5 mg/dL Final     Total Protein   Date Value Ref Range Status   12/20/2019 6.7 6.0 - 8.4 g/dL Final     Albumin   Date Value Ref Range Status   12/20/2019 3.9 3.5 - 5.2 g/dL Final     Total Bilirubin   Date Value Ref Range Status   12/20/2019 0.7 0.1 - 1.0 mg/dL Final     Comment:     For infants and newborns, interpretation of results should be based  on gestational age, weight and in agreement with clinical  observations.  Premature Infant recommended reference ranges:  Up to  24 hours.............<8.0 mg/dL  Up to 48 hours............<12.0 mg/dL  3-5 days..................<15.0 mg/dL  6-29 days.................<15.0 mg/dL       Alkaline Phosphatase   Date Value Ref Range Status   12/20/2019 55 55 - 135 U/L Final     AST   Date Value Ref Range Status   12/20/2019 20 10 - 40 U/L Final     ALT   Date Value Ref Range Status   12/20/2019 24 10 - 44 U/L Final     Anion Gap   Date Value Ref Range Status   12/20/2019 5 (L) 8 - 16 mmol/L Final     eGFR if    Date Value Ref Range Status   12/20/2019 60.0 >60 mL/min/1.73 m^2 Final     eGFR if non    Date Value Ref Range Status   12/20/2019 51.9 (A) >60 mL/min/1.73 m^2 Final     Comment:     Calculation used to obtain the estimated glomerular filtration  rate (eGFR) is the CKD-EPI equation.        IgG - Serum   Date Value Ref Range Status   12/20/2019 913 650 - 1600 mg/dL Final     Comment:     IgG Cord Blood Reference Range: 650-1600 mg/dL.     IgA   Date Value Ref Range Status   12/20/2019 176 40 - 350 mg/dL Final     Comment:     IgA Cord Blood Reference Range: <5 mg/dL.     IgM   Date Value Ref Range Status   12/20/2019 45 (L) 50 - 300 mg/dL Final     Comment:     IgM Cord Blood Reference Range: <25 mg/dL.     Kappa Free Light Chains   Date Value Ref Range Status   11/01/2019 3.12 (H) 0.33 - 1.94 mg/dL Final   09/16/2019 2.20 (H) 0.33 - 1.94 mg/dL Final   07/26/2019 2.61 (H) 0.33 - 1.94 mg/dL Final     Lambda Free Light Chains   Date Value Ref Range Status   11/01/2019 5.65 (H) 0.57 - 2.63 mg/dL Final   09/16/2019 3.28 (H) 0.57 - 2.63 mg/dL Final   07/26/2019 3.35 (H) 0.57 - 2.63 mg/dL Final     Kappa/Lambda FLC Ratio   Date Value Ref Range Status   11/01/2019 0.55 0.26 - 1.65 Final   09/16/2019 0.67 0.26 - 1.65 Final   07/26/2019 0.78 0.26 - 1.65 Final         PLEASE SEE EPIC MEDIA TAB FOR ALL IMAGING, PATHOLOGY REPORTS FROM OSH  ASSESSMENT AND PLAN:   Encounter Diagnoses   Name Primary?   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 No CRAB  -last imaging pet scan June 2018 with MYLENE; will plan on imaging based on symptoms or biochemical relapse moving forward  -will continue his dose/schedule reduced maintenance Revlimid at 2.5 mg days 1-21 of 28 day cycle (since July 2018 due to cytopenias)  -continue f/u with local opthalmology for eye irritation; suspect residual effect from radiation and not related to myeloma. No complaints of this today  -supportive meds: Continue ppx ASA; has completed 2 years bisphosph therapy   -educated on symptoms for which to seek attn in our clinic earlier     Calcium  -he is taking Ca supplement, Ca normal today so he may continue current dose he is taking    Follow Up:   -cbc, cmp, serum free light chains, quantitative immunoglobulins, serum electropheresis, serum immunofixation lab in [place] in 6 weeks  -same labs and appt with Dr. Park in 12 weeks    Elizabeth Lyon, YNES, NP  Hematology/Oncology

## 2019-12-20 ENCOUNTER — OFFICE VISIT (OUTPATIENT)
Dept: HEMATOLOGY/ONCOLOGY | Facility: CLINIC | Age: 79
End: 2019-12-20
Payer: MEDICARE

## 2019-12-20 ENCOUNTER — LAB VISIT (OUTPATIENT)
Dept: LAB | Facility: HOSPITAL | Age: 79
End: 2019-12-20
Attending: INTERNAL MEDICINE
Payer: MEDICARE

## 2019-12-20 VITALS
RESPIRATION RATE: 16 BRPM | DIASTOLIC BLOOD PRESSURE: 67 MMHG | SYSTOLIC BLOOD PRESSURE: 148 MMHG | TEMPERATURE: 98 F | WEIGHT: 168.19 LBS | OXYGEN SATURATION: 99 % | HEIGHT: 70 IN | BODY MASS INDEX: 24.08 KG/M2 | HEART RATE: 62 BPM

## 2019-12-20 DIAGNOSIS — C90.01 MULTIPLE MYELOMA IN REMISSION: ICD-10-CM

## 2019-12-20 DIAGNOSIS — T50.905A DRUG-INDUCED CYTOPENIA: ICD-10-CM

## 2019-12-20 DIAGNOSIS — C90.01 MULTIPLE MYELOMA IN REMISSION: Primary | ICD-10-CM

## 2019-12-20 DIAGNOSIS — D75.9 DRUG-INDUCED CYTOPENIA: ICD-10-CM

## 2019-12-20 LAB
ALBUMIN SERPL BCP-MCNC: 3.9 G/DL (ref 3.5–5.2)
ALP SERPL-CCNC: 55 U/L (ref 55–135)
ALT SERPL W/O P-5'-P-CCNC: 24 U/L (ref 10–44)
ANION GAP SERPL CALC-SCNC: 5 MMOL/L (ref 8–16)
AST SERPL-CCNC: 20 U/L (ref 10–40)
BASOPHILS # BLD AUTO: 0.02 K/UL (ref 0–0.2)
BASOPHILS NFR BLD: 0.7 % (ref 0–1.9)
BILIRUB SERPL-MCNC: 0.7 MG/DL (ref 0.1–1)
BUN SERPL-MCNC: 23 MG/DL (ref 8–23)
CALCIUM SERPL-MCNC: 9.8 MG/DL (ref 8.7–10.5)
CHLORIDE SERPL-SCNC: 104 MMOL/L (ref 95–110)
CO2 SERPL-SCNC: 28 MMOL/L (ref 23–29)
CREAT SERPL-MCNC: 1.3 MG/DL (ref 0.5–1.4)
DIFFERENTIAL METHOD: ABNORMAL
EOSINOPHIL # BLD AUTO: 0.1 K/UL (ref 0–0.5)
EOSINOPHIL NFR BLD: 3.4 % (ref 0–8)
ERYTHROCYTE [DISTWIDTH] IN BLOOD BY AUTOMATED COUNT: 13.7 % (ref 11.5–14.5)
EST. GFR  (AFRICAN AMERICAN): 60 ML/MIN/1.73 M^2
EST. GFR  (NON AFRICAN AMERICAN): 51.9 ML/MIN/1.73 M^2
GLUCOSE SERPL-MCNC: 104 MG/DL (ref 70–110)
HCT VFR BLD AUTO: 47.5 % (ref 40–54)
HGB BLD-MCNC: 14.9 G/DL (ref 14–18)
IGA SERPL-MCNC: 176 MG/DL (ref 40–350)
IGG SERPL-MCNC: 913 MG/DL (ref 650–1600)
IGM SERPL-MCNC: 45 MG/DL (ref 50–300)
IMM GRANULOCYTES # BLD AUTO: 0.02 K/UL (ref 0–0.04)
IMM GRANULOCYTES NFR BLD AUTO: 0.7 % (ref 0–0.5)
LYMPHOCYTES # BLD AUTO: 0.8 K/UL (ref 1–4.8)
LYMPHOCYTES NFR BLD: 28.1 % (ref 18–48)
MCH RBC QN AUTO: 29.4 PG (ref 27–31)
MCHC RBC AUTO-ENTMCNC: 31.4 G/DL (ref 32–36)
MCV RBC AUTO: 94 FL (ref 82–98)
MONOCYTES # BLD AUTO: 0.4 K/UL (ref 0.3–1)
MONOCYTES NFR BLD: 12 % (ref 4–15)
NEUTROPHILS # BLD AUTO: 1.6 K/UL (ref 1.8–7.7)
NEUTROPHILS NFR BLD: 55.1 % (ref 38–73)
NRBC BLD-RTO: 0 /100 WBC
PLATELET # BLD AUTO: 105 K/UL (ref 150–350)
PMV BLD AUTO: 11.6 FL (ref 9.2–12.9)
POTASSIUM SERPL-SCNC: 4.8 MMOL/L (ref 3.5–5.1)
PROT SERPL-MCNC: 6.7 G/DL (ref 6–8.4)
RBC # BLD AUTO: 5.07 M/UL (ref 4.6–6.2)
SODIUM SERPL-SCNC: 137 MMOL/L (ref 136–145)
WBC # BLD AUTO: 2.92 K/UL (ref 3.9–12.7)

## 2019-12-20 PROCEDURE — 99215 OFFICE O/P EST HI 40 MIN: CPT | Mod: PBBFAC | Performed by: NURSE PRACTITIONER

## 2019-12-20 PROCEDURE — 1159F MED LIST DOCD IN RCRD: CPT | Mod: ,,, | Performed by: NURSE PRACTITIONER

## 2019-12-20 PROCEDURE — 84165 PROTEIN E-PHORESIS SERUM: CPT

## 2019-12-20 PROCEDURE — 86334 IMMUNOFIX E-PHORESIS SERUM: CPT

## 2019-12-20 PROCEDURE — 99214 PR OFFICE/OUTPT VISIT, EST, LEVL IV, 30-39 MIN: ICD-10-PCS | Mod: S$PBB,,, | Performed by: NURSE PRACTITIONER

## 2019-12-20 PROCEDURE — 80053 COMPREHEN METABOLIC PANEL: CPT

## 2019-12-20 PROCEDURE — 83520 IMMUNOASSAY QUANT NOS NONAB: CPT | Mod: 59

## 2019-12-20 PROCEDURE — 82784 ASSAY IGA/IGD/IGG/IGM EACH: CPT | Mod: 59

## 2019-12-20 PROCEDURE — 99999 PR PBB SHADOW E&M-EST. PATIENT-LVL V: ICD-10-PCS | Mod: PBBFAC,,, | Performed by: NURSE PRACTITIONER

## 2019-12-20 PROCEDURE — 1126F AMNT PAIN NOTED NONE PRSNT: CPT | Mod: ,,, | Performed by: NURSE PRACTITIONER

## 2019-12-20 PROCEDURE — 36415 COLL VENOUS BLD VENIPUNCTURE: CPT

## 2019-12-20 PROCEDURE — 99999 PR PBB SHADOW E&M-EST. PATIENT-LVL V: CPT | Mod: PBBFAC,,, | Performed by: NURSE PRACTITIONER

## 2019-12-20 PROCEDURE — 86334 PATHOLOGIST INTERPRETATION IFE: ICD-10-PCS | Mod: 26,,, | Performed by: PATHOLOGY

## 2019-12-20 PROCEDURE — 84165 PROTEIN E-PHORESIS SERUM: CPT | Mod: 26,,, | Performed by: PATHOLOGY

## 2019-12-20 PROCEDURE — 85025 COMPLETE CBC W/AUTO DIFF WBC: CPT

## 2019-12-20 PROCEDURE — 84165 PATHOLOGIST INTERPRETATION SPE: ICD-10-PCS | Mod: 26,,, | Performed by: PATHOLOGY

## 2019-12-20 PROCEDURE — 1159F PR MEDICATION LIST DOCUMENTED IN MEDICAL RECORD: ICD-10-PCS | Mod: ,,, | Performed by: NURSE PRACTITIONER

## 2019-12-20 PROCEDURE — 99214 OFFICE O/P EST MOD 30 MIN: CPT | Mod: S$PBB,,, | Performed by: NURSE PRACTITIONER

## 2019-12-20 PROCEDURE — 86334 IMMUNOFIX E-PHORESIS SERUM: CPT | Mod: 26,,, | Performed by: PATHOLOGY

## 2019-12-20 PROCEDURE — 1126F PR PAIN SEVERITY QUANTIFIED, NO PAIN PRESENT: ICD-10-PCS | Mod: ,,, | Performed by: NURSE PRACTITIONER

## 2019-12-20 NOTE — Clinical Note
Please let pt know Ca today was normal and that he can continue taking the current dose of Calcium he is taking.

## 2019-12-20 NOTE — Clinical Note
Follow Up: -cbc, cmp, serum free light chains, quantitative immunoglobulins, serum electropheresis, serum immunofixation lab in [place] in 6 weeks-same labs and appt with Dr. Park in 12 weeks

## 2019-12-23 LAB
ALBUMIN SERPL ELPH-MCNC: 4.03 G/DL (ref 3.35–5.55)
ALPHA1 GLOB SERPL ELPH-MCNC: 0.24 G/DL (ref 0.17–0.41)
ALPHA2 GLOB SERPL ELPH-MCNC: 0.63 G/DL (ref 0.43–0.99)
B-GLOBULIN SERPL ELPH-MCNC: 0.67 G/DL (ref 0.5–1.1)
GAMMA GLOB SERPL ELPH-MCNC: 0.83 G/DL (ref 0.67–1.58)
INTERPRETATION SERPL IFE-IMP: NORMAL
KAPPA LC SER QL IA: 2.8 MG/DL (ref 0.33–1.94)
KAPPA LC/LAMBDA SER IA: 0.41 (ref 0.26–1.65)
LAMBDA LC SER QL IA: 6.79 MG/DL (ref 0.57–2.63)
PATHOLOGIST INTERPRETATION IFE: NORMAL
PATHOLOGIST INTERPRETATION SPE: NORMAL
PROT SERPL-MCNC: 6.4 G/DL (ref 6–8.4)

## 2019-12-24 DIAGNOSIS — C90.00 MULTIPLE MYELOMA, REMISSION STATUS UNSPECIFIED: ICD-10-CM

## 2019-12-24 RX ORDER — LENALIDOMIDE 2.5 MG/1
2.5 CAPSULE ORAL DAILY
Qty: 21 CAPSULE | Refills: 0 | Status: SHIPPED | OUTPATIENT
Start: 2019-12-24 | End: 2020-01-14

## 2020-01-21 DIAGNOSIS — C90.01 MULTIPLE MYELOMA IN REMISSION: Primary | ICD-10-CM

## 2020-01-22 RX ORDER — LENALIDOMIDE 2.5 MG/1
2.5 CAPSULE ORAL DAILY
Qty: 21 CAPSULE | Refills: 0 | Status: SHIPPED | OUTPATIENT
Start: 2020-01-22 | End: 2020-02-19 | Stop reason: SDUPTHER

## 2020-01-22 NOTE — TELEPHONE ENCOUNTER
damien spoke to pharmacy    ----- Message from Cassandra Villafuerte sent at 1/22/2020  1:07 PM CST -----  Contact: Kevin from [facility]  Kevin mansfield [facility] called to speak to the nurse regarding the pt's medication and would like a call back today at [phone] ext [phone]

## 2020-01-31 ENCOUNTER — LAB VISIT (OUTPATIENT)
Dept: LAB | Facility: HOSPITAL | Age: 80
End: 2020-01-31
Attending: INTERNAL MEDICINE
Payer: MEDICARE

## 2020-01-31 DIAGNOSIS — C90.01 MULTIPLE MYELOMA IN REMISSION: ICD-10-CM

## 2020-01-31 LAB
ALBUMIN SERPL BCP-MCNC: 3.7 G/DL (ref 3.5–5.2)
ALP SERPL-CCNC: 51 U/L (ref 55–135)
ALT SERPL W/O P-5'-P-CCNC: 21 U/L (ref 10–44)
ANION GAP SERPL CALC-SCNC: 5 MMOL/L (ref 8–16)
AST SERPL-CCNC: 18 U/L (ref 10–40)
BASOPHILS # BLD AUTO: 0.02 K/UL (ref 0–0.2)
BASOPHILS NFR BLD: 0.7 % (ref 0–1.9)
BILIRUB SERPL-MCNC: 0.5 MG/DL (ref 0.1–1)
BUN SERPL-MCNC: 20 MG/DL (ref 8–23)
CALCIUM SERPL-MCNC: 9 MG/DL (ref 8.7–10.5)
CHLORIDE SERPL-SCNC: 104 MMOL/L (ref 95–110)
CO2 SERPL-SCNC: 26 MMOL/L (ref 23–29)
CREAT SERPL-MCNC: 1.2 MG/DL (ref 0.5–1.4)
DIFFERENTIAL METHOD: ABNORMAL
EOSINOPHIL # BLD AUTO: 0.1 K/UL (ref 0–0.5)
EOSINOPHIL NFR BLD: 4.8 % (ref 0–8)
ERYTHROCYTE [DISTWIDTH] IN BLOOD BY AUTOMATED COUNT: 13.5 % (ref 11.5–14.5)
EST. GFR  (AFRICAN AMERICAN): >60 ML/MIN/1.73 M^2
EST. GFR  (NON AFRICAN AMERICAN): 57 ML/MIN/1.73 M^2
GLUCOSE SERPL-MCNC: 100 MG/DL (ref 70–110)
HCT VFR BLD AUTO: 41.9 % (ref 40–54)
HGB BLD-MCNC: 13.7 G/DL (ref 14–18)
IGA SERPL-MCNC: 158 MG/DL (ref 40–350)
IGG SERPL-MCNC: 806 MG/DL (ref 650–1600)
IGM SERPL-MCNC: 40 MG/DL (ref 50–300)
IMM GRANULOCYTES # BLD AUTO: 0 K/UL (ref 0–0.04)
IMM GRANULOCYTES NFR BLD AUTO: 0 % (ref 0–0.5)
LYMPHOCYTES # BLD AUTO: 1 K/UL (ref 1–4.8)
LYMPHOCYTES NFR BLD: 38.2 % (ref 18–48)
MCH RBC QN AUTO: 29.7 PG (ref 27–31)
MCHC RBC AUTO-ENTMCNC: 32.7 G/DL (ref 32–36)
MCV RBC AUTO: 91 FL (ref 82–98)
MONOCYTES # BLD AUTO: 0.6 K/UL (ref 0.3–1)
MONOCYTES NFR BLD: 20.2 % (ref 4–15)
NEUTROPHILS # BLD AUTO: 1 K/UL (ref 1.8–7.7)
NEUTROPHILS NFR BLD: 36.1 % (ref 38–73)
NRBC BLD-RTO: 0 /100 WBC
PLATELET # BLD AUTO: 97 K/UL (ref 150–350)
PMV BLD AUTO: 10.7 FL (ref 9.2–12.9)
POTASSIUM SERPL-SCNC: 4.6 MMOL/L (ref 3.5–5.1)
PROT SERPL-MCNC: 6 G/DL (ref 6–8.4)
RBC # BLD AUTO: 4.61 M/UL (ref 4.6–6.2)
SODIUM SERPL-SCNC: 135 MMOL/L (ref 136–145)
WBC # BLD AUTO: 2.72 K/UL (ref 3.9–12.7)

## 2020-01-31 PROCEDURE — 36415 COLL VENOUS BLD VENIPUNCTURE: CPT

## 2020-01-31 PROCEDURE — 84165 PATHOLOGIST INTERPRETATION SPE: ICD-10-PCS | Mod: 26,,, | Performed by: PATHOLOGY

## 2020-01-31 PROCEDURE — 86334 PATHOLOGIST INTERPRETATION IFE: ICD-10-PCS | Mod: 26,,, | Performed by: PATHOLOGY

## 2020-01-31 PROCEDURE — 86334 IMMUNOFIX E-PHORESIS SERUM: CPT | Mod: 26,,, | Performed by: PATHOLOGY

## 2020-01-31 PROCEDURE — 83520 IMMUNOASSAY QUANT NOS NONAB: CPT

## 2020-01-31 PROCEDURE — 80053 COMPREHEN METABOLIC PANEL: CPT

## 2020-01-31 PROCEDURE — 86334 IMMUNOFIX E-PHORESIS SERUM: CPT

## 2020-01-31 PROCEDURE — 85025 COMPLETE CBC W/AUTO DIFF WBC: CPT

## 2020-01-31 PROCEDURE — 84165 PROTEIN E-PHORESIS SERUM: CPT | Mod: 26,,, | Performed by: PATHOLOGY

## 2020-01-31 PROCEDURE — 84165 PROTEIN E-PHORESIS SERUM: CPT

## 2020-01-31 PROCEDURE — 82784 ASSAY IGA/IGD/IGG/IGM EACH: CPT | Mod: 59

## 2020-02-03 LAB
ALBUMIN SERPL ELPH-MCNC: 3.67 G/DL (ref 3.35–5.55)
ALPHA1 GLOB SERPL ELPH-MCNC: 0.21 G/DL (ref 0.17–0.41)
ALPHA2 GLOB SERPL ELPH-MCNC: 0.55 G/DL (ref 0.43–0.99)
B-GLOBULIN SERPL ELPH-MCNC: 0.6 G/DL (ref 0.5–1.1)
GAMMA GLOB SERPL ELPH-MCNC: 0.77 G/DL (ref 0.67–1.58)
INTERPRETATION SERPL IFE-IMP: NORMAL
KAPPA LC SER QL IA: 2.84 MG/DL (ref 0.33–1.94)
KAPPA LC/LAMBDA SER IA: 0.32 (ref 0.26–1.65)
LAMBDA LC SER QL IA: 8.88 MG/DL (ref 0.57–2.63)
PATHOLOGIST INTERPRETATION IFE: NORMAL
PATHOLOGIST INTERPRETATION SPE: NORMAL
PROT SERPL-MCNC: 5.8 G/DL (ref 6–8.4)

## 2020-02-19 DIAGNOSIS — C90.01 MULTIPLE MYELOMA IN REMISSION: ICD-10-CM

## 2020-02-20 RX ORDER — LENALIDOMIDE 2.5 MG/1
2.5 CAPSULE ORAL DAILY
Qty: 21 CAPSULE | Refills: 0 | Status: SHIPPED | OUTPATIENT
Start: 2020-02-20 | End: 2020-03-18

## 2020-03-18 DIAGNOSIS — C90.01 MULTIPLE MYELOMA IN REMISSION: ICD-10-CM

## 2020-03-18 RX ORDER — LENALIDOMIDE 2.5 MG/1
CAPSULE ORAL
Qty: 21 CAPSULE | Refills: 0 | Status: SHIPPED | OUTPATIENT
Start: 2020-03-18

## 2020-04-03 ENCOUNTER — TELEPHONE (OUTPATIENT)
Dept: HEMATOLOGY/ONCOLOGY | Facility: CLINIC | Age: 80
End: 2020-04-03

## 2020-04-06 ENCOUNTER — PATIENT MESSAGE (OUTPATIENT)
Dept: HEMATOLOGY/ONCOLOGY | Facility: CLINIC | Age: 80
End: 2020-04-06

## 2020-04-06 ENCOUNTER — TELEPHONE (OUTPATIENT)
Dept: HEMATOLOGY/ONCOLOGY | Facility: CLINIC | Age: 80
End: 2020-04-06

## 2020-04-06 NOTE — TELEPHONE ENCOUNTER
----- Message from Tori Jennings sent at 4/6/2020 11:57 AM CDT -----  Contact: PTs' Wife - Barbara  Barbara called to get appointment rescheduled please  Advised Barbara on how to register PT on my chart - will call back if any issues come about    Callback: [phone]

## 2020-04-06 NOTE — TELEPHONE ENCOUNTER
----- Message from Day Barbosa sent at 4/6/2020 10:47 AM CDT -----  Contact: PT's Wife   Was returning a call from Friday. Wanted to set up a phone call appointment for Friday 4/10. Please call back to set up     Callback: [phone]

## 2020-04-08 ENCOUNTER — DOCUMENTATION ONLY (OUTPATIENT)
Dept: HEMATOLOGY/ONCOLOGY | Facility: CLINIC | Age: 80
End: 2020-04-08

## 2020-04-08 NOTE — PROGRESS NOTES
Called and reached Mrs. Wynne (while  returning from local MD) for needs assessment during COVID-19 crisis.  The patient confirms their address as:  [address] [place], [place] [id number]       The patient provided the following emergency contacts:  1. daughter    The patient currently resides with wife.    The patient currently does notwork.    At home, the patient has no agency or equipment use.    The patient rates their distress related to COVID-19 on a scale from one to ten currently at 3.  The patient has been coping by strict quarantining, and receives support from children who deliver groceries.  Their current biggest fear is catching the virus while immunocomprimised.    The patient feels their basic needs are being met.  Their close friend owns a local grocery and is available should they need delivery.    The patient is aware of their next follow-up on 04/09 and is comfortable with planned telephone visit.  They have transportation available.    The patient was given Swer contact information and encouraged to contact with any additional needs or concerns.

## 2020-04-09 ENCOUNTER — OFFICE VISIT (OUTPATIENT)
Dept: HEMATOLOGY/ONCOLOGY | Facility: CLINIC | Age: 80
End: 2020-04-09
Payer: MEDICARE

## 2020-04-09 DIAGNOSIS — C90.02 MULTIPLE MYELOMA IN RELAPSE: Primary | ICD-10-CM

## 2020-04-09 PROCEDURE — 99215 PR OFFICE/OUTPT VISIT, EST, LEVL V, 40-54 MIN: ICD-10-PCS | Mod: 95,,, | Performed by: INTERNAL MEDICINE

## 2020-04-09 PROCEDURE — 99215 OFFICE O/P EST HI 40 MIN: CPT | Mod: 95,,, | Performed by: INTERNAL MEDICINE

## 2020-04-09 NOTE — PROGRESS NOTES
"The patient location is: home  The chief complaint leading to consultation is:  myeloma  Visit type: Virtual visit with synchronous audio and video  Total time spent with patient: 30 minutes  Each patient to whom he or she provides medical services by telemedicine is:  (1) informed of the relationship between the physician and patient and the respective role of any other health care provider with respect to management of the patient; and (2) notified that he or she may decline to receive medical services by telemedicine and may withdraw from such care at any time.    Notes:    80 y.o. 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 Patton in [place] (oncology) to avoid covid19 in [place]; start weekly Velcade/dex and xgeva in [place] early April 2020  -currently getting palliative radiation to t11 with Dr. George in [place]  -will get reports/labs sent from Dr. De La Vega s office per pt request   -pt with good PS so would recommend addition of either urban or pomalyst to pts velcade/dex to enhance response  -patient asking that Dr. Patton and I work together; will discuss case with " him  -continue fu with local opthalmology for eye irritation; suspect residual effect from radiation and not related to myeloma    -supportive meds:   Asa; completed 2 years bisphosph ; but restarted in light of active bone disease with bisphos; recommend VZV ppx while on PI   -told to call urologist regarding recent hemturia/urinary frequency.        Follow Up: -     to fu with Dr. Soria

## 2020-04-12 ENCOUNTER — PATIENT MESSAGE (OUTPATIENT)
Dept: HEMATOLOGY/ONCOLOGY | Facility: CLINIC | Age: 80
End: 2020-04-12

## 2020-04-15 DIAGNOSIS — C90.01 MULTIPLE MYELOMA IN REMISSION: ICD-10-CM

## 2020-04-16 RX ORDER — LENALIDOMIDE 2.5 MG/1
2.5 CAPSULE ORAL DAILY
Qty: 21 CAPSULE | Refills: 0 | OUTPATIENT
Start: 2020-04-16

## 2020-12-08 ENCOUNTER — LAB VISIT (OUTPATIENT)
Dept: LAB | Facility: HOSPITAL | Age: 80
End: 2020-12-08
Attending: INTERNAL MEDICINE
Payer: MEDICARE

## 2020-12-08 DIAGNOSIS — N17.9 ACUTE KIDNEY FAILURE, UNSPECIFIED: ICD-10-CM

## 2020-12-08 DIAGNOSIS — I12.9 PARENCHYMAL RENAL HYPERTENSION: ICD-10-CM

## 2020-12-08 DIAGNOSIS — N39.0 URINARY TRACT INFECTION, SITE NOT SPECIFIED: Primary | ICD-10-CM

## 2020-12-08 LAB
ALBUMIN SERPL BCP-MCNC: 3 G/DL (ref 3.5–5.2)
ANION GAP SERPL CALC-SCNC: 6 MMOL/L (ref 8–16)
BASOPHILS # BLD AUTO: 0.02 K/UL (ref 0–0.2)
BASOPHILS NFR BLD: 0.6 % (ref 0–1.9)
BUN SERPL-MCNC: 27 MG/DL (ref 8–23)
CALCIUM SERPL-MCNC: 9.2 MG/DL (ref 8.7–10.5)
CHLORIDE SERPL-SCNC: 107 MMOL/L (ref 95–110)
CO2 SERPL-SCNC: 27 MMOL/L (ref 23–29)
CREAT SERPL-MCNC: 1.5 MG/DL (ref 0.5–1.4)
DIFFERENTIAL METHOD: ABNORMAL
EOSINOPHIL # BLD AUTO: 0.1 K/UL (ref 0–0.5)
EOSINOPHIL NFR BLD: 2.4 % (ref 0–8)
ERYTHROCYTE [DISTWIDTH] IN BLOOD BY AUTOMATED COUNT: 13.1 % (ref 11.5–14.5)
EST. GFR  (AFRICAN AMERICAN): 50.1 ML/MIN/1.73 M^2
EST. GFR  (NON AFRICAN AMERICAN): 43.3 ML/MIN/1.73 M^2
GLUCOSE SERPL-MCNC: 98 MG/DL (ref 70–110)
HCT VFR BLD AUTO: 34 % (ref 40–54)
HGB BLD-MCNC: 10.6 G/DL (ref 14–18)
IMM GRANULOCYTES # BLD AUTO: 0.04 K/UL (ref 0–0.04)
IMM GRANULOCYTES NFR BLD AUTO: 1.2 % (ref 0–0.5)
LYMPHOCYTES # BLD AUTO: 0.6 K/UL (ref 1–4.8)
LYMPHOCYTES NFR BLD: 18.5 % (ref 18–48)
MAGNESIUM SERPL-MCNC: 2.3 MG/DL (ref 1.6–2.6)
MCH RBC QN AUTO: 29.9 PG (ref 27–31)
MCHC RBC AUTO-ENTMCNC: 31.2 G/DL (ref 32–36)
MCV RBC AUTO: 96 FL (ref 82–98)
MONOCYTES # BLD AUTO: 0.9 K/UL (ref 0.3–1)
MONOCYTES NFR BLD: 26.7 % (ref 4–15)
NEUTROPHILS # BLD AUTO: 1.7 K/UL (ref 1.8–7.7)
NEUTROPHILS NFR BLD: 50.6 % (ref 38–73)
NRBC BLD-RTO: 0 /100 WBC
PHOSPHATE SERPL-MCNC: 4.2 MG/DL (ref 2.7–4.5)
PLATELET # BLD AUTO: 175 K/UL (ref 150–350)
PMV BLD AUTO: 11.7 FL (ref 9.2–12.9)
POTASSIUM SERPL-SCNC: 4.4 MMOL/L (ref 3.5–5.1)
RBC # BLD AUTO: 3.54 M/UL (ref 4.6–6.2)
SODIUM SERPL-SCNC: 140 MMOL/L (ref 136–145)
WBC # BLD AUTO: 3.29 K/UL (ref 3.9–12.7)

## 2020-12-08 PROCEDURE — 80069 RENAL FUNCTION PANEL: CPT

## 2020-12-08 PROCEDURE — 83735 ASSAY OF MAGNESIUM: CPT

## 2020-12-08 PROCEDURE — 85025 COMPLETE CBC W/AUTO DIFF WBC: CPT

## 2020-12-08 PROCEDURE — 36415 COLL VENOUS BLD VENIPUNCTURE: CPT

## 2020-12-23 ENCOUNTER — APPOINTMENT (OUTPATIENT)
Dept: LAB | Facility: HOSPITAL | Age: 80
End: 2020-12-23
Attending: FAMILY MEDICINE
Payer: MEDICARE

## 2020-12-23 DIAGNOSIS — N39.0 URINARY TRACT INFECTION, SITE NOT SPECIFIED: Primary | ICD-10-CM

## 2020-12-23 LAB
BACTERIA #/AREA URNS HPF: NEGATIVE /HPF
BILIRUB UR QL STRIP: NEGATIVE
CLARITY UR: CLEAR
COLOR UR: YELLOW
GLUCOSE UR QL STRIP: NEGATIVE
HGB UR QL STRIP: NEGATIVE
HYALINE CASTS #/AREA URNS LPF: 2 /LPF
KETONES UR QL STRIP: NEGATIVE
LEUKOCYTE ESTERASE UR QL STRIP: ABNORMAL
MICROSCOPIC COMMENT: ABNORMAL
NITRITE UR QL STRIP: NEGATIVE
PH UR STRIP: 7 [PH] (ref 5–8)
PROT UR QL STRIP: NEGATIVE
RBC #/AREA URNS HPF: 1 /HPF (ref 0–4)
SP GR UR STRIP: 1.01 (ref 1–1.03)
SQUAMOUS #/AREA URNS HPF: 2 /HPF
URN SPEC COLLECT METH UR: ABNORMAL
UROBILINOGEN UR STRIP-ACNC: NEGATIVE EU/DL
WBC #/AREA URNS HPF: 30 /HPF (ref 0–5)

## 2020-12-23 PROCEDURE — 81000 URINALYSIS NONAUTO W/SCOPE: CPT

## 2020-12-23 PROCEDURE — 87086 URINE CULTURE/COLONY COUNT: CPT

## 2020-12-24 LAB — BACTERIA UR CULT: NO GROWTH

## 2021-01-26 ENCOUNTER — LAB VISIT (OUTPATIENT)
Dept: LAB | Facility: HOSPITAL | Age: 81
End: 2021-01-26
Payer: MEDICARE

## 2021-01-26 DIAGNOSIS — N17.9 ACUTE KIDNEY FAILURE, UNSPECIFIED: Primary | ICD-10-CM

## 2021-01-26 DIAGNOSIS — N18.31 CHRONIC KIDNEY DISEASE (CKD) STAGE G3A/A1, MODERATELY DECREASED GLOMERULAR FILTRATION RATE (GFR) BETWEEN 45-59 ML/MIN/1.73 SQUARE METER AND ALBUMINURIA CREATININE RATIO LESS THAN 30 MG/G: ICD-10-CM

## 2021-01-26 DIAGNOSIS — Z85.00 PERSONAL HISTORY OF MALIGNANT NEOPLASM OF DIGESTIVE ORGANS: ICD-10-CM

## 2021-01-26 DIAGNOSIS — E83.41 HYPERMAGNESEMIA: ICD-10-CM

## 2021-01-26 LAB
ALBUMIN SERPL BCP-MCNC: 3.2 G/DL (ref 3.5–5.2)
ANION GAP SERPL CALC-SCNC: 4 MMOL/L (ref 8–16)
BASOPHILS # BLD AUTO: 0.01 K/UL (ref 0–0.2)
BASOPHILS NFR BLD: 0.3 % (ref 0–1.9)
BUN SERPL-MCNC: 28 MG/DL (ref 8–23)
CALCIUM SERPL-MCNC: 9.1 MG/DL (ref 8.7–10.5)
CHLORIDE SERPL-SCNC: 107 MMOL/L (ref 95–110)
CO2 SERPL-SCNC: 29 MMOL/L (ref 23–29)
CREAT SERPL-MCNC: 1.2 MG/DL (ref 0.5–1.4)
DIFFERENTIAL METHOD: ABNORMAL
EOSINOPHIL # BLD AUTO: 0 K/UL (ref 0–0.5)
EOSINOPHIL NFR BLD: 0.9 % (ref 0–8)
ERYTHROCYTE [DISTWIDTH] IN BLOOD BY AUTOMATED COUNT: 15.7 % (ref 11.5–14.5)
EST. GFR  (AFRICAN AMERICAN): >60 ML/MIN/1.73 M^2
EST. GFR  (NON AFRICAN AMERICAN): 56.8 ML/MIN/1.73 M^2
GLUCOSE SERPL-MCNC: 90 MG/DL (ref 70–110)
HCT VFR BLD AUTO: 37.1 % (ref 40–54)
HGB BLD-MCNC: 11.7 G/DL (ref 14–18)
IMM GRANULOCYTES # BLD AUTO: 0.04 K/UL (ref 0–0.04)
IMM GRANULOCYTES NFR BLD AUTO: 1.2 % (ref 0–0.5)
LYMPHOCYTES # BLD AUTO: 0.7 K/UL (ref 1–4.8)
LYMPHOCYTES NFR BLD: 20.7 % (ref 18–48)
MAGNESIUM SERPL-MCNC: 2.1 MG/DL (ref 1.6–2.6)
MCH RBC QN AUTO: 29.3 PG (ref 27–31)
MCHC RBC AUTO-ENTMCNC: 31.5 G/DL (ref 32–36)
MCV RBC AUTO: 93 FL (ref 82–98)
MONOCYTES # BLD AUTO: 0.6 K/UL (ref 0.3–1)
MONOCYTES NFR BLD: 18.3 % (ref 4–15)
NEUTROPHILS # BLD AUTO: 2 K/UL (ref 1.8–7.7)
NEUTROPHILS NFR BLD: 58.6 % (ref 38–73)
NRBC BLD-RTO: 0 /100 WBC
PHOSPHATE SERPL-MCNC: 3.4 MG/DL (ref 2.7–4.5)
PLATELET # BLD AUTO: 108 K/UL (ref 150–350)
PMV BLD AUTO: 12.2 FL (ref 9.2–12.9)
POTASSIUM SERPL-SCNC: 3.9 MMOL/L (ref 3.5–5.1)
RBC # BLD AUTO: 3.99 M/UL (ref 4.6–6.2)
SODIUM SERPL-SCNC: 140 MMOL/L (ref 136–145)
WBC # BLD AUTO: 3.33 K/UL (ref 3.9–12.7)

## 2021-01-26 PROCEDURE — 80069 RENAL FUNCTION PANEL: CPT

## 2021-01-26 PROCEDURE — 36415 COLL VENOUS BLD VENIPUNCTURE: CPT

## 2021-01-26 PROCEDURE — 85025 COMPLETE CBC W/AUTO DIFF WBC: CPT

## 2021-01-26 PROCEDURE — 83735 ASSAY OF MAGNESIUM: CPT

## 2021-05-06 ENCOUNTER — PATIENT MESSAGE (OUTPATIENT)
Dept: RESEARCH | Facility: HOSPITAL | Age: 81
End: 2021-05-06

## 2021-12-02 ENCOUNTER — PATIENT MESSAGE (OUTPATIENT)
Dept: RESEARCH | Facility: HOSPITAL | Age: 81
End: 2021-12-02
Payer: MEDICARE

## 2022-01-01 NOTE — PROGRESS NOTES
-MYLENE  -eyes better; little myopia; seeing lane at end of month; talk to him about this   -labs in 1 month; labs md appt 2 months  -await biochemical stuides  -mild cytopenias from rev   -call pt with labs Monday   -find out how much zometa he got; schedule for next time after he sees me; q 3 months for another year    alert and awake

## 2022-03-22 NOTE — H&P
Blood glucose ranged 127-215 yesterday with patient receiving Lantus 4 units and Humalog 17 units. Blood glucose this morning was 104. Reviewed patient current regimen: Lantus 4 units HS, Humalog 3 units prandial, and Humalog correctional scale. Patient would likely benefit from a decrease or discontinuation of basal insulin to reduce the risk of morning hypoglycemia. Provider updated via Ezetap regarding recommendations and glycemic control. Pre-Operative History & Physical  Ophthalmology      SUBJECTIVE:     History of Present Illness:  Patient is a 77 y.o. male presents with Ectropion of right lower eyelid, unspecified ectropion type [H02.102]  Ectropion due to laxity of eyelid, right [H02.103].    MEDICATIONS:   PTA Medications   Medication Sig    acyclovir (ZOVIRAX) 400 MG tablet Take 1 tablet (400 mg total) by mouth 2 (two) times daily.    alfuzosin (UROXATRAL) 10 mg Tb24     aspirin (ECOTRIN) 81 MG EC tablet Take 81 mg by mouth once daily.    carvedilol (COREG) 6.25 MG tablet     cycloSPORINE (RESTASIS) 0.05 % ophthalmic emulsion 1 drop.    dexamethasone (DECADRON) 4 MG Tab     hydrocortisone 1 % cream     ketoconazole (NIZORAL) 2 % shampoo     latanoprost 0.005 % ophthalmic solution     lenalidomide (REVLIMID) 10 mg Cap Take 10 mg by mouth once daily.    levothyroxine (SYNTHROID) 125 MCG tablet     lifitegrast 5 % Dpet Place 1 drop into the right eye 2 (two) times daily.    multivitamin with minerals tablet Take 1 tablet by mouth once daily.    naproxen (NAPROSYN) 500 MG tablet     neomycin-polymyxin-hydrocortisone (CORTISPORIN) otic solution     RESTASIS 0.05 % ophthalmic emulsion     REVLIMID 25 mg Cap     rosuvastatin (CRESTOR) 20 MG tablet Take 20 mg by mouth.    levothyroxine (SYNTHROID) 125 MCG tablet     omeprazole (PRILOSEC) 40 MG capsule        ALLERGIES:   Review of patient's allergies indicates:   Allergen Reactions    Levofloxacin Hives    Decongest tabs      All over the counter medications containing decongestive.        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    broken leg Left     CARPAL TUNNEL RELEASE  Bilateral     EYE SURGERY Right     SHOULDER SURGERY Right      PAST FAMILY HISTORY:   Family History   Problem Relation Age of Onset    Cancer Mother     Prostate cancer Father     Coronary artery disease Father     Dementia Sister     Stroke Brother     Dementia Brother     No Known Problems Brother     Dementia Brother     No Known Problems Brother     Pancreatitis Brother     Cancer Brother     No Known Problems Sister     No Known Problems Sister      SOCIAL HISTORY:   Social History   Substance Use Topics    Smoking status: Former Smoker    Smokeless tobacco: Not on file      Comment: quit 32 years ago    Alcohol use 4.2 oz/week     7 Cans of beer per week      Comment: 1 beer nightly        MENTAL STATUS: Alert    REVIEW OF SYSTEMS: Negative    OBJECTIVE:     Vital Signs (Most Recent)  Temp: 98.2 °F (36.8 °C) (06/30/17 1309)  Pulse: 72 (06/30/17 1309)  Resp: 16 (06/30/17 1309)  BP: 120/67 (06/30/17 1309)  SpO2: 98 % (06/30/17 1309)    Physical Exam:  General: NAD  HEENT: Normocephalic  Lungs: Adequate respirations  Heart: +pulses, RRR  Abdomen: Soft    ASSESSMENT/PLAN:     Patient is a 77 y.o. male with Ectropion of right lower eyelid, unspecified ectropion type [H02.102]  Ectropion due to laxity of eyelid, right [H02.103].     - Plan for surgical correction ectropion repair   - Risks/benefits/alternatives of the procedure including, but not limited to scarring, bleeding, infection, loss or decreased vision, and/or need for possible repeat surgery discussed with the patient and family.   - Informed consent obtained prior to surgery and the patient/family voiced good understanding.

## 2022-05-26 NOTE — TELEPHONE ENCOUNTER
Called pt daughter and let her know that labs indicate disease still in remission. No change in treatment plans.    Skin Substitute Text: The defect edges were debeveled with a #15 scalpel blade.  Given the location of the defect, shape of the defect and the proximity to free margins a skin substitute graft was deemed most appropriate.  The graft material was trimmed to fit the size of the defect. The graft was then placed in the primary defect and oriented appropriately.

## 2023-01-01 NOTE — TELEPHONE ENCOUNTER
Spoke with Rep at [facility] and they informed me pt can not get authorization from Dr Park until previous treating provider has discharged current order tried reaching out to pt daughter and there was no answer left a voicemail. [facility] will reach out to previous provider to get the discharge so pt can be treated by Dr Park.   CPAP/Tracheal suctioning/Pulse Oximetry

## (undated) DEVICE — BANDAGE ADHESIVE

## (undated) DEVICE — COVER LIGHT HANDLE 80/CA

## (undated) DEVICE — DROPPER MEDICINE

## (undated) DEVICE — TRAY MUSCLE LID EYE

## (undated) DEVICE — SEE MEDLINE ITEM 152487

## (undated) DEVICE — SOL 9P NACL IRR PIC IL

## (undated) DEVICE — SOL BETADINE 5%

## (undated) DEVICE — SEE MEDLINE ITEM 157128

## (undated) DEVICE — NDL SPINAL 20GX3.5 HUB

## (undated) DEVICE — DRAPE STERI INSTRUMENT 1018

## (undated) DEVICE — ELECTRODE REM PLYHSV RETURN 9

## (undated) DEVICE — PENCIL ROCKER SWITCH 10FT CORD

## (undated) DEVICE — SOL WATER STRL IRR 1000ML

## (undated) DEVICE — SEE MEDLINE ITEM 152622

## (undated) DEVICE — NDL HYPO REG 25G X 1 1/2

## (undated) DEVICE — DENTAL ROLL 1 1/2 X 3/8

## (undated) DEVICE — SYR SLIP TIP 10ML SHIELD

## (undated) DEVICE — CUP MEDICINE STERILE 2OZ

## (undated) DEVICE — FORCEP CURVED DISP

## (undated) DEVICE — SHEET EENT SPLIT

## (undated) DEVICE — NDL STRAIGHT 4CM LEIBINGER

## (undated) DEVICE — BLADE SURG #15 CARBON STEEL

## (undated) DEVICE — CORD BIPOLAR 12 FOOT

## (undated) DEVICE — NDL HYPO A BEVEL 22X1 1/2

## (undated) DEVICE — GOWN SURGICAL X-LARGE

## (undated) DEVICE — CLEANER TIP ELECSURG 2X2IN

## (undated) DEVICE — SKINMARKER & RULER REGULAR X-F

## (undated) DEVICE — SYR 10CC LUER LOCK